# Patient Record
Sex: FEMALE | Race: WHITE
[De-identification: names, ages, dates, MRNs, and addresses within clinical notes are randomized per-mention and may not be internally consistent; named-entity substitution may affect disease eponyms.]

---

## 2018-05-07 ENCOUNTER — APPOINTMENT (OUTPATIENT)
Dept: CARDIOLOGY | Facility: CLINIC | Age: 83
End: 2018-05-07
Payer: MEDICARE

## 2018-05-07 PROCEDURE — 93306 TTE W/DOPPLER COMPLETE: CPT

## 2018-05-07 PROCEDURE — 78452 HT MUSCLE IMAGE SPECT MULT: CPT

## 2018-05-07 PROCEDURE — A9500: CPT

## 2018-05-07 PROCEDURE — 93015 CV STRESS TEST SUPVJ I&R: CPT

## 2019-05-14 NOTE — ASU PATIENT PROFILE, ADULT - PSH
History of hernia repair H/O total hip arthroplasty, bilateral    H/O total hysterectomy    History of bilateral knee arthroplasty    History of hernia repair

## 2019-05-15 ENCOUNTER — RESULT REVIEW (OUTPATIENT)
Age: 84
End: 2019-05-15

## 2019-05-15 ENCOUNTER — OUTPATIENT (OUTPATIENT)
Dept: OUTPATIENT SERVICES | Facility: HOSPITAL | Age: 84
LOS: 1 days | Discharge: HOME | End: 2019-05-15
Payer: MEDICARE

## 2019-05-15 VITALS
DIASTOLIC BLOOD PRESSURE: 68 MMHG | HEIGHT: 59 IN | WEIGHT: 171.3 LBS | SYSTOLIC BLOOD PRESSURE: 126 MMHG | OXYGEN SATURATION: 97 % | TEMPERATURE: 98 F | RESPIRATION RATE: 16 BRPM | HEART RATE: 90 BPM

## 2019-05-15 VITALS
HEART RATE: 76 BPM | OXYGEN SATURATION: 100 % | RESPIRATION RATE: 16 BRPM | SYSTOLIC BLOOD PRESSURE: 120 MMHG | DIASTOLIC BLOOD PRESSURE: 82 MMHG

## 2019-05-15 DIAGNOSIS — Z96.653 PRESENCE OF ARTIFICIAL KNEE JOINT, BILATERAL: Chronic | ICD-10-CM

## 2019-05-15 DIAGNOSIS — Z90.710 ACQUIRED ABSENCE OF BOTH CERVIX AND UTERUS: Chronic | ICD-10-CM

## 2019-05-15 DIAGNOSIS — Z96.643 PRESENCE OF ARTIFICIAL HIP JOINT, BILATERAL: Chronic | ICD-10-CM

## 2019-05-15 DIAGNOSIS — Z98.890 OTHER SPECIFIED POSTPROCEDURAL STATES: Chronic | ICD-10-CM

## 2019-05-15 LAB
ANION GAP SERPL CALC-SCNC: 7 MMOL/L — SIGNIFICANT CHANGE UP (ref 5–17)
BUN SERPL-MCNC: 30 MG/DL — HIGH (ref 7–23)
CALCIUM SERPL-MCNC: 8.7 MG/DL — SIGNIFICANT CHANGE UP (ref 8.5–10.1)
CHLORIDE SERPL-SCNC: 116 MMOL/L — HIGH (ref 96–108)
CO2 SERPL-SCNC: 20 MMOL/L — LOW (ref 22–31)
CREAT SERPL-MCNC: 1.61 MG/DL — HIGH (ref 0.5–1.3)
GLUCOSE SERPL-MCNC: 134 MG/DL — HIGH (ref 70–99)
INR BLD: 1.37 RATIO — HIGH (ref 0.88–1.16)
POTASSIUM SERPL-MCNC: 4.7 MMOL/L — SIGNIFICANT CHANGE UP (ref 3.5–5.3)
POTASSIUM SERPL-SCNC: 4.7 MMOL/L — SIGNIFICANT CHANGE UP (ref 3.5–5.3)
PROTHROM AB SERPL-ACNC: 15.4 SEC — HIGH (ref 10–12.9)
SODIUM SERPL-SCNC: 143 MMOL/L — SIGNIFICANT CHANGE UP (ref 135–145)

## 2019-05-15 PROCEDURE — 76942 ECHO GUIDE FOR BIOPSY: CPT | Mod: 26

## 2019-05-15 PROCEDURE — 88342 IMHCHEM/IMCYTCHM 1ST ANTB: CPT | Mod: 26

## 2019-05-15 PROCEDURE — 88341 IMHCHEM/IMCYTCHM EA ADD ANTB: CPT | Mod: 26

## 2019-05-15 PROCEDURE — 88173 CYTOPATH EVAL FNA REPORT: CPT | Mod: 26

## 2019-05-15 PROCEDURE — 93010 ELECTROCARDIOGRAM REPORT: CPT

## 2019-05-15 PROCEDURE — 88305 TISSUE EXAM BY PATHOLOGIST: CPT | Mod: 26

## 2019-05-15 PROCEDURE — 49180 BIOPSY ABDOMINAL MASS: CPT

## 2019-05-15 RX ORDER — SODIUM CHLORIDE 9 MG/ML
1000 INJECTION INTRAMUSCULAR; INTRAVENOUS; SUBCUTANEOUS
Refills: 0 | Status: DISCONTINUED | OUTPATIENT
Start: 2019-05-15 | End: 2019-05-15

## 2019-05-15 RX ORDER — OXYCODONE HYDROCHLORIDE 5 MG/1
5 TABLET ORAL ONCE
Refills: 0 | Status: DISCONTINUED | OUTPATIENT
Start: 2019-05-15 | End: 2019-05-15

## 2019-05-15 RX ORDER — ONDANSETRON 8 MG/1
4 TABLET, FILM COATED ORAL ONCE
Refills: 0 | Status: DISCONTINUED | OUTPATIENT
Start: 2019-05-15 | End: 2019-05-15

## 2019-05-15 RX ORDER — FENTANYL CITRATE 50 UG/ML
50 INJECTION INTRAVENOUS
Refills: 0 | Status: DISCONTINUED | OUTPATIENT
Start: 2019-05-15 | End: 2019-05-15

## 2019-05-15 NOTE — BRIEF OPERATIVE NOTE - OPERATION/FINDINGS
1) Peritoneal nodule seen on US  2) Access under US with 18G biopsy device  3) 18G core x 5  4) No post procedure hematoma

## 2019-05-22 LAB — SURGICAL PATHOLOGY STUDY: SIGNIFICANT CHANGE UP

## 2019-05-23 DIAGNOSIS — Z79.82 LONG TERM (CURRENT) USE OF ASPIRIN: ICD-10-CM

## 2019-05-23 DIAGNOSIS — Z88.0 ALLERGY STATUS TO PENICILLIN: ICD-10-CM

## 2019-05-23 DIAGNOSIS — C90.01 MULTIPLE MYELOMA IN REMISSION: ICD-10-CM

## 2019-05-23 DIAGNOSIS — K66.8 OTHER SPECIFIED DISORDERS OF PERITONEUM: ICD-10-CM

## 2019-05-23 DIAGNOSIS — I10 ESSENTIAL (PRIMARY) HYPERTENSION: ICD-10-CM

## 2019-05-23 DIAGNOSIS — G47.33 OBSTRUCTIVE SLEEP APNEA (ADULT) (PEDIATRIC): ICD-10-CM

## 2019-05-23 DIAGNOSIS — C48.1 MALIGNANT NEOPLASM OF SPECIFIED PARTS OF PERITONEUM: ICD-10-CM

## 2019-06-13 PROBLEM — C90.00 MULTIPLE MYELOMA NOT HAVING ACHIEVED REMISSION: Chronic | Status: ACTIVE | Noted: 2019-05-14

## 2019-06-13 PROBLEM — D64.9 ANEMIA, UNSPECIFIED: Chronic | Status: ACTIVE | Noted: 2019-05-14

## 2019-06-13 PROBLEM — N20.0 CALCULUS OF KIDNEY: Chronic | Status: ACTIVE | Noted: 2019-05-14

## 2019-06-14 ENCOUNTER — OUTPATIENT (OUTPATIENT)
Dept: OUTPATIENT SERVICES | Facility: HOSPITAL | Age: 84
LOS: 1 days | Discharge: HOME | End: 2019-06-14
Payer: MEDICARE

## 2019-06-14 DIAGNOSIS — Z98.890 OTHER SPECIFIED POSTPROCEDURAL STATES: Chronic | ICD-10-CM

## 2019-06-14 DIAGNOSIS — Z98.49 CATARACT EXTRACTION STATUS, UNSPECIFIED EYE: Chronic | ICD-10-CM

## 2019-06-14 DIAGNOSIS — R89.7 ABNORMAL HISTOLOGICAL FINDINGS IN SPECIMENS FROM OTHER ORGANS, SYSTEMS AND TISSUES: Chronic | ICD-10-CM

## 2019-06-14 DIAGNOSIS — Z90.89 ACQUIRED ABSENCE OF OTHER ORGANS: Chronic | ICD-10-CM

## 2019-06-14 DIAGNOSIS — Z76.89 PERSONS ENCOUNTERING HEALTH SERVICES IN OTHER SPECIFIED CIRCUMSTANCES: Chronic | ICD-10-CM

## 2019-06-14 DIAGNOSIS — C80.1 MALIGNANT (PRIMARY) NEOPLASM, UNSPECIFIED: ICD-10-CM

## 2019-06-14 DIAGNOSIS — Z96.643 PRESENCE OF ARTIFICIAL HIP JOINT, BILATERAL: Chronic | ICD-10-CM

## 2019-06-14 DIAGNOSIS — Z96.653 PRESENCE OF ARTIFICIAL KNEE JOINT, BILATERAL: Chronic | ICD-10-CM

## 2019-06-14 DIAGNOSIS — Z90.710 ACQUIRED ABSENCE OF BOTH CERVIX AND UTERUS: Chronic | ICD-10-CM

## 2019-06-14 LAB
ANION GAP SERPL CALC-SCNC: 10 MMOL/L — SIGNIFICANT CHANGE UP (ref 5–17)
APPEARANCE UR: CLEAR — SIGNIFICANT CHANGE UP
BASOPHILS # BLD AUTO: 0.02 K/UL — SIGNIFICANT CHANGE UP (ref 0–0.2)
BASOPHILS NFR BLD AUTO: 0.1 % — SIGNIFICANT CHANGE UP (ref 0–2)
BILIRUB UR-MCNC: NEGATIVE — SIGNIFICANT CHANGE UP
BUN SERPL-MCNC: 33 MG/DL — HIGH (ref 7–23)
CALCIUM SERPL-MCNC: 9.1 MG/DL — SIGNIFICANT CHANGE UP (ref 8.5–10.1)
CHLORIDE SERPL-SCNC: 117 MMOL/L — HIGH (ref 96–108)
CO2 SERPL-SCNC: 19 MMOL/L — LOW (ref 22–31)
COLOR SPEC: YELLOW — SIGNIFICANT CHANGE UP
CREAT SERPL-MCNC: 1.72 MG/DL — HIGH (ref 0.5–1.3)
DIFF PNL FLD: ABNORMAL
EOSINOPHIL # BLD AUTO: 0.06 K/UL — SIGNIFICANT CHANGE UP (ref 0–0.5)
EOSINOPHIL NFR BLD AUTO: 0.4 % — SIGNIFICANT CHANGE UP (ref 0–6)
GLUCOSE SERPL-MCNC: 116 MG/DL — HIGH (ref 70–99)
GLUCOSE UR QL: NEGATIVE MG/DL — SIGNIFICANT CHANGE UP
HCT VFR BLD CALC: 31.6 % — LOW (ref 34.5–45)
HGB BLD-MCNC: 9.6 G/DL — LOW (ref 11.5–15.5)
IMM GRANULOCYTES NFR BLD AUTO: 0.6 % — SIGNIFICANT CHANGE UP (ref 0–1.5)
KETONES UR-MCNC: ABNORMAL
LEUKOCYTE ESTERASE UR-ACNC: ABNORMAL
LYMPHOCYTES # BLD AUTO: 0.8 K/UL — LOW (ref 1–3.3)
LYMPHOCYTES # BLD AUTO: 5.1 % — LOW (ref 13–44)
MCHC RBC-ENTMCNC: 27.4 PG — SIGNIFICANT CHANGE UP (ref 27–34)
MCHC RBC-ENTMCNC: 30.4 GM/DL — LOW (ref 32–36)
MCV RBC AUTO: 90 FL — SIGNIFICANT CHANGE UP (ref 80–100)
MONOCYTES # BLD AUTO: 1.22 K/UL — HIGH (ref 0–0.9)
MONOCYTES NFR BLD AUTO: 7.8 % — SIGNIFICANT CHANGE UP (ref 2–14)
NEUTROPHILS # BLD AUTO: 13.45 K/UL — HIGH (ref 1.8–7.4)
NEUTROPHILS NFR BLD AUTO: 86 % — HIGH (ref 43–77)
NITRITE UR-MCNC: POSITIVE
PH UR: 6 — SIGNIFICANT CHANGE UP (ref 5–8)
PLATELET # BLD AUTO: 155 K/UL — SIGNIFICANT CHANGE UP (ref 150–400)
POTASSIUM SERPL-MCNC: 4.5 MMOL/L — SIGNIFICANT CHANGE UP (ref 3.5–5.3)
POTASSIUM SERPL-SCNC: 4.5 MMOL/L — SIGNIFICANT CHANGE UP (ref 3.5–5.3)
PROT UR-MCNC: 100 MG/DL
RBC # BLD: 3.51 M/UL — LOW (ref 3.8–5.2)
RBC # FLD: 16.9 % — HIGH (ref 10.3–14.5)
SODIUM SERPL-SCNC: 146 MMOL/L — HIGH (ref 135–145)
SP GR SPEC: 1.01 — SIGNIFICANT CHANGE UP (ref 1.01–1.02)
UROBILINOGEN FLD QL: NEGATIVE MG/DL — SIGNIFICANT CHANGE UP
WBC # BLD: 15.64 K/UL — HIGH (ref 3.8–10.5)
WBC # FLD AUTO: 15.64 K/UL — HIGH (ref 3.8–10.5)

## 2019-06-14 PROCEDURE — 93010 ELECTROCARDIOGRAM REPORT: CPT

## 2019-06-14 RX ORDER — LOPERAMIDE HCL 2 MG
0 TABLET ORAL
Qty: 0 | Refills: 0 | DISCHARGE

## 2019-06-14 RX ORDER — SODIUM CHLORIDE 9 MG/ML
1000 INJECTION, SOLUTION INTRAVENOUS
Refills: 0 | Status: DISCONTINUED | OUTPATIENT
Start: 2019-06-17 | End: 2019-06-17

## 2019-06-14 RX ORDER — ASPIRIN/CALCIUM CARB/MAGNESIUM 324 MG
1 TABLET ORAL
Qty: 0 | Refills: 0 | DISCHARGE

## 2019-06-14 RX ORDER — OXYCODONE HYDROCHLORIDE 5 MG/1
5 TABLET ORAL ONCE
Refills: 0 | Status: DISCONTINUED | OUTPATIENT
Start: 2019-06-17 | End: 2019-06-17

## 2019-06-14 RX ORDER — ACETAMINOPHEN 500 MG
2 TABLET ORAL
Qty: 0 | Refills: 0 | DISCHARGE

## 2019-06-14 RX ORDER — FENTANYL CITRATE 50 UG/ML
25 INJECTION INTRAVENOUS
Refills: 0 | Status: DISCONTINUED | OUTPATIENT
Start: 2019-06-17 | End: 2019-06-17

## 2019-06-14 RX ORDER — ONDANSETRON 8 MG/1
4 TABLET, FILM COATED ORAL ONCE
Refills: 0 | Status: DISCONTINUED | OUTPATIENT
Start: 2019-06-17 | End: 2019-06-17

## 2019-06-14 NOTE — ASU PATIENT PROFILE, ADULT - PSH
Abnormal biopsy result  abdomen May 2019  Encounter for cholecystectomy    H/O total hip arthroplasty, bilateral    H/O total hysterectomy    History of bilateral knee arthroplasty    History of hernia repair    S/P cataract surgery  b/l  S/P colonoscopy    S/P tonsillectomy

## 2019-06-14 NOTE — ASU PATIENT PROFILE, ADULT - PMH
Abdominal pain    Anemia    Anxiety    Diarrhea    GERD (gastroesophageal reflux disease)    Hyperlipidemia    Hypertension    Myeloma    Nephrolithiasis    Obesity    Osteoporosis    Urinary incontinence

## 2019-06-15 LAB
MRSA PCR RESULT.: SIGNIFICANT CHANGE UP
S AUREUS DNA NOSE QL NAA+PROBE: SIGNIFICANT CHANGE UP

## 2019-06-17 ENCOUNTER — OUTPATIENT (OUTPATIENT)
Dept: OUTPATIENT SERVICES | Facility: HOSPITAL | Age: 84
LOS: 1 days | Discharge: HOME | End: 2019-06-17
Payer: MEDICARE

## 2019-06-17 VITALS
WEIGHT: 156.09 LBS | TEMPERATURE: 98 F | DIASTOLIC BLOOD PRESSURE: 54 MMHG | OXYGEN SATURATION: 98 % | HEART RATE: 84 BPM | RESPIRATION RATE: 16 BRPM | SYSTOLIC BLOOD PRESSURE: 100 MMHG | HEIGHT: 57.5 IN

## 2019-06-17 VITALS
OXYGEN SATURATION: 97 % | DIASTOLIC BLOOD PRESSURE: 60 MMHG | RESPIRATION RATE: 14 BRPM | HEART RATE: 77 BPM | TEMPERATURE: 98 F | SYSTOLIC BLOOD PRESSURE: 124 MMHG

## 2019-06-17 DIAGNOSIS — Z98.890 OTHER SPECIFIED POSTPROCEDURAL STATES: Chronic | ICD-10-CM

## 2019-06-17 DIAGNOSIS — Z96.643 PRESENCE OF ARTIFICIAL HIP JOINT, BILATERAL: Chronic | ICD-10-CM

## 2019-06-17 DIAGNOSIS — Z90.710 ACQUIRED ABSENCE OF BOTH CERVIX AND UTERUS: Chronic | ICD-10-CM

## 2019-06-17 DIAGNOSIS — Z90.89 ACQUIRED ABSENCE OF OTHER ORGANS: Chronic | ICD-10-CM

## 2019-06-17 DIAGNOSIS — Z96.653 PRESENCE OF ARTIFICIAL KNEE JOINT, BILATERAL: Chronic | ICD-10-CM

## 2019-06-17 DIAGNOSIS — Z98.49 CATARACT EXTRACTION STATUS, UNSPECIFIED EYE: Chronic | ICD-10-CM

## 2019-06-17 DIAGNOSIS — R89.7 ABNORMAL HISTOLOGICAL FINDINGS IN SPECIMENS FROM OTHER ORGANS, SYSTEMS AND TISSUES: Chronic | ICD-10-CM

## 2019-06-17 DIAGNOSIS — Z76.89 PERSONS ENCOUNTERING HEALTH SERVICES IN OTHER SPECIFIED CIRCUMSTANCES: Chronic | ICD-10-CM

## 2019-06-17 PROCEDURE — 71045 X-RAY EXAM CHEST 1 VIEW: CPT | Mod: 26

## 2019-06-17 RX ORDER — ACETAMINOPHEN 500 MG
650 TABLET ORAL EVERY 6 HOURS
Refills: 0 | Status: DISCONTINUED | OUTPATIENT
Start: 2019-06-17 | End: 2019-07-02

## 2019-06-17 RX ORDER — SODIUM CHLORIDE 9 MG/ML
1000 INJECTION, SOLUTION INTRAVENOUS
Refills: 0 | Status: DISCONTINUED | OUTPATIENT
Start: 2019-06-17 | End: 2019-07-02

## 2019-06-17 RX ORDER — OXYCODONE HYDROCHLORIDE 5 MG/1
5 TABLET ORAL EVERY 6 HOURS
Refills: 0 | Status: DISCONTINUED | OUTPATIENT
Start: 2019-06-17 | End: 2019-06-17

## 2019-06-17 NOTE — ASU PATIENT PROFILE, ADULT - PMH
Abdominal pain    Anemia    Anxiety    Chronic kidney disease    Diarrhea    GERD (gastroesophageal reflux disease)    Hyperlipidemia    Hypertension    Myeloma    Nephrolithiasis    Obesity    Osteoporosis    Urinary incontinence

## 2019-06-17 NOTE — BRIEF OPERATIVE NOTE - NSICDXBRIEFPROCEDURE_GEN_ALL_CORE_FT
PROCEDURES:  Insertion, port, with imaging guidance, age 5 years or older 17-Jun-2019 09:09:03  Nestor Guillen

## 2019-06-21 DIAGNOSIS — N28.9 DISORDER OF KIDNEY AND URETER, UNSPECIFIED: ICD-10-CM

## 2019-06-21 DIAGNOSIS — F32.9 MAJOR DEPRESSIVE DISORDER, SINGLE EPISODE, UNSPECIFIED: ICD-10-CM

## 2019-06-21 DIAGNOSIS — Z88.0 ALLERGY STATUS TO PENICILLIN: ICD-10-CM

## 2019-06-21 DIAGNOSIS — C48.1 MALIGNANT NEOPLASM OF SPECIFIED PARTS OF PERITONEUM: ICD-10-CM

## 2019-06-21 DIAGNOSIS — C90.00 MULTIPLE MYELOMA NOT HAVING ACHIEVED REMISSION: ICD-10-CM

## 2019-06-21 DIAGNOSIS — I48.0 PAROXYSMAL ATRIAL FIBRILLATION: ICD-10-CM

## 2019-06-21 DIAGNOSIS — Z88.1 ALLERGY STATUS TO OTHER ANTIBIOTIC AGENTS STATUS: ICD-10-CM

## 2019-06-21 DIAGNOSIS — I10 ESSENTIAL (PRIMARY) HYPERTENSION: ICD-10-CM

## 2019-06-21 DIAGNOSIS — Z79.891 LONG TERM (CURRENT) USE OF OPIATE ANALGESIC: ICD-10-CM

## 2019-07-01 ENCOUNTER — EMERGENCY (EMERGENCY)
Facility: HOSPITAL | Age: 84
LOS: 0 days | Discharge: HOME | End: 2019-07-01
Attending: EMERGENCY MEDICINE
Payer: COMMERCIAL

## 2019-07-01 VITALS
OXYGEN SATURATION: 99 % | HEART RATE: 62 BPM | RESPIRATION RATE: 18 BRPM | DIASTOLIC BLOOD PRESSURE: 41 MMHG | SYSTOLIC BLOOD PRESSURE: 111 MMHG

## 2019-07-01 VITALS
OXYGEN SATURATION: 98 % | DIASTOLIC BLOOD PRESSURE: 53 MMHG | HEIGHT: 65 IN | WEIGHT: 115.08 LBS | HEART RATE: 67 BPM | TEMPERATURE: 98 F | SYSTOLIC BLOOD PRESSURE: 111 MMHG | RESPIRATION RATE: 17 BRPM

## 2019-07-01 DIAGNOSIS — Z87.442 PERSONAL HISTORY OF URINARY CALCULI: ICD-10-CM

## 2019-07-01 DIAGNOSIS — Z76.89 PERSONS ENCOUNTERING HEALTH SERVICES IN OTHER SPECIFIED CIRCUMSTANCES: Chronic | ICD-10-CM
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
## 2019-07-01 DIAGNOSIS — Z88.0 ALLERGY STATUS TO PENICILLIN: ICD-10-CM

## 2019-07-01 DIAGNOSIS — I25.10 ATHEROSCLEROTIC HEART DISEASE OF NATIVE CORONARY ARTERY WITHOUT ANGINA PECTORIS: ICD-10-CM

## 2019-07-01 DIAGNOSIS — S00.03XA CONTUSION OF SCALP, INITIAL ENCOUNTER: ICD-10-CM

## 2019-07-01 DIAGNOSIS — S09.90XA UNSPECIFIED INJURY OF HEAD, INITIAL ENCOUNTER: ICD-10-CM
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
## 2019-07-01 DIAGNOSIS — K21.9 GASTRO-ESOPHAGEAL REFLUX DISEASE WITHOUT ESOPHAGITIS: ICD-10-CM

## 2019-07-01 DIAGNOSIS — Z96.643 PRESENCE OF ARTIFICIAL HIP JOINT, BILATERAL: Chronic | ICD-10-CM

## 2019-07-01 DIAGNOSIS — I12.9 HYPERTENSIVE CHRONIC KIDNEY DISEASE WITH STAGE 1 THROUGH STAGE 4 CHRONIC KIDNEY DISEASE, OR UNSPECIFIED CHRONIC KIDNEY DISEASE: ICD-10-CM
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
## 2019-07-01 DIAGNOSIS — N18.9 CHRONIC KIDNEY DISEASE, UNSPECIFIED: ICD-10-CM
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
## 2019-07-01 DIAGNOSIS — M81.0 AGE-RELATED OSTEOPOROSIS WITHOUT CURRENT PATHOLOGICAL FRACTURE: ICD-10-CM

## 2019-07-01 DIAGNOSIS — R89.7 ABNORMAL HISTOLOGICAL FINDINGS IN SPECIMENS FROM OTHER ORGANS, SYSTEMS AND TISSUES: Chronic | ICD-10-CM

## 2019-07-01 DIAGNOSIS — Z98.890 OTHER SPECIFIED POSTPROCEDURAL STATES: Chronic | ICD-10-CM

## 2019-07-01 DIAGNOSIS — Z98.49 CATARACT EXTRACTION STATUS, UNSPECIFIED EYE: Chronic | ICD-10-CM

## 2019-07-01 DIAGNOSIS — I49.9 CARDIAC ARRHYTHMIA, UNSPECIFIED: ICD-10-CM

## 2019-07-01 DIAGNOSIS — Z90.89 ACQUIRED ABSENCE OF OTHER ORGANS: Chronic | ICD-10-CM

## 2019-07-01 DIAGNOSIS — F41.9 ANXIETY DISORDER, UNSPECIFIED: ICD-10-CM

## 2019-07-01 DIAGNOSIS — Z79.82 LONG TERM (CURRENT) USE OF ASPIRIN: ICD-10-CM

## 2019-07-01 DIAGNOSIS — Z96.653 PRESENCE OF ARTIFICIAL KNEE JOINT, BILATERAL: Chronic | ICD-10-CM

## 2019-07-01 DIAGNOSIS — Z90.710 ACQUIRED ABSENCE OF BOTH CERVIX AND UTERUS: Chronic | ICD-10-CM

## 2019-07-01 DIAGNOSIS — E78.5 HYPERLIPIDEMIA, UNSPECIFIED: ICD-10-CM

## 2019-07-01 DIAGNOSIS — D64.9 ANEMIA, UNSPECIFIED: ICD-10-CM

## 2019-07-01 PROBLEM — R10.9 UNSPECIFIED ABDOMINAL PAIN: Chronic | Status: ACTIVE | Noted: 2019-06-14

## 2019-07-01 PROBLEM — R19.7 DIARRHEA, UNSPECIFIED: Chronic | Status: ACTIVE | Noted: 2019-06-14

## 2019-07-01 PROBLEM — I10 ESSENTIAL (PRIMARY) HYPERTENSION: Chronic | Status: ACTIVE | Noted: 2019-06-14

## 2019-07-01 PROBLEM — E66.9 OBESITY, UNSPECIFIED: Chronic | Status: ACTIVE | Noted: 2019-06-14

## 2019-07-01 PROBLEM — R32 UNSPECIFIED URINARY INCONTINENCE: Chronic | Status: ACTIVE | Noted: 2019-06-14

## 2019-07-01 PROCEDURE — 72170 X-RAY EXAM OF PELVIS: CPT | Mod: 26

## 2019-07-01 PROCEDURE — 72125 CT NECK SPINE W/O DYE: CPT | Mod: 26

## 2019-07-01 PROCEDURE — 70450 CT HEAD/BRAIN W/O DYE: CPT | Mod: 26

## 2019-07-01 PROCEDURE — 99284 EMERGENCY DEPT VISIT MOD MDM: CPT

## 2019-07-01 NOTE — ED ADULT NURSE NOTE - NSIMPLEMENTINTERV_GEN_ALL_ED
Implemented All Fall Risk Interventions:  [place] to call system. Call bell, personal items and telephone within reach. Instruct patient to call for assistance. Room bathroom lighting operational. Non-slip footwear when patient is off stretcher. Physically safe environment: no spills, clutter or unnecessary equipment. Stretcher in lowest position, wheels locked, appropriate side rails in place. Provide visual cue, wrist band, yellow gown, etc. Monitor gait and stability. Monitor for mental status changes and reorient to person, place, and time. Review medications for side effects contributing to fall risk. Reinforce activity limits and safety measures with patient and family.

## 2019-07-01 NOTE — ED PROVIDER NOTE - MUSCULOSKELETAL, MLM
neck non tender supple without pain. back: pelvis non tender stable GANDHI x4 no focal swelling tenderness. n/l SLR 40 degrees no motor decrease pain. pt states chronic due to prior shoulder injury no focal tenderness.

## 2019-07-01 NOTE — ED PROVIDER NOTE - CHPI ED SYMPTOMS NEG
no back pain, no HA, no neck pain, no nausea, no blurry vision, no lethargy, no obvious changes in speech/no loss of consciousness/no bleeding

## 2019-07-01 NOTE — ED PROVIDER NOTE - NSFOLLOWUPINSTRUCTIONS_ED_ALL_ED_FT
Continue your regular Continue your regular medications as per routine.  Tylenol 325 mg 2 tabs every 6 hours as needed for headache, aches & pains.  Follow up this week with your doctor at your facility.  Avoid walking backwards: you are at increased of falling when attempting to walk backwards.  Otherwise, use walker for ambulation assistance.    ED evaluation and management discussed with the patient and family (if available) in detail.  Close PMD follow up encouraged.  Strict ED return instructions discussed in detail and patient given the opportunity to ask any questions about their discharge diagnosis and instructions. Patient verbalized understanding.        Closed Head Injury    A closed head injury is an injury to your head that may or may not involve a traumatic brain injury (TBI). Symptoms of TBI can be short or long lasting and include headache, dizziness, interference with memory or speech, fatigue, confusion, changes in sleep, mood changes, nausea, depression/anxiety, and dulling of senses. Make sure to obtain proper rest which includes getting plenty of sleep, avoiding excessive visual stimulation, and avoiding activities that may cause physical or mental stress. Avoid any situation where there is potential for another head injury, including sports.    SEEK IMMEDIATE MEDICAL CARE IF YOU HAVE ANY OF THE FOLLOWING SYMPTOMS: unusual drowsiness, vomiting, severe dizziness, seizures, lightheadedness, muscular weakness, different pupil sizes, visual changes, or clear or bloody discharge from your ears or nose.        Fall Prevention in the Home, Adult  Falls can cause injuries. They can happen to people of all ages. There are many things you can do to make your home safe and to help prevent falls. Ask for help when making these changes, if needed.    What actions can I take to prevent falls?  General Instructions     Use good lighting in all rooms. Replace any light bulbs that burn out.  Turn on the lights when you go into a dark area. Use night-lights.  Keep items that you use often in easy-to-reach places. Lower the shelves around your home if necessary.  Set up your furniture so you have a clear path. Avoid moving your furniture around.  Do not have throw rugs and other things on the floor that can make you trip.  Avoid walking on wet floors.  If any of your floors are uneven, fix them.  Add color or contrast paint or tape to clearly jada and help you see:  Any grab bars or handrails.  First and last steps of stairways.  Where the edge of each step is.  If you use a stepladder:  Make sure that it is fully opened. Do not climb a closed stepladder.  Make sure that both sides of the stepladder are locked into place.  Ask someone to hold the stepladder for you while you use it.  If there are any pets around you, be aware of where they are.  What can I do in the bathroom?     Image Image   Keep the floor dry. Clean up any water that spills onto the floor as soon as it happens.  Remove soap buildup in the tub or shower regularly.  Use non-skid mats or decals on the floor of the tub or shower.  Attach bath mats securely with double-sided, non-slip rug tape.  If you need to sit down in the shower, use a plastic, non-slip stool.  Install grab bars by the toilet and in the tub and shower. Do not use towel bars as grab bars.  What can I do in the bedroom?     Make sure that you have a light by your bed that is easy to reach.  Do not use any sheets or blankets that are too big for your bed. They should not hang down onto the floor.  Have a firm chair that has side arms. You can use this for support while you get dressed.  What can I do in the kitchen?     Clean up any spills right away.  If you need to reach something above you, use a strong step stool that has a grab bar.  Keep electrical cords out of the way.  Do not use floor polish or wax that makes floors slippery. If you must use wax, use non-skid floor wax.  What can I do with my stairs?     Do not leave any items on the stairs.  Make sure that you have a light switch at the top of the stairs and the bottom of the stairs. If you do not have them, ask someone to add them for you.  Make sure that there are handrails on both sides of the stairs, and use them. Fix handrails that are broken or loose. Make sure that handrails are as long as the stairways.  Install non-slip stair treads on all stairs in your home.  Avoid having throw rugs at the top or bottom of the stairs. If you do have throw rugs, attach them to the floor with carpet tape.  Choose a carpet that does not hide the edge of the steps on the stairway.  Check any carpeting to make sure that it is firmly attached to the stairs. Fix any carpet that is loose or worn.  What can I do on the outside of my home?     Use bright outdoor lighting.  Regularly fix the edges of walkways and driveways and fix any cracks.  Remove anything that might make you trip as you walk through a door, such as a raised step or threshold.  Trim any bushes or trees on the path to your home.  Regularly check to see if handrails are loose or broken. Make sure that both sides of any steps have handrails.  Install guardrails along the edges of any raised decks and porches.  Clear walking paths of anything that might make someone trip, such as tools or rocks.  Have any leaves, snow, or ice cleared regularly.  Use sand or salt on walking paths during winter.  Clean up any spills in your garage right away. This includes grease or oil spills.  What other actions can I take?     Wear shoes that:  Have a low heel. Do not wear high heels.  Have rubber bottoms.  Are comfortable and fit you well.  Are closed at the toe. Do not wear open-toe sandals.  Use tools that help you move around (mobility aids) if they are needed. These include:  Canes.  Walkers.  Scooters.  Crutches.  Review your medicines with your doctor. Some medicines can make you feel dizzy. This can increase your chance of falling.  Ask your doctor what other things you can do to help prevent falls.    Where to find more information  Centers for Disease Control and PreventionALLEGRA: https://cdc.gov  National [place] on Aging: https://oi3yfwk.ervin.nih.gov  Contact a doctor if:  You are afraid of falling at home.  You feel weak, drowsy, or dizzy at home.  You fall at home.  Summary  There are many simple things that you can do to make your home safe and to help prevent falls.  Ways to make your home safe include removing tripping hazards and installing grab bars in the bathroom.  Ask for help when making these changes in your home.  This information is not intended to replace advice given to you by your health care provider. Make sure you discuss any questions you have with your health care provider.

## 2019-07-01 NOTE — ED ADULT TRIAGE NOTE - CHIEF COMPLAINT QUOTE
Patient presents with EMS reports unwitnessed fall at assisted living. Patient was found on the ground, unsure what time fall occurred. Patient has hematoma to back of head. Patient on 81mg ASA daily trauma alert called at triage

## 2019-07-01 NOTE — ED ADULT NURSE REASSESSMENT NOTE - NS ED NURSE REASSESS COMMENT FT1
Pt able to ambulate with assistance and Dr Colunga made aware and pt given sandwich. will continue to monitor

## 2019-07-01 NOTE — ED PROVIDER NOTE - OBJECTIVE STATEMENT
84 y/o female with a PMHx of Anxiety, Arrythmia, Anemia, CAD, CKD, GERD, Obesity, HLD, HTN, Peripheral edema BIBA from [facility] from [place] s/p unwitnessed mechanical trip and fall with head injury around 0630 this morning. +bruise back of the head. States she was going to the bathroom, was ambulating backwards to open the door. states she lost her balance and fell back and landed on head. Needed help to get up from fall. No obvious bleeding. No LOC. Denies back pain, head pain, neck pain, nausea, blurry vision, lethargy, or obvious changes in speech. No known urine/BS changes. On daily full ASA. Baseline: ambulatory with a walker. Allergic to Penicillin. PCP: Dr. Chris Harding

## 2019-07-01 NOTE — ED PROVIDER NOTE - PROGRESS NOTE DETAILS
Ariana Colunga: during initial ED exam pt able to sit up without discomfort. Dr. Colunga:  CTs negative, XR negative by my review.  Pt w/o active c/o.   Informed by ED RN that pt passed ambulation trial.  Pt stable for D/C back to facility.

## 2019-07-01 NOTE — ED PROVIDER NOTE - ENMT, MLM
Minimal occipital scalp hematoma, overlying skin intact. Airway patent, Mouth with normal mucosa. Patel's negative. no facial injury.

## 2019-07-01 NOTE — ED PROVIDER NOTE - CLINICAL SUMMARY MEDICAL DECISION MAKING FREE TEXT BOX
82 y/o female with a PMHX of CAD on ASA, BIBA s/p lost balance while walking backwards and opening a door, fell back hitting occipital. No LOC, HA, no active complaints. neurovasc intact. Plan CT head, X ray pelvis study of CT negative.

## 2019-07-01 NOTE — ED PROVIDER NOTE - NEUROLOGICAL, MLM
Alert and oriented, no focal deficits, no motor or sensory deficits. speech normal, cranial nerves intact

## 2019-07-01 NOTE — ED PROVIDER NOTE - CARE PLAN
Principal Discharge DX:	Injury of head, initial encounter  Secondary Diagnosis:	Scalp hematoma, initial encounter  Secondary Diagnosis:	Fall from standing, initial encounter

## 2019-07-06 ENCOUNTER — OUTPATIENT (OUTPATIENT)
Dept: OUTPATIENT SERVICES | Facility: HOSPITAL | Age: 84
LOS: 1 days | Discharge: HOME | End: 2019-07-06

## 2019-07-06 DIAGNOSIS — Z76.89 PERSONS ENCOUNTERING HEALTH SERVICES IN OTHER SPECIFIED CIRCUMSTANCES: Chronic | ICD-10-CM

## 2019-07-06 DIAGNOSIS — R89.7 ABNORMAL HISTOLOGICAL FINDINGS IN SPECIMENS FROM OTHER ORGANS, SYSTEMS AND TISSUES: Chronic | ICD-10-CM

## 2019-07-06 DIAGNOSIS — Z98.890 OTHER SPECIFIED POSTPROCEDURAL STATES: Chronic | ICD-10-CM

## 2019-07-06 DIAGNOSIS — Z90.710 ACQUIRED ABSENCE OF BOTH CERVIX AND UTERUS: Chronic | ICD-10-CM

## 2019-07-06 DIAGNOSIS — Z96.653 PRESENCE OF ARTIFICIAL KNEE JOINT, BILATERAL: Chronic | ICD-10-CM

## 2019-07-06 DIAGNOSIS — Z98.49 CATARACT EXTRACTION STATUS, UNSPECIFIED EYE: Chronic | ICD-10-CM

## 2019-07-06 DIAGNOSIS — Z96.643 PRESENCE OF ARTIFICIAL HIP JOINT, BILATERAL: Chronic | ICD-10-CM

## 2019-07-06 DIAGNOSIS — Z90.89 ACQUIRED ABSENCE OF OTHER ORGANS: Chronic | ICD-10-CM

## 2019-07-06 LAB
ABO RH CONFIRMATION: SIGNIFICANT CHANGE UP
BLD GP AB SCN SERPL QL: SIGNIFICANT CHANGE UP
TYPE + AB SCN PNL BLD: SIGNIFICANT CHANGE UP

## 2019-07-08 ENCOUNTER — OUTPATIENT (OUTPATIENT)
Dept: OUTPATIENT SERVICES | Facility: HOSPITAL | Age: 84
LOS: 1 days | Discharge: HOME | End: 2019-07-08

## 2019-07-08 VITALS
HEART RATE: 88 BPM | OXYGEN SATURATION: 98 % | RESPIRATION RATE: 16 BRPM | SYSTOLIC BLOOD PRESSURE: 85 MMHG | DIASTOLIC BLOOD PRESSURE: 40 MMHG | TEMPERATURE: 98 F

## 2019-07-08 VITALS
RESPIRATION RATE: 16 BRPM | SYSTOLIC BLOOD PRESSURE: 113 MMHG | TEMPERATURE: 98 F | DIASTOLIC BLOOD PRESSURE: 48 MMHG | HEART RATE: 59 BPM

## 2019-07-08 DIAGNOSIS — Z96.653 PRESENCE OF ARTIFICIAL KNEE JOINT, BILATERAL: Chronic | ICD-10-CM

## 2019-07-08 DIAGNOSIS — Z76.89 PERSONS ENCOUNTERING HEALTH SERVICES IN OTHER SPECIFIED CIRCUMSTANCES: Chronic | ICD-10-CM

## 2019-07-08 DIAGNOSIS — Z98.49 CATARACT EXTRACTION STATUS, UNSPECIFIED EYE: Chronic | ICD-10-CM

## 2019-07-08 DIAGNOSIS — Z90.89 ACQUIRED ABSENCE OF OTHER ORGANS: Chronic | ICD-10-CM

## 2019-07-08 DIAGNOSIS — Z98.890 OTHER SPECIFIED POSTPROCEDURAL STATES: Chronic | ICD-10-CM

## 2019-07-08 DIAGNOSIS — Z96.643 PRESENCE OF ARTIFICIAL HIP JOINT, BILATERAL: Chronic | ICD-10-CM

## 2019-07-08 DIAGNOSIS — R89.7 ABNORMAL HISTOLOGICAL FINDINGS IN SPECIMENS FROM OTHER ORGANS, SYSTEMS AND TISSUES: Chronic | ICD-10-CM

## 2019-07-08 DIAGNOSIS — Z90.710 ACQUIRED ABSENCE OF BOTH CERVIX AND UTERUS: Chronic | ICD-10-CM

## 2019-07-08 RX ORDER — DIPHENHYDRAMINE HCL 50 MG
25 CAPSULE ORAL ONCE
Refills: 0 | Status: COMPLETED | OUTPATIENT
Start: 2019-07-08 | End: 2019-07-08

## 2019-07-08 RX ORDER — DIPHENHYDRAMINE HCL 50 MG
25 CAPSULE ORAL ONCE
Refills: 0 | Status: DISCONTINUED | OUTPATIENT
Start: 2019-07-08 | End: 2019-08-12

## 2019-07-08 RX ORDER — ACETAMINOPHEN 500 MG
650 TABLET ORAL ONCE
Refills: 0 | Status: DISCONTINUED | OUTPATIENT
Start: 2019-07-08 | End: 2019-08-12

## 2019-07-08 RX ORDER — ACETAMINOPHEN 500 MG
650 TABLET ORAL ONCE
Refills: 0 | Status: COMPLETED | OUTPATIENT
Start: 2019-07-08 | End: 2019-07-08

## 2019-07-08 RX ADMIN — Medication 650 MILLIGRAM(S): at 09:23

## 2019-07-08 RX ADMIN — Medication 25 MILLIGRAM(S): at 09:23

## 2019-07-08 NOTE — INFUSION NURSING HISTORY - RN HISTORY HPI
bx --> dx march 2019  chemo once a week x 3 sessions so far bx --> dx march 2019  chemo once a week (carboplatin + taxol) x 3 sessions so far

## 2019-07-11 DIAGNOSIS — C56.9 MALIGNANT NEOPLASM OF UNSPECIFIED OVARY: ICD-10-CM

## 2019-07-25 DIAGNOSIS — C56.9 MALIGNANT NEOPLASM OF UNSPECIFIED OVARY: ICD-10-CM

## 2019-08-18 ENCOUNTER — EMERGENCY (EMERGENCY)
Facility: HOSPITAL | Age: 84
LOS: 0 days | Discharge: HOME | End: 2019-08-18
Attending: EMERGENCY MEDICINE
Payer: MEDICARE

## 2019-08-18 VITALS
RESPIRATION RATE: 21 BRPM | TEMPERATURE: 98 F | SYSTOLIC BLOOD PRESSURE: 91 MMHG | OXYGEN SATURATION: 98 % | WEIGHT: 130.07 LBS | HEART RATE: 100 BPM | DIASTOLIC BLOOD PRESSURE: 62 MMHG

## 2019-08-18 VITALS
SYSTOLIC BLOOD PRESSURE: 145 MMHG | TEMPERATURE: 98 F | DIASTOLIC BLOOD PRESSURE: 70 MMHG | HEART RATE: 63 BPM | RESPIRATION RATE: 20 BRPM | OXYGEN SATURATION: 98 %

## 2019-08-18 DIAGNOSIS — Z98.890 OTHER SPECIFIED POSTPROCEDURAL STATES: Chronic | ICD-10-CM

## 2019-08-18 DIAGNOSIS — E78.5 HYPERLIPIDEMIA, UNSPECIFIED: ICD-10-CM

## 2019-08-18 DIAGNOSIS — E66.9 OBESITY, UNSPECIFIED: ICD-10-CM

## 2019-08-18 DIAGNOSIS — Z90.89 ACQUIRED ABSENCE OF OTHER ORGANS: Chronic | ICD-10-CM

## 2019-08-18 DIAGNOSIS — K59.00 CONSTIPATION, UNSPECIFIED: ICD-10-CM

## 2019-08-18 DIAGNOSIS — Z76.89 PERSONS ENCOUNTERING HEALTH SERVICES IN OTHER SPECIFIED CIRCUMSTANCES: Chronic | ICD-10-CM

## 2019-08-18 DIAGNOSIS — Z98.49 CATARACT EXTRACTION STATUS, UNSPECIFIED EYE: Chronic | ICD-10-CM

## 2019-08-18 DIAGNOSIS — Z96.653 PRESENCE OF ARTIFICIAL KNEE JOINT, BILATERAL: Chronic | ICD-10-CM

## 2019-08-18 DIAGNOSIS — Z90.710 ACQUIRED ABSENCE OF BOTH CERVIX AND UTERUS: Chronic | ICD-10-CM

## 2019-08-18 DIAGNOSIS — N18.9 CHRONIC KIDNEY DISEASE, UNSPECIFIED: ICD-10-CM

## 2019-08-18 DIAGNOSIS — I10 ESSENTIAL (PRIMARY) HYPERTENSION: ICD-10-CM

## 2019-08-18 DIAGNOSIS — Z90.710 ACQUIRED ABSENCE OF BOTH CERVIX AND UTERUS: ICD-10-CM

## 2019-08-18 DIAGNOSIS — Z88.0 ALLERGY STATUS TO PENICILLIN: ICD-10-CM

## 2019-08-18 DIAGNOSIS — K21.9 GASTRO-ESOPHAGEAL REFLUX DISEASE WITHOUT ESOPHAGITIS: ICD-10-CM

## 2019-08-18 DIAGNOSIS — Z96.643 PRESENCE OF ARTIFICIAL HIP JOINT, BILATERAL: Chronic | ICD-10-CM

## 2019-08-18 DIAGNOSIS — N39.0 URINARY TRACT INFECTION, SITE NOT SPECIFIED: ICD-10-CM

## 2019-08-18 DIAGNOSIS — K62.89 OTHER SPECIFIED DISEASES OF ANUS AND RECTUM: ICD-10-CM

## 2019-08-18 DIAGNOSIS — R89.7 ABNORMAL HISTOLOGICAL FINDINGS IN SPECIMENS FROM OTHER ORGANS, SYSTEMS AND TISSUES: Chronic | ICD-10-CM

## 2019-08-18 DIAGNOSIS — I12.9 HYPERTENSIVE CHRONIC KIDNEY DISEASE WITH STAGE 1 THROUGH STAGE 4 CHRONIC KIDNEY DISEASE, OR UNSPECIFIED CHRONIC KIDNEY DISEASE: ICD-10-CM

## 2019-08-18 LAB
ALBUMIN SERPL ELPH-MCNC: 3 G/DL — LOW (ref 3.3–5)
ALP SERPL-CCNC: 92 U/L — SIGNIFICANT CHANGE UP (ref 40–120)
ALT FLD-CCNC: 22 U/L — SIGNIFICANT CHANGE UP (ref 12–78)
ANION GAP SERPL CALC-SCNC: 7 MMOL/L — SIGNIFICANT CHANGE UP (ref 5–17)
APPEARANCE UR: ABNORMAL
APTT BLD: 24.7 SEC — LOW (ref 27.5–36.3)
AST SERPL-CCNC: 41 U/L — HIGH (ref 15–37)
BASOPHILS # BLD AUTO: 0.01 K/UL — SIGNIFICANT CHANGE UP (ref 0–0.2)
BASOPHILS NFR BLD AUTO: 0.1 % — SIGNIFICANT CHANGE UP (ref 0–2)
BILIRUB SERPL-MCNC: 0.8 MG/DL — SIGNIFICANT CHANGE UP (ref 0.2–1.2)
BILIRUB UR-MCNC: NEGATIVE — SIGNIFICANT CHANGE UP
BUN SERPL-MCNC: 44 MG/DL — HIGH (ref 7–23)
CALCIUM SERPL-MCNC: 8.6 MG/DL — SIGNIFICANT CHANGE UP (ref 8.5–10.1)
CHLORIDE SERPL-SCNC: 113 MMOL/L — HIGH (ref 96–108)
CO2 SERPL-SCNC: 21 MMOL/L — LOW (ref 22–31)
COLOR SPEC: YELLOW — SIGNIFICANT CHANGE UP
CREAT SERPL-MCNC: 1.58 MG/DL — HIGH (ref 0.5–1.3)
DIFF PNL FLD: ABNORMAL
EOSINOPHIL # BLD AUTO: 0 K/UL — SIGNIFICANT CHANGE UP (ref 0–0.5)
EOSINOPHIL NFR BLD AUTO: 0 % — SIGNIFICANT CHANGE UP (ref 0–6)
GLUCOSE SERPL-MCNC: 113 MG/DL — HIGH (ref 70–99)
GLUCOSE UR QL: NEGATIVE MG/DL — SIGNIFICANT CHANGE UP
HCT VFR BLD CALC: 35.4 % — SIGNIFICANT CHANGE UP (ref 34.5–45)
HGB BLD-MCNC: 11.1 G/DL — LOW (ref 11.5–15.5)
IMM GRANULOCYTES NFR BLD AUTO: 0.5 % — SIGNIFICANT CHANGE UP (ref 0–1.5)
INR BLD: 0.95 RATIO — SIGNIFICANT CHANGE UP (ref 0.88–1.16)
KETONES UR-MCNC: NEGATIVE — SIGNIFICANT CHANGE UP
LEUKOCYTE ESTERASE UR-ACNC: ABNORMAL
LIDOCAIN IGE QN: 204 U/L — SIGNIFICANT CHANGE UP (ref 73–393)
LYMPHOCYTES # BLD AUTO: 0.87 K/UL — LOW (ref 1–3.3)
LYMPHOCYTES # BLD AUTO: 8.9 % — LOW (ref 13–44)
MCHC RBC-ENTMCNC: 31.4 GM/DL — LOW (ref 32–36)
MCHC RBC-ENTMCNC: 31.4 PG — SIGNIFICANT CHANGE UP (ref 27–34)
MCV RBC AUTO: 100 FL — SIGNIFICANT CHANGE UP (ref 80–100)
MONOCYTES # BLD AUTO: 0.53 K/UL — SIGNIFICANT CHANGE UP (ref 0–0.9)
MONOCYTES NFR BLD AUTO: 5.4 % — SIGNIFICANT CHANGE UP (ref 2–14)
NEUTROPHILS # BLD AUTO: 8.28 K/UL — HIGH (ref 1.8–7.4)
NEUTROPHILS NFR BLD AUTO: 85.1 % — HIGH (ref 43–77)
NITRITE UR-MCNC: POSITIVE
PH UR: 6.5 — SIGNIFICANT CHANGE UP (ref 5–8)
PLATELET # BLD AUTO: 124 K/UL — LOW (ref 150–400)
POTASSIUM SERPL-MCNC: 5.6 MMOL/L — HIGH (ref 3.5–5.3)
POTASSIUM SERPL-SCNC: 5.6 MMOL/L — HIGH (ref 3.5–5.3)
PROT SERPL-MCNC: 6 GM/DL — SIGNIFICANT CHANGE UP (ref 6–8.3)
PROT UR-MCNC: 30 MG/DL
PROTHROM AB SERPL-ACNC: 10.5 SEC — SIGNIFICANT CHANGE UP (ref 10–12.9)
RBC # BLD: 3.54 M/UL — LOW (ref 3.8–5.2)
RBC # FLD: 21.9 % — HIGH (ref 10.3–14.5)
SODIUM SERPL-SCNC: 141 MMOL/L — SIGNIFICANT CHANGE UP (ref 135–145)
SP GR SPEC: 1.01 — SIGNIFICANT CHANGE UP (ref 1.01–1.02)
UROBILINOGEN FLD QL: NEGATIVE MG/DL — SIGNIFICANT CHANGE UP
WBC # BLD: 9.74 K/UL — SIGNIFICANT CHANGE UP (ref 3.8–10.5)
WBC # FLD AUTO: 9.74 K/UL — SIGNIFICANT CHANGE UP (ref 3.8–10.5)

## 2019-08-18 PROCEDURE — 96375 TX/PRO/DX INJ NEW DRUG ADDON: CPT

## 2019-08-18 PROCEDURE — 99285 EMERGENCY DEPT VISIT HI MDM: CPT

## 2019-08-18 PROCEDURE — 81001 URINALYSIS AUTO W/SCOPE: CPT

## 2019-08-18 PROCEDURE — 74176 CT ABD & PELVIS W/O CONTRAST: CPT | Mod: 26

## 2019-08-18 PROCEDURE — 93005 ELECTROCARDIOGRAM TRACING: CPT

## 2019-08-18 PROCEDURE — 99284 EMERGENCY DEPT VISIT MOD MDM: CPT | Mod: 25

## 2019-08-18 PROCEDURE — 96374 THER/PROPH/DIAG INJ IV PUSH: CPT

## 2019-08-18 PROCEDURE — 83690 ASSAY OF LIPASE: CPT

## 2019-08-18 PROCEDURE — 36415 COLL VENOUS BLD VENIPUNCTURE: CPT

## 2019-08-18 PROCEDURE — 80053 COMPREHEN METABOLIC PANEL: CPT

## 2019-08-18 PROCEDURE — 93010 ELECTROCARDIOGRAM REPORT: CPT

## 2019-08-18 PROCEDURE — 85730 THROMBOPLASTIN TIME PARTIAL: CPT

## 2019-08-18 PROCEDURE — 85610 PROTHROMBIN TIME: CPT

## 2019-08-18 PROCEDURE — 85025 COMPLETE CBC W/AUTO DIFF WBC: CPT

## 2019-08-18 PROCEDURE — 74176 CT ABD & PELVIS W/O CONTRAST: CPT

## 2019-08-18 RX ORDER — CEFTRIAXONE 500 MG/1
1000 INJECTION, POWDER, FOR SOLUTION INTRAMUSCULAR; INTRAVENOUS ONCE
Refills: 0 | Status: COMPLETED | OUTPATIENT
Start: 2019-08-18 | End: 2019-08-18

## 2019-08-18 RX ORDER — ONDANSETRON 8 MG/1
4 TABLET, FILM COATED ORAL ONCE
Refills: 0 | Status: COMPLETED | OUTPATIENT
Start: 2019-08-18 | End: 2019-08-18

## 2019-08-18 RX ORDER — CEPHALEXIN 500 MG
1 CAPSULE ORAL
Qty: 14 | Refills: 0
Start: 2019-08-18 | End: 2019-08-24

## 2019-08-18 RX ORDER — SODIUM CHLORIDE 9 MG/ML
1000 INJECTION INTRAMUSCULAR; INTRAVENOUS; SUBCUTANEOUS ONCE
Refills: 0 | Status: COMPLETED | OUTPATIENT
Start: 2019-08-18 | End: 2019-08-18

## 2019-08-18 RX ORDER — MORPHINE SULFATE 50 MG/1
4 CAPSULE, EXTENDED RELEASE ORAL ONCE
Refills: 0 | Status: DISCONTINUED | OUTPATIENT
Start: 2019-08-18 | End: 2019-08-18

## 2019-08-18 RX ORDER — MINERAL OIL
133 OIL (ML) MISCELLANEOUS ONCE
Refills: 0 | Status: COMPLETED | OUTPATIENT
Start: 2019-08-18 | End: 2019-08-18

## 2019-08-18 RX ADMIN — MORPHINE SULFATE 4 MILLIGRAM(S): 50 CAPSULE, EXTENDED RELEASE ORAL at 17:44

## 2019-08-18 RX ADMIN — ONDANSETRON 4 MILLIGRAM(S): 8 TABLET, FILM COATED ORAL at 18:49

## 2019-08-18 RX ADMIN — Medication 133 MILLILITER(S): at 22:07

## 2019-08-18 RX ADMIN — MORPHINE SULFATE 4 MILLIGRAM(S): 50 CAPSULE, EXTENDED RELEASE ORAL at 18:49

## 2019-08-18 RX ADMIN — SODIUM CHLORIDE 1000 MILLILITER(S): 9 INJECTION INTRAMUSCULAR; INTRAVENOUS; SUBCUTANEOUS at 17:44

## 2019-08-18 RX ADMIN — CEFTRIAXONE 1000 MILLIGRAM(S): 500 INJECTION, POWDER, FOR SOLUTION INTRAMUSCULAR; INTRAVENOUS at 23:09

## 2019-08-18 RX ADMIN — SODIUM CHLORIDE 1000 MILLILITER(S): 9 INJECTION INTRAMUSCULAR; INTRAVENOUS; SUBCUTANEOUS at 18:14

## 2019-08-18 NOTE — ED ADULT NURSE NOTE - OBJECTIVE STATEMENT
pt BIBEMS from the [facility]. as per EMS pt had fecal compaction, passed large amount of stool and then became in discomfort. pt denies N/V/D. pt denies sob,chest pain, fever, chills. pt denies blood in stool, upon arrival black stool noted on rectum.

## 2019-08-18 NOTE — ED ADULT NURSE NOTE - NSIMPLEMENTINTERV_GEN_ALL_ED
Implemented All Fall Risk Interventions:  [place] to call system. Call bell, personal items and telephone within reach. Instruct patient to call for assistance. Room bathroom lighting operational. Non-slip footwear when patient is off stretcher. Physically safe environment: no spills, clutter or unnecessary equipment. Stretcher in lowest position, wheels locked, appropriate side rails in place. Provide visual cue, wrist band, yellow gown, etc. Monitor gait and stability. Monitor for mental status changes and reorient to person, place, and time. Review medications for side effects contributing to fall risk. Reinforce activity limits and safety measures with patient and family.

## 2019-08-18 NOTE — ED PROVIDER NOTE - PROGRESS NOTE DETAILS
Ariana Bowden for ED attending Dr. Jacobo: Stool guaiac performed by Dr. Jacobo. Lot #: [id number]; Result: Guaiac positive; QC- reactive; Expiration Date: 12/19 Pt noted to have rectal fissure from BM on exam. Pt had streaks of blood on stool. Disimpaction performed bedside with minimal stool removed. Enema ordered.

## 2019-08-18 NOTE — ED ADULT TRIAGE NOTE - CHIEF COMPLAINT QUOTE
pt presents to ED due to rectal pain pt was impacted and after having a BM she had blood at the rectum now having pain

## 2019-08-18 NOTE — ED PROVIDER NOTE - NS_ ATTENDINGSCRIBEDETAILS _ED_A_ED_FT
I, Kike Jacobo MD,  performed the initial face to face bedside interview with this patient regarding history of present illness, review of symptoms and relevant past medical, social and family history.  I completed an independent physical examination.    The history, relevant review of systems, past medical and surgical history, medical decision making, and physical examination was documented by the scribe in my presence and I attest to the accuracy of the documentation.

## 2019-08-18 NOTE — ED PROVIDER NOTE - NSFOLLOWUPINSTRUCTIONS_ED_ALL_ED_FT
Constipation    WHAT YOU NEED TO KNOW:    Constipation is when you have hard, dry bowel movements, or you go longer than usual between bowel movements.     DISCHARGE INSTRUCTIONS:    Return to the emergency department if:     You have blood in your bowel movements.      You have a fever and abdominal pain with the constipation.    Contact your healthcare provider if:     Your constipation gets worse.       You start to vomit.      You have questions or concerns about your condition or care.    Medicines:     Medicine such as a laxative may help relax and loosen your intestines to help you have a bowel movement. Your provider may recommend you only use laxatives for a short time. Long-term use may make your bowels dependent on the medicine.      Take your medicine as directed. Contact your healthcare provider if you think your medicine is not helping or if you have side effects. Tell him of her if you are allergic to any medicine. Keep a list of the medicines, vitamins, and herbs you take. Include the amounts, and when and why you take them. Bring the list or the pill bottles to follow-up visits. Carry your medicine list with you in case of an emergency.    Relieve constipation:     A suppository may be used to help soften your bowel movements. This may make them easier to pass. A suppository is guided into your rectum through your anus.Suppository for Constipation           An enema is liquid medicine used to clear bowel movement from your rectum. The medicine is put into your rectum through your anus.Enemas         Prevent constipation:     Drink liquids as directed. You may need to drink extra liquids to help soften and move your bowels. Ask how much liquid to drink each day and which liquids are best for you.       Eat high-fiber foods. This may help decrease constipation by adding bulk to your bowel movements. High-fiber foods include fruit, vegetables, whole-grain breads and cereals, and beans. Your healthcare provider or dietitian can help you create a high-fiber meal plan. Your provider may also recommend a fiber supplement if you cannot get enough fiber from food.            Exercise regularly. Regular physical activity can help stimulate your intestines. Ask which exercises are best for you.      Schedule a time each day to have a bowel movement. This may help train your body to have regular bowel movements. Bend forward while you are on the toilet to help move the bowel movement out. Sit on the toilet for at least 10 minutes, even if you do not have a bowel movement.     Follow up with your healthcare provider as directed: Write down your questions so you remember to ask them during your visits.  Urinary Tract Infection, Adult  ImageA urinary tract infection (UTI) is an infection of any part of the urinary tract, which includes the kidneys, ureters, bladder, and urethra. These organs make, store, and get rid of urine in the body. UTI can be a bladder infection (cystitis) or kidney infection (pyelonephritis).    What are the causes?  This infection may be caused by fungi, viruses, or bacteria. Bacteria are the most common cause of UTIs. This condition can also be caused by repeated incomplete emptying of the bladder during urination.    What increases the risk?  This condition is more likely to develop if:    You ignore your need to urinate or hold urine for long periods of time.  You do not empty your bladder completely during urination.  You wipe back to front after urinating or having a bowel movement, if you are female.  You are uncircumcised, if you are male.  You are constipated.  You have a urinary catheter that stays in place (indwelling).  You have a weak defense (immune) system.  You have a medical condition that affects your bowels, kidneys, or bladder.  You have diabetes.  You take antibiotic medicines frequently or for long periods of time, and the antibiotics no longer work well against certain types of infections (antibiotic resistance).  You take medicines that irritate your urinary tract.  You are exposed to chemicals that irritate your urinary tract.  You are female.    What are the signs or symptoms?  Symptoms of this condition include:    Fever.  Frequent urination or passing small amounts of urine frequently.  Needing to urinate urgently.  Pain or burning with urination.  Urine that smells bad or unusual.  Cloudy urine.  Pain in the lower abdomen or back.  Trouble urinating.  Blood in the urine.  Vomiting or being less hungry than normal.  Diarrhea or abdominal pain.  Vaginal discharge, if you are female.    How is this diagnosed?  This condition is diagnosed with a medical history and physical exam. You will also need to provide a urine sample to test your urine. Other tests may be done, including:    Blood tests.  Sexually transmitted disease (STD) testing.    If you have had more than one UTI, a cystoscopy or imaging studies may be done to determine the cause of the infections.    How is this treated?  Treatment for this condition often includes a combination of two or more of the following:    Antibiotic medicine.  Other medicines to treat less common causes of UTI.  Over-the-counter medicines to treat pain.  Drinking enough water to stay hydrated.    Follow these instructions at home:  Take over-the-counter and prescription medicines only as told by your health care provider.  If you were prescribed an antibiotic, take it as told by your health care provider. Do not stop taking the antibiotic even if you start to feel better.  Avoid alcohol, caffeine, tea, and carbonated beverages. They can irritate your bladder.  Drink enough fluid to keep your urine clear or pale yellow.  Keep all follow-up visits as told by your health care provider. This is important.  ImageMake sure to:    Empty your bladder often and completely. Do not hold urine for long periods of time.  Empty your bladder before and after sex.  Wipe from front to back after a bowel movement if you are female. Use each tissue one time when you wipe.    Contact a health care provider if:  You have back pain.  You have a fever.  You feel nauseous or vomit.  Your symptoms do not get better after 3 days.  Your symptoms go away and then return.  Get help right away if:  You have severe back pain or lower abdominal pain.  You are vomiting and cannot keep down any medicines or water.  This information is not intended to replace advice given to you by your health care provider. Make sure you discuss any questions you have with your health care provider.

## 2019-08-18 NOTE — ED PROVIDER NOTE - OBJECTIVE STATEMENT
82 y/o female with a PMHx of Anxiety, Arrythmia, Anemia, CAD, CKD, GERD, Obesity, HLD, HTN, Peripheral edema, nephrolithiasis, myeloma presenting to the ED BIBA from [facility] c/o lower abd pain since this morning. Pt was impacted, had BM today with pain and rectal bleeding, reports severe abd pain since then. Denies n/v, fever, chills. Allergic to Penicillin. PCP: Dr. Chris Harding. 84 y/o female with a PMHx of Anxiety, Arrythmia, Anemia, CAD, CKD, GERD, Obesity, HLD, HTN, Peripheral edema, nephrolithiasis, myeloma presenting to the ED BIBA from [facility] c/o lower abd pain since this morning. Pt was impacted, had BM today with pain and rectal bleeding, reports abd pain since then. Denies n/v, fever, chills. Allergic to Penicillin. PCP: Dr. Chris Harding.

## 2019-12-09 ENCOUNTER — INPATIENT (INPATIENT)
Facility: HOSPITAL | Age: 84
LOS: 7 days | Discharge: HOSPICE | DRG: 871 | End: 2019-12-17
Attending: FAMILY MEDICINE | Admitting: FAMILY MEDICINE
Payer: MEDICARE

## 2019-12-09 VITALS
DIASTOLIC BLOOD PRESSURE: 46 MMHG | RESPIRATION RATE: 16 BRPM | WEIGHT: 130.07 LBS | SYSTOLIC BLOOD PRESSURE: 96 MMHG | TEMPERATURE: 98 F | OXYGEN SATURATION: 99 % | HEIGHT: 59 IN | HEART RATE: 94 BPM

## 2019-12-09 DIAGNOSIS — Z98.49 CATARACT EXTRACTION STATUS, UNSPECIFIED EYE: Chronic | ICD-10-CM

## 2019-12-09 DIAGNOSIS — Z98.890 OTHER SPECIFIED POSTPROCEDURAL STATES: Chronic | ICD-10-CM

## 2019-12-09 DIAGNOSIS — Z90.710 ACQUIRED ABSENCE OF BOTH CERVIX AND UTERUS: Chronic | ICD-10-CM

## 2019-12-09 DIAGNOSIS — Z96.653 PRESENCE OF ARTIFICIAL KNEE JOINT, BILATERAL: Chronic | ICD-10-CM

## 2019-12-09 DIAGNOSIS — A41.9 SEPSIS, UNSPECIFIED ORGANISM: ICD-10-CM

## 2019-12-09 DIAGNOSIS — R89.7 ABNORMAL HISTOLOGICAL FINDINGS IN SPECIMENS FROM OTHER ORGANS, SYSTEMS AND TISSUES: Chronic | ICD-10-CM

## 2019-12-09 DIAGNOSIS — Z90.89 ACQUIRED ABSENCE OF OTHER ORGANS: Chronic | ICD-10-CM

## 2019-12-09 DIAGNOSIS — Z96.643 PRESENCE OF ARTIFICIAL HIP JOINT, BILATERAL: Chronic | ICD-10-CM

## 2019-12-09 DIAGNOSIS — Z76.89 PERSONS ENCOUNTERING HEALTH SERVICES IN OTHER SPECIFIED CIRCUMSTANCES: Chronic | ICD-10-CM

## 2019-12-09 LAB
ALBUMIN SERPL ELPH-MCNC: 2.2 G/DL — LOW (ref 3.3–5)
ALP SERPL-CCNC: 101 U/L — SIGNIFICANT CHANGE UP (ref 40–120)
ALT FLD-CCNC: 17 U/L — SIGNIFICANT CHANGE UP (ref 12–78)
ANION GAP SERPL CALC-SCNC: 8 MMOL/L — SIGNIFICANT CHANGE UP (ref 5–17)
ANION GAP SERPL CALC-SCNC: 9 MMOL/L — SIGNIFICANT CHANGE UP (ref 5–17)
APPEARANCE UR: ABNORMAL
AST SERPL-CCNC: 15 U/L — SIGNIFICANT CHANGE UP (ref 15–37)
BASOPHILS # BLD AUTO: 0.02 K/UL — SIGNIFICANT CHANGE UP (ref 0–0.2)
BASOPHILS NFR BLD AUTO: 0.1 % — SIGNIFICANT CHANGE UP (ref 0–2)
BILIRUB SERPL-MCNC: 0.5 MG/DL — SIGNIFICANT CHANGE UP (ref 0.2–1.2)
BILIRUB UR-MCNC: NEGATIVE — SIGNIFICANT CHANGE UP
BUN SERPL-MCNC: 82 MG/DL — HIGH (ref 7–23)
BUN SERPL-MCNC: 94 MG/DL — HIGH (ref 7–23)
CALCIUM SERPL-MCNC: 7.2 MG/DL — LOW (ref 8.5–10.1)
CALCIUM SERPL-MCNC: 7.4 MG/DL — LOW (ref 8.5–10.1)
CHLORIDE SERPL-SCNC: 115 MMOL/L — HIGH (ref 96–108)
CHLORIDE SERPL-SCNC: 117 MMOL/L — HIGH (ref 96–108)
CO2 SERPL-SCNC: 17 MMOL/L — LOW (ref 22–31)
CO2 SERPL-SCNC: 17 MMOL/L — LOW (ref 22–31)
COLOR SPEC: YELLOW — SIGNIFICANT CHANGE UP
CREAT SERPL-MCNC: 2.77 MG/DL — HIGH (ref 0.5–1.3)
CREAT SERPL-MCNC: 3.17 MG/DL — HIGH (ref 0.5–1.3)
DIFF PNL FLD: ABNORMAL
EOSINOPHIL # BLD AUTO: 0 K/UL — SIGNIFICANT CHANGE UP (ref 0–0.5)
EOSINOPHIL NFR BLD AUTO: 0 % — SIGNIFICANT CHANGE UP (ref 0–6)
GLUCOSE SERPL-MCNC: 100 MG/DL — HIGH (ref 70–99)
GLUCOSE SERPL-MCNC: 104 MG/DL — HIGH (ref 70–99)
GLUCOSE UR QL: NEGATIVE MG/DL — SIGNIFICANT CHANGE UP
HCT VFR BLD CALC: 30.8 % — LOW (ref 34.5–45)
HGB BLD-MCNC: 8.9 G/DL — LOW (ref 11.5–15.5)
IMM GRANULOCYTES NFR BLD AUTO: 0.9 % — SIGNIFICANT CHANGE UP (ref 0–1.5)
KETONES UR-MCNC: ABNORMAL
LACTATE SERPL-SCNC: 0.8 MMOL/L — SIGNIFICANT CHANGE UP (ref 0.7–2)
LEUKOCYTE ESTERASE UR-ACNC: ABNORMAL
LYMPHOCYTES # BLD AUTO: 0.58 K/UL — LOW (ref 1–3.3)
LYMPHOCYTES # BLD AUTO: 3 % — LOW (ref 13–44)
MCHC RBC-ENTMCNC: 28.6 PG — SIGNIFICANT CHANGE UP (ref 27–34)
MCHC RBC-ENTMCNC: 28.9 GM/DL — LOW (ref 32–36)
MCV RBC AUTO: 99 FL — SIGNIFICANT CHANGE UP (ref 80–100)
MONOCYTES # BLD AUTO: 0.95 K/UL — HIGH (ref 0–0.9)
MONOCYTES NFR BLD AUTO: 5 % — SIGNIFICANT CHANGE UP (ref 2–14)
NEUTROPHILS # BLD AUTO: 17.42 K/UL — HIGH (ref 1.8–7.4)
NEUTROPHILS NFR BLD AUTO: 91 % — HIGH (ref 43–77)
NITRITE UR-MCNC: NEGATIVE — SIGNIFICANT CHANGE UP
PH UR: 5 — SIGNIFICANT CHANGE UP (ref 5–8)
PLATELET # BLD AUTO: 85 K/UL — LOW (ref 150–400)
POTASSIUM SERPL-MCNC: 4.7 MMOL/L — SIGNIFICANT CHANGE UP (ref 3.5–5.3)
POTASSIUM SERPL-MCNC: 5.5 MMOL/L — HIGH (ref 3.5–5.3)
POTASSIUM SERPL-SCNC: 4.7 MMOL/L — SIGNIFICANT CHANGE UP (ref 3.5–5.3)
POTASSIUM SERPL-SCNC: 5.5 MMOL/L — HIGH (ref 3.5–5.3)
PROT SERPL-MCNC: 5.3 GM/DL — LOW (ref 6–8.3)
PROT UR-MCNC: 30 MG/DL
RBC # BLD: 3.11 M/UL — LOW (ref 3.8–5.2)
RBC # FLD: 17.2 % — HIGH (ref 10.3–14.5)
SODIUM SERPL-SCNC: 141 MMOL/L — SIGNIFICANT CHANGE UP (ref 135–145)
SODIUM SERPL-SCNC: 142 MMOL/L — SIGNIFICANT CHANGE UP (ref 135–145)
SP GR SPEC: 1.01 — SIGNIFICANT CHANGE UP (ref 1.01–1.02)
UROBILINOGEN FLD QL: NEGATIVE MG/DL — SIGNIFICANT CHANGE UP
WBC # BLD: 19.15 K/UL — HIGH (ref 3.8–10.5)
WBC # FLD AUTO: 19.15 K/UL — HIGH (ref 3.8–10.5)

## 2019-12-09 PROCEDURE — 80048 BASIC METABOLIC PNL TOTAL CA: CPT

## 2019-12-09 PROCEDURE — 82945 GLUCOSE OTHER FLUID: CPT

## 2019-12-09 PROCEDURE — 76770 US EXAM ABDO BACK WALL COMP: CPT

## 2019-12-09 PROCEDURE — 88305 TISSUE EXAM BY PATHOLOGIST: CPT

## 2019-12-09 PROCEDURE — 71250 CT THORAX DX C-: CPT

## 2019-12-09 PROCEDURE — 85610 PROTHROMBIN TIME: CPT

## 2019-12-09 PROCEDURE — 85730 THROMBOPLASTIN TIME PARTIAL: CPT

## 2019-12-09 PROCEDURE — P9045: CPT

## 2019-12-09 PROCEDURE — 86304 IMMUNOASSAY TUMOR CA 125: CPT

## 2019-12-09 PROCEDURE — 87070 CULTURE OTHR SPECIMN AEROBIC: CPT

## 2019-12-09 PROCEDURE — 71045 X-RAY EXAM CHEST 1 VIEW: CPT | Mod: 26

## 2019-12-09 PROCEDURE — 87075 CULTR BACTERIA EXCEPT BLOOD: CPT

## 2019-12-09 PROCEDURE — 83986 ASSAY PH BODY FLUID NOS: CPT

## 2019-12-09 PROCEDURE — 82042 OTHER SOURCE ALBUMIN QUAN EA: CPT

## 2019-12-09 PROCEDURE — 87116 MYCOBACTERIA CULTURE: CPT

## 2019-12-09 PROCEDURE — 70450 CT HEAD/BRAIN W/O DYE: CPT | Mod: 26

## 2019-12-09 PROCEDURE — 84157 ASSAY OF PROTEIN OTHER: CPT

## 2019-12-09 PROCEDURE — 97116 GAIT TRAINING THERAPY: CPT | Mod: GP

## 2019-12-09 PROCEDURE — 87206 SMEAR FLUORESCENT/ACID STAI: CPT

## 2019-12-09 PROCEDURE — 87449 NOS EACH ORGANISM AG IA: CPT

## 2019-12-09 PROCEDURE — 99223 1ST HOSP IP/OBS HIGH 75: CPT

## 2019-12-09 PROCEDURE — 84443 ASSAY THYROID STIM HORMONE: CPT

## 2019-12-09 PROCEDURE — 93005 ELECTROCARDIOGRAM TRACING: CPT

## 2019-12-09 PROCEDURE — 85027 COMPLETE CBC AUTOMATED: CPT

## 2019-12-09 PROCEDURE — 83605 ASSAY OF LACTIC ACID: CPT

## 2019-12-09 PROCEDURE — 89051 BODY FLUID CELL COUNT: CPT

## 2019-12-09 PROCEDURE — 87015 SPECIMEN INFECT AGNT CONCNTJ: CPT

## 2019-12-09 PROCEDURE — 83615 LACTATE (LD) (LDH) ENZYME: CPT

## 2019-12-09 PROCEDURE — 71045 X-RAY EXAM CHEST 1 VIEW: CPT

## 2019-12-09 PROCEDURE — 93010 ELECTROCARDIOGRAM REPORT: CPT

## 2019-12-09 PROCEDURE — 97163 PT EVAL HIGH COMPLEX 45 MIN: CPT | Mod: GP

## 2019-12-09 PROCEDURE — 88108 CYTOPATH CONCENTRATE TECH: CPT

## 2019-12-09 PROCEDURE — 36415 COLL VENOUS BLD VENIPUNCTURE: CPT

## 2019-12-09 PROCEDURE — 93306 TTE W/DOPPLER COMPLETE: CPT

## 2019-12-09 PROCEDURE — 87102 FUNGUS ISOLATION CULTURE: CPT

## 2019-12-09 PROCEDURE — 36430 TRANSFUSION BLD/BLD COMPNT: CPT

## 2019-12-09 PROCEDURE — 80069 RENAL FUNCTION PANEL: CPT

## 2019-12-09 RX ORDER — MUPIROCIN 20 MG/G
1 OINTMENT TOPICAL
Qty: 0 | Refills: 0 | DISCHARGE

## 2019-12-09 RX ORDER — AZTREONAM 2 G
1000 VIAL (EA) INJECTION ONCE
Refills: 0 | Status: COMPLETED | OUTPATIENT
Start: 2019-12-09 | End: 2019-12-09

## 2019-12-09 RX ORDER — SODIUM CHLORIDE 9 MG/ML
1000 INJECTION INTRAMUSCULAR; INTRAVENOUS; SUBCUTANEOUS ONCE
Refills: 0 | Status: COMPLETED | OUTPATIENT
Start: 2019-12-09 | End: 2019-12-09

## 2019-12-09 RX ORDER — SERTRALINE 25 MG/1
150 TABLET, FILM COATED ORAL DAILY
Refills: 0 | Status: DISCONTINUED | OUTPATIENT
Start: 2019-12-09 | End: 2019-12-10

## 2019-12-09 RX ORDER — PANTOPRAZOLE SODIUM 20 MG/1
40 TABLET, DELAYED RELEASE ORAL
Refills: 0 | Status: DISCONTINUED | OUTPATIENT
Start: 2019-12-09 | End: 2019-12-17

## 2019-12-09 RX ORDER — OXYCODONE HYDROCHLORIDE 5 MG/1
5 TABLET ORAL
Refills: 0 | Status: DISCONTINUED | OUTPATIENT
Start: 2019-12-09 | End: 2019-12-16

## 2019-12-09 RX ORDER — METOPROLOL TARTRATE 50 MG
100 TABLET ORAL DAILY
Refills: 0 | Status: DISCONTINUED | OUTPATIENT
Start: 2019-12-09 | End: 2019-12-10

## 2019-12-09 RX ORDER — OXYCODONE HYDROCHLORIDE 5 MG/1
0 TABLET ORAL
Qty: 0 | Refills: 0 | DISCHARGE

## 2019-12-09 RX ORDER — LANOLIN ALCOHOL/MO/W.PET/CERES
3 CREAM (GRAM) TOPICAL AT BEDTIME
Refills: 0 | Status: DISCONTINUED | OUTPATIENT
Start: 2019-12-09 | End: 2019-12-17

## 2019-12-09 RX ORDER — CLONAZEPAM 1 MG
0.5 TABLET ORAL DAILY
Refills: 0 | Status: DISCONTINUED | OUTPATIENT
Start: 2019-12-10 | End: 2019-12-16

## 2019-12-09 RX ORDER — ZOLPIDEM TARTRATE 10 MG/1
5 TABLET ORAL AT BEDTIME
Refills: 0 | Status: DISCONTINUED | OUTPATIENT
Start: 2019-12-09 | End: 2019-12-16

## 2019-12-09 RX ORDER — AZTREONAM 2 G
500 VIAL (EA) INJECTION EVERY 12 HOURS
Refills: 0 | Status: DISCONTINUED | OUTPATIENT
Start: 2019-12-09 | End: 2019-12-10

## 2019-12-09 RX ORDER — CLONAZEPAM 1 MG
1 TABLET ORAL
Qty: 0 | Refills: 0 | DISCHARGE

## 2019-12-09 RX ORDER — ACETAMINOPHEN 500 MG
1 TABLET ORAL
Qty: 0 | Refills: 0 | DISCHARGE

## 2019-12-09 RX ORDER — LANOLIN ALCOHOL/MO/W.PET/CERES
2 CREAM (GRAM) TOPICAL
Qty: 0 | Refills: 0 | DISCHARGE

## 2019-12-09 RX ORDER — SERTRALINE 25 MG/1
2 TABLET, FILM COATED ORAL
Qty: 0 | Refills: 0 | DISCHARGE

## 2019-12-09 RX ORDER — SPIRONOLACTONE 25 MG/1
0 TABLET, FILM COATED ORAL
Qty: 0 | Refills: 0 | DISCHARGE

## 2019-12-09 RX ORDER — FOLIC ACID 0.8 MG
1 TABLET ORAL DAILY
Refills: 0 | Status: DISCONTINUED | OUTPATIENT
Start: 2019-12-09 | End: 2019-12-17

## 2019-12-09 RX ORDER — MIRTAZAPINE 45 MG/1
7.5 TABLET, ORALLY DISINTEGRATING ORAL AT BEDTIME
Refills: 0 | Status: DISCONTINUED | OUTPATIENT
Start: 2019-12-09 | End: 2019-12-17

## 2019-12-09 RX ORDER — ASPIRIN/CALCIUM CARB/MAGNESIUM 324 MG
1 TABLET ORAL
Qty: 0 | Refills: 0 | DISCHARGE

## 2019-12-09 RX ORDER — SPIRONOLACTONE 25 MG/1
25 TABLET, FILM COATED ORAL DAILY
Refills: 0 | Status: DISCONTINUED | OUTPATIENT
Start: 2019-12-09 | End: 2019-12-09

## 2019-12-09 RX ORDER — CLONAZEPAM 1 MG
1 TABLET ORAL
Refills: 0 | Status: DISCONTINUED | OUTPATIENT
Start: 2019-12-09 | End: 2019-12-16

## 2019-12-09 RX ORDER — ASPIRIN/CALCIUM CARB/MAGNESIUM 324 MG
325 TABLET ORAL DAILY
Refills: 0 | Status: DISCONTINUED | OUTPATIENT
Start: 2019-12-09 | End: 2019-12-13

## 2019-12-09 RX ORDER — ATORVASTATIN CALCIUM 80 MG/1
10 TABLET, FILM COATED ORAL AT BEDTIME
Refills: 0 | Status: DISCONTINUED | OUTPATIENT
Start: 2019-12-09 | End: 2019-12-17

## 2019-12-09 RX ORDER — HEPARIN SODIUM 5000 [USP'U]/ML
5000 INJECTION INTRAVENOUS; SUBCUTANEOUS EVERY 12 HOURS
Refills: 0 | Status: DISCONTINUED | OUTPATIENT
Start: 2019-12-09 | End: 2019-12-13

## 2019-12-09 RX ORDER — LOPERAMIDE HCL 2 MG
0 TABLET ORAL
Qty: 0 | Refills: 0 | DISCHARGE

## 2019-12-09 RX ORDER — CALCIUM CARBONATE 500(1250)
2 TABLET ORAL
Qty: 0 | Refills: 0 | DISCHARGE

## 2019-12-09 RX ORDER — ACETAMINOPHEN 500 MG
1000 TABLET ORAL EVERY 12 HOURS
Refills: 0 | Status: DISCONTINUED | OUTPATIENT
Start: 2019-12-09 | End: 2019-12-17

## 2019-12-09 RX ORDER — VANCOMYCIN HCL 1 G
1000 VIAL (EA) INTRAVENOUS ONCE
Refills: 0 | Status: COMPLETED | OUTPATIENT
Start: 2019-12-09 | End: 2019-12-09

## 2019-12-09 RX ORDER — SODIUM CHLORIDE 9 MG/ML
1000 INJECTION INTRAMUSCULAR; INTRAVENOUS; SUBCUTANEOUS
Refills: 0 | Status: DISCONTINUED | OUTPATIENT
Start: 2019-12-09 | End: 2019-12-10

## 2019-12-09 RX ORDER — FERROUS SULFATE 325(65) MG
325 TABLET ORAL
Refills: 0 | Status: DISCONTINUED | OUTPATIENT
Start: 2019-12-09 | End: 2019-12-17

## 2019-12-09 RX ORDER — METOPROLOL TARTRATE 50 MG
0 TABLET ORAL
Qty: 0 | Refills: 0 | DISCHARGE

## 2019-12-09 RX ADMIN — Medication 250 MILLIGRAM(S): at 15:17

## 2019-12-09 RX ADMIN — SODIUM CHLORIDE 125 MILLILITER(S): 9 INJECTION INTRAMUSCULAR; INTRAVENOUS; SUBCUTANEOUS at 21:09

## 2019-12-09 RX ADMIN — SODIUM CHLORIDE 1000 MILLILITER(S): 9 INJECTION INTRAMUSCULAR; INTRAVENOUS; SUBCUTANEOUS at 14:13

## 2019-12-09 RX ADMIN — Medication 3 MILLIGRAM(S): at 21:09

## 2019-12-09 RX ADMIN — ATORVASTATIN CALCIUM 10 MILLIGRAM(S): 80 TABLET, FILM COATED ORAL at 21:10

## 2019-12-09 RX ADMIN — SODIUM CHLORIDE 1000 MILLILITER(S): 9 INJECTION INTRAMUSCULAR; INTRAVENOUS; SUBCUTANEOUS at 13:07

## 2019-12-09 RX ADMIN — OXYCODONE HYDROCHLORIDE 5 MILLIGRAM(S): 5 TABLET ORAL at 21:09

## 2019-12-09 RX ADMIN — MIRTAZAPINE 7.5 MILLIGRAM(S): 45 TABLET, ORALLY DISINTEGRATING ORAL at 21:09

## 2019-12-09 RX ADMIN — Medication 50 MILLIGRAM(S): at 14:12

## 2019-12-09 NOTE — H&P ADULT - NSHPPHYSICALEXAM_GEN_ALL_CORE
ICU Vital Signs Last 24 Hrs  T(C): 36.8 (09 Dec 2019 12:53), Max: 36.9 (09 Dec 2019 10:52)  T(F): 98.3 (09 Dec 2019 12:53), Max: 98.5 (09 Dec 2019 10:52)  HR: 77 (09 Dec 2019 16:15) (62 - 94)  BP: 92/54 (09 Dec 2019 16:15) (90/59 - 96/49)    RR: 15 (09 Dec 2019 16:15) (15 - 18)  SpO2: 98% (09 Dec 2019 16:15) (97% - 100%)

## 2019-12-09 NOTE — H&P ADULT - NSHPLABSRESULTS_GEN_ALL_CORE
< from: CT Head No Cont (12.09.19 @ 12:12) >    IMPRESSION:   mild periventricular and bifrontal biparietal subcortical   white matter ischemia is noted    TERA JORDAN, ATTENDING RADIOLOGIST  This document has been electronically signed. Dec  9 2019 12:24PM    < end of copied text > < from: CT Head No Cont (12.09.19 @ 12:12) >    IMPRESSION:   mild periventricular and bifrontal biparietal subcortical   white matter ischemia is noted    TERA JORDAN, ATTENDING RADIOLOGIST  This document has been electronically signed. Dec  9 2019 12:24PM    < end of copied text >    < from: Xray Chest 1 View AP/PA. (12.09.19 @ 11:45) >    EXAM:  XR CHEST 1 VIEW                            PROCEDURE DATE:  12/09/2019          INTERPRETATION:  Short of breath.    AP chest. Prior 6/17/2019.    Chin obscures portion right apex. Low lung volumes. No change heart   mediastinum. Right chestport reidentified in situ. There is new focal   consolidation the right lower lobe likely representing pneumonia and   atelectasis in the appropriate clinical setting. New small right pleural   effusion.    Impression: Right lower lobe consolidation and small right parapneumonic   effusion    PARADISE OLSON   This document has been electronically signed. Dec  9 2019 12:16PM    < end of copied text >

## 2019-12-09 NOTE — H&P ADULT - NSICDXPASTSURGICALHX_GEN_ALL_CORE_FT
PAST SURGICAL HISTORY:  Abnormal biopsy result abdomen May 2019    Encounter for cholecystectomy     H/O total hip arthroplasty, bilateral     H/O total hysterectomy     History of bilateral knee arthroplasty     History of hernia repair     S/P cataract surgery b/l    S/P colonoscopy     S/P tonsillectomy

## 2019-12-09 NOTE — H&P ADULT - REASON FOR ADMISSION
sepsis Pneumonia with parapneumonic effusion  [facility]  [facility]  sepsis Pneumonia with parapneumonic effusion  [facility]  [facility]  [facility]  sepsis

## 2019-12-09 NOTE — H&P ADULT - NSICDXPASTMEDICALHX_GEN_ALL_CORE_FT
PAST MEDICAL HISTORY:  Abdominal pain     Anemia     Anxiety     Chronic kidney disease     Diarrhea     GERD (gastroesophageal reflux disease)     Hyperlipidemia     Hypertension     Myeloma     Nephrolithiasis     Obesity     Osteoporosis     Urinary incontinence

## 2019-12-09 NOTE — H&P ADULT - ASSESSMENT
Pt is admitted w/  Sepsis  Pneumonia with parapneumonic effusion  [facility]  [facility]  - s/p IVF 2 LIters NS  - cont IVF  - s/p Azactam and Vanco, cont Azactam  - follow blood cx, uc, sputum cx  - ID consult  - DVT prophylax: heparin  - IMPROVE VTE Individual Risk Assessment    RISK                                                                Points    [  ] Previous VTE                                                  3    [  ] Thrombophilia                                               2    [  ] Lower limb paralysis                                      2        (unable to hold up >15 seconds)      [  ] Current Cancer                                              2         (within 6 months)    [ X ] Immobilization > 24 hrs                                1    [  ] ICU/CCU stay > 24 hours                              1    [X  ] Age > 60                                                      1    IMPROVE VTE Score ___2-3______    IMPROVE Score 0-1: Low Risk, No VTE prophylaxis required for most patients, encourage ambulation.   IMPROVE Score 2-3: At risk, pharmacologic VTE prophylaxis is indicated for most patients (in the absence of a contraindication)  IMPROVE Score > or = 4: High Risk, pharmacologic VTE prophylaxis is indicated for most patients (in the absence of a contraindication) Pt is admitted w/  Sepsis  Pneumonia with parapneumonic effusion  [facility]  [facility]  - s/p IVF 2 LIters NS  - cont IVF  - s/p Azactam and Vanco, cont Azactam  - follow blood cx, uc, sputum cx, check lactate  - ID consult  - DVT prophylax: heparin  - IMPROVE VTE Individual Risk Assessment    RISK                                                                Points    [  ] Previous VTE                                                  3    [  ] Thrombophilia                                               2    [  ] Lower limb paralysis                                      2        (unable to hold up >15 seconds)      [  ] Current Cancer                                              2         (within 6 months)    [ X ] Immobilization > 24 hrs                                1    [  ] ICU/CCU stay > 24 hours                              1    [X  ] Age > 60                                                      1    IMPROVE VTE Score ___2-3______    IMPROVE Score 0-1: Low Risk, No VTE prophylaxis required for most patients, encourage ambulation.   IMPROVE Score 2-3: At risk, pharmacologic VTE prophylaxis is indicated for most patients (in the absence of a contraindication)  IMPROVE Score > or = 4: High Risk, pharmacologic VTE prophylaxis is indicated for most patients (in the absence of a contraindication) Pt is admitted w/  Sepsis  Pneumonia with parapneumonic effusion  [facility]I  [facility], Acute on CKD II  [facility]  - s/p IVF 2 LIters NS  - cont IVF  - s/p Azactam and Vanco, cont Azactam  - follow blood cx, uc, sputum cx, check lactate  - hold spironolactone  - ID consult  - Nephrology consult  - DVT prophylax: heparin  - IMPROVE VTE Individual Risk Assessment    RISK                                                                Points    [  ] Previous VTE                                                  3    [  ] Thrombophilia                                               2    [  ] Lower limb paralysis                                      2        (unable to hold up >15 seconds)      [  ] Current Cancer                                              2         (within 6 months)    [ X ] Immobilization > 24 hrs                                1    [  ] ICU/CCU stay > 24 hours                              1    [X  ] Age > 60                                                      1    IMPROVE VTE Score ___2-3______    IMPROVE Score 0-1: Low Risk, No VTE prophylaxis required for most patients, encourage ambulation.   IMPROVE Score 2-3: At risk, pharmacologic VTE prophylaxis is indicated for most patients (in the absence of a contraindication)  IMPROVE Score > or = 4: High Risk, pharmacologic VTE prophylaxis is indicated for most patients (in the absence of a contraindication)

## 2019-12-09 NOTE — ED PROVIDER NOTE - OBJECTIVE STATEMENT
83 y/o F with PMHx of HTN, HLD, arrythmia, CKD presenting to the ED BIBEMS form NH c/o SOB and abd pain. Per EMS, pt reported that she fell out of bed twice within the past 2 days, but did not fall today. Pt states she does not know why she is here. Pt takes 325mg ASA. Pt uses walker and wheelchair at baseline. History unobtainable.

## 2019-12-09 NOTE — H&P ADULT - EYES
[phone] detailed exam PERRL/conjunctiva clear

## 2019-12-09 NOTE — ED PROVIDER NOTE - CLINICAL SUMMARY MEDICAL DECISION MAKING FREE TEXT BOX
84 F p/w no complaints. States she wants to go back to NH. Sent here by NH as she fell twice in 2 days and they states she had trouble breathing. Pt breathing normally here. labs, UA, CXR, CT head, reeval.

## 2019-12-09 NOTE — ED PROVIDER NOTE - CONSTITUTIONAL, MLM
normal... Elderly F dehydrated  appearing, awake, alert, oriented to person, place, time/situation and in no apparent distress.

## 2019-12-09 NOTE — H&P ADULT - HISTORY OF PRESENT ILLNESS
Pt is an 85 y/o F with PMHx of HTN, HLD, arrythmia, CKD II presenting to the ED BIBEMS form NH c/o SOB and abd pain. Per EMS, pt reported that she fell out of bed twice within the past 2 days, but did not fall today. Pt states she does not know why she is here. Pt takes 325mg ASA. Pt uses walker and wheelchair at baseline. History unobtainable. Pt is an 83 y/o F with PMHx of HTN, HLD, arrythmia, CKD II presenting to the ED via EMS form [facility] c/o SOB and  weakness, decreased appetitite. Per EMS, pt reported that she fell out of bed twice within the past 2 days, but did not fall today.   Pt states she does not know why she is here, she is a poor historian.  She denies cp, URI or UTI symptoms.  She requests to return to the nursing facility.   Pt takes 325mg ASA.     Pt uses walker and wheelchair at baseline.     Fam HX:   unavailable

## 2019-12-09 NOTE — ED ADULT NURSE NOTE - NSIMPLEMENTINTERV_GEN_ALL_ED
Implemented All Fall Risk Interventions:  [place] to call system. Call bell, personal items and telephone within reach. Instruct patient to call for assistance. Room bathroom lighting operational. Non-slip footwear when patient is off stretcher. Physically safe environment: no spills, clutter or unnecessary equipment. Stretcher in lowest position, wheels locked, appropriate side rails in place. Provide visual cue, wrist band, yellow gown, etc. Monitor gait and stability. Monitor for mental status changes and reorient to person, place, and time. Review medications for side effects contributing to fall risk. Reinforce activity limits and safety measures with patient and family.

## 2019-12-09 NOTE — ED ADULT TRIAGE NOTE - CHIEF COMPLAINT QUOTE
Pt. to the ED from NH C/O SOB and Abdominal Pain. EMS and Pt. states she fell OOB x 2 times within the past 2 days- Pt. states she does not recall hitting head. GSC 15. Pt. denies HA at this time. Hx. of Anxiety, Arrythmia, HLD and HTN. Pt. on Full Aspirin. Pt. does not meet criteria for TA at this time.

## 2019-12-10 LAB
HCT VFR BLD CALC: 30.8 % — LOW (ref 34.5–45)
HGB BLD-MCNC: 9 G/DL — LOW (ref 11.5–15.5)
LEGIONELLA AG UR QL: NEGATIVE — SIGNIFICANT CHANGE UP
MCHC RBC-ENTMCNC: 29 PG — SIGNIFICANT CHANGE UP (ref 27–34)
MCHC RBC-ENTMCNC: 29.2 GM/DL — LOW (ref 32–36)
MCV RBC AUTO: 99.4 FL — SIGNIFICANT CHANGE UP (ref 80–100)
PLATELET # BLD AUTO: 75 K/UL — LOW (ref 150–400)
RBC # BLD: 3.1 M/UL — LOW (ref 3.8–5.2)
RBC # FLD: 17.2 % — HIGH (ref 10.3–14.5)
WBC # BLD: 17.4 K/UL — HIGH (ref 3.8–10.5)
WBC # FLD AUTO: 17.4 K/UL — HIGH (ref 3.8–10.5)

## 2019-12-10 RX ORDER — METOPROLOL TARTRATE 50 MG
50 TABLET ORAL DAILY
Refills: 0 | Status: DISCONTINUED | OUTPATIENT
Start: 2019-12-10 | End: 2019-12-12

## 2019-12-10 RX ORDER — CEFTRIAXONE 500 MG/1
1000 INJECTION, POWDER, FOR SOLUTION INTRAMUSCULAR; INTRAVENOUS EVERY 24 HOURS
Refills: 0 | Status: DISCONTINUED | OUTPATIENT
Start: 2019-12-10 | End: 2019-12-12

## 2019-12-10 RX ORDER — SERTRALINE 25 MG/1
100 TABLET, FILM COATED ORAL DAILY
Refills: 0 | Status: DISCONTINUED | OUTPATIENT
Start: 2019-12-10 | End: 2019-12-17

## 2019-12-10 RX ORDER — SODIUM CHLORIDE 9 MG/ML
1000 INJECTION INTRAMUSCULAR; INTRAVENOUS; SUBCUTANEOUS
Refills: 0 | Status: DISCONTINUED | OUTPATIENT
Start: 2019-12-10 | End: 2019-12-11

## 2019-12-10 RX ADMIN — Medication 1 MILLIGRAM(S): at 05:45

## 2019-12-10 RX ADMIN — PANTOPRAZOLE SODIUM 40 MILLIGRAM(S): 20 TABLET, DELAYED RELEASE ORAL at 05:46

## 2019-12-10 RX ADMIN — Medication 100 MILLIGRAM(S): at 17:57

## 2019-12-10 RX ADMIN — SODIUM CHLORIDE 125 MILLILITER(S): 9 INJECTION INTRAMUSCULAR; INTRAVENOUS; SUBCUTANEOUS at 05:47

## 2019-12-10 RX ADMIN — Medication 3 MILLIGRAM(S): at 21:59

## 2019-12-10 RX ADMIN — Medication 325 MILLIGRAM(S): at 17:57

## 2019-12-10 RX ADMIN — Medication 325 MILLIGRAM(S): at 13:11

## 2019-12-10 RX ADMIN — Medication 1 MILLIGRAM(S): at 13:11

## 2019-12-10 RX ADMIN — Medication 1 MILLIGRAM(S): at 17:57

## 2019-12-10 RX ADMIN — HEPARIN SODIUM 5000 UNIT(S): 5000 INJECTION INTRAVENOUS; SUBCUTANEOUS at 05:46

## 2019-12-10 RX ADMIN — Medication 325 MILLIGRAM(S): at 05:45

## 2019-12-10 RX ADMIN — Medication 50 MILLIGRAM(S): at 05:46

## 2019-12-10 RX ADMIN — SERTRALINE 100 MILLIGRAM(S): 25 TABLET, FILM COATED ORAL at 13:11

## 2019-12-10 RX ADMIN — CEFTRIAXONE 1000 MILLIGRAM(S): 500 INJECTION, POWDER, FOR SOLUTION INTRAMUSCULAR; INTRAVENOUS at 17:55

## 2019-12-10 RX ADMIN — MIRTAZAPINE 7.5 MILLIGRAM(S): 45 TABLET, ORALLY DISINTEGRATING ORAL at 21:59

## 2019-12-10 RX ADMIN — Medication 1000 MILLIGRAM(S): at 13:10

## 2019-12-10 RX ADMIN — ATORVASTATIN CALCIUM 10 MILLIGRAM(S): 80 TABLET, FILM COATED ORAL at 21:59

## 2019-12-10 RX ADMIN — HEPARIN SODIUM 5000 UNIT(S): 5000 INJECTION INTRAVENOUS; SUBCUTANEOUS at 17:57

## 2019-12-10 NOTE — CONSULT NOTE ADULT - SUBJECTIVE AND OBJECTIVE BOX
HPI:  Pt is an 83 y/o F with PMHx of HTN, HLD, arrythmia, CKD II admitted from assisted living on  for evaluation of shortness of breath and weakness; patient is unclear as to why she is in hospital; history per medical record as patient is poor historian. She gives history of penicillin allergy over 50 years ago and she does not recall her reaction.           PMH: as above  PSH: as above  Meds: per reconciliation sheet, noted below  MEDICATIONS  (STANDING):  aspirin enteric coated 325 milliGRAM(s) Oral daily  atorvastatin 10 milliGRAM(s) Oral at bedtime  cefTRIAXone Injectable. 1000 milliGRAM(s) IV Push every 24 hours  clonazePAM  Tablet 1 milliGRAM(s) Oral two times a day  clonazePAM  Tablet 0.5 milliGRAM(s) Oral daily  doxycycline hyclate Capsule 100 milliGRAM(s) Oral every 12 hours  ferrous    sulfate 325 milliGRAM(s) Oral two times a day  folic acid 1 milliGRAM(s) Oral daily  heparin  Injectable 5000 Unit(s) SubCutaneous every 12 hours  melatonin 3 milliGRAM(s) Oral at bedtime  metoprolol succinate ER 50 milliGRAM(s) Oral daily  mirtazapine 7.5 milliGRAM(s) Oral at bedtime  oxyCODONE    IR 5 milliGRAM(s) Oral two times a day  pantoprazole    Tablet 40 milliGRAM(s) Oral before breakfast  sertraline 100 milliGRAM(s) Oral daily  sodium chloride 0.9%. 1000 milliLiter(s) (125 mL/Hr) IV Continuous <Continuous>    MEDICATIONS  (PRN):  acetaminophen   Tablet .. 1000 milliGRAM(s) Oral every 12 hours PRN Moderate Pain (4 - 6)  zolpidem 5 milliGRAM(s) Oral at bedtime PRN Insomnia  zolpidem 5 milliGRAM(s) Oral at bedtime PRN Insomnia    Allergies    Erythromycin Base (Other)  penicillin (Rash)    Intolerances      Social: no smoking, no alcohol, no illegal drugs; no recent travel, no exposure to TB  FAMILY HISTORY:     no history of premature cardiovascular disease in first degree relatives  ROS: unable to obtain secondary to patient medical condition     Vital Signs Last 24 Hrs  T(C): 36.4 (10 Dec 2019 05:16), Max: 37.1 (09 Dec 2019 18:48)  T(F): 97.6 (10 Dec 2019 05:16), Max: 98.7 (09 Dec 2019 18:48)  HR: 77 (10 Dec 2019 05:16) (62 - 87)  BP: 82/47 (10 Dec 2019 05:16) (82/47 - 96/49)  BP(mean): 55 (10 Dec 2019 05:16) (55 - 55)  RR: 18 (10 Dec 2019 05:16) (15 - 18)  SpO2: 95% (10 Dec 2019 05:16) (95% - 100%)  Daily     Daily     PE:    Constitutional: frail looking  HEENT: NC/AT, EOMI, PERRLA, conjunctivae clear; ears and nose atraumatic; pharynx clear  Neck: supple; thyroid not palpable  Back: no tenderness  Respiratory: respiratory effort normal; scattered coarse breath sounds  Cardiovascular: S1S2 regular, no murmurs  Abdomen: soft, not tender, not distended, positive BS; no liver or spleen organomegaly  Genitourinary: no suprapubic tenderness  Musculoskeletal: no muscle tenderness, no joint swelling or tenderness  Neurological/ Psychiatric:   moving all extremities  Skin: no rashes; no palpable lesions    Labs: all available labs reviewed                        9.0    17.40 )-----------( 75       ( 10 Dec 2019 09:58 )             30.8     12-    142  |  117<H>  |  82<H>  ----------------------------<  100<H>  4.7   |  17<L>  |  2.77<H>    Ca    7.2<L>      09 Dec 2019 22:39    TPro  5.3<L>  /  Alb  2.2<L>  /  TBili  0.5  /  DBili  x   /  AST  15  /  ALT  17  /  AlkPhos  101  12-09     LIVER FUNCTIONS - ( 09 Dec 2019 12:27 )  Alb: 2.2 g/dL / Pro: 5.3 gm/dL / ALK PHOS: 101 U/L / ALT: 17 U/L / AST: 15 U/L / GGT: x           Urinalysis Basic - ( 09 Dec 2019 12:51 )    Color: Yellow / Appearance: Slightly Turbid / S.015 / pH: x  Gluc: x / Ketone: Trace  / Bili: Negative / Urobili: Negative mg/dL   Blood: x / Protein: 30 mg/dL / Nitrite: Negative   Leuk Esterase: Moderate / RBC: 6-10 /HPF / WBC >50   Sq Epi: x / Non Sq Epi: Few / Bacteria: Few        < from: Xray Chest 1 View AP/PA. (19 @ 11:45) >    EXAM:  XR CHEST 1 VIEW                            PROCEDURE DATE:  2019          INTERPRETATION:  Short of breath.    AP chest. Prior 2019.    Chin obscures portion right apex. Low lung volumes. No change heart   mediastinum. Right chestport reidentified in situ. There is new focal   consolidation the right lower lobe likely representing pneumonia and   atelectasis in the appropriate clinical setting. New small right pleural   effusion.    Impression: Right lower lobe consolidation and small right parapneumonic   effusion      < end of copied text >          Radiology: all available radiological tests reviewed    Advanced directives addressed: full resuscitation

## 2019-12-10 NOTE — PROGRESS NOTE ADULT - SUBJECTIVE AND OBJECTIVE BOX
85 y/o F with PMHx of HTN, HLD, arrythmia, CKD II presenting to the ED via EMS form [facility] c/o SOB and  weakness, decreased appetitite. Per EMS, pt reported that she fell out of bed twice within the past 2 days, but did not fall today.  Patient is a poor historian. Admitted for pneumonia and UTI- started on IV antibiotics.     12/10- patient was seen and examined. Tearful- stated that she feels scared. Denies chest pain, fever or chills.     Vital Signs Last 24 Hrs  T(C): 36.7 (10 Dec 2019 11:01), Max: 37.1 (09 Dec 2019 18:48)  T(F): 98.1 (10 Dec 2019 11:01), Max: 98.7 (09 Dec 2019 18:48)  HR: 91 (10 Dec 2019 11:01) (62 - 91)  BP: 93/55 (10 Dec 2019 11:01) (82/47 - 96/49)  BP(mean): 55 (10 Dec 2019 05:16) (55 - 55)  RR: 18 (10 Dec 2019 11:01) (15 - 18)  SpO2: 93% (10 Dec 2019 11:01) (93% - 99%)    REVIEW OF SYSTEMS:    CONSTITUTIONAL: No weakness, fevers or chills  EYES/ENT: No visual changes;  No vertigo or throat pain   NECK: No pain or stiffness  RESPIRATORY: No cough, wheezing, hemoptysis; No shortness of breath  CARDIOVASCULAR: No chest pain or palpitations  GASTROINTESTINAL: No abdominal or epigastric pain. No nausea, vomiting, or hematemesis; No diarrhea or constipation. No melena or hematochezia.  GENITOURINARY: No dysuria, frequency or hematuria  NEUROLOGICAL: No numbness or weakness  SKIN: No itching, burning, rashes, or lesions   All other review of systems is negative unless indicated above.    PE:  Constitutional: NAD, laying in bed  HEENT: NC/AT  Back: no tenderness  Respiratory: respirations even and non labored, coarse breath sounds throughout lung fields.   Cardiovascular: S1S2 regular, no murmurs  Abdomen: soft, not tender, not distended, positive BS  Genitourinary: voiding  Rectal: deferred  Musculoskeletal: no muscle tenderness, no joint swelling or tenderness                9.0    17.40 )-----------( 75       ( 10 Dec 2019 09:58 )             30.8         142  |  117<H>  |  82<H>  ----------------------------<  100<H>  4.7   |  17<L>  |  2.77<H>    Ca    7.2<L>      09 Dec 2019 22:39    TPro  5.3<L>  /  Alb  2.2<L>  /  TBili  0.5  /  DBili  x   /  AST  15  /  ALT  17  /  AlkPhos  101       LIVER FUNCTIONS - ( 09 Dec 2019 12:27 )  Alb: 2.2 g/dL / Pro: 5.3 gm/dL / ALK PHOS: 101 U/L / ALT: 17 U/L / AST: 15 U/L / GGT: x           Urinalysis Basic - ( 09 Dec 2019 12:51 )    Color: Yellow / Appearance: Slightly Turbid / S.015 / pH: x  Gluc: x / Ketone: Trace  / Bili: Negative / Urobili: Negative mg/dL   Blood: x / Protein: 30 mg/dL / Nitrite: Negative   Leuk Esterase: Moderate / RBC: 6-10 /HPF / WBC >50   Sq Epi: x / Non Sq Epi: Few / Bacteria: Few        < from: Xray Chest 1 View AP/PA. (19 @ 11:45) >    EXAM:  XR CHEST 1 VIEW                            PROCEDURE DATE:  2019          INTERPRETATION:  Short of breath.    AP chest. Prior 2019.    Chin obscures portion right apex. Low lung volumes. No change heart   mediastinum. Right chestport reidentified in situ. There is new focal   consolidation the right lower lobe likely representing pneumonia and   atelectasis in the appropriate clinical setting. New small right pleural   effusion.    Impression: Right lower lobe consolidation and small right parapneumonic   effusion      MEDICATIONS  (STANDING):  aspirin enteric coated 325 milliGRAM(s) Oral daily  atorvastatin 10 milliGRAM(s) Oral at bedtime  cefTRIAXone Injectable. 1000 milliGRAM(s) IV Push every 24 hours  clonazePAM  Tablet 1 milliGRAM(s) Oral two times a day  clonazePAM  Tablet 0.5 milliGRAM(s) Oral daily  doxycycline hyclate Capsule 100 milliGRAM(s) Oral every 12 hours  ferrous    sulfate 325 milliGRAM(s) Oral two times a day  folic acid 1 milliGRAM(s) Oral daily  heparin  Injectable 5000 Unit(s) SubCutaneous every 12 hours  melatonin 3 milliGRAM(s) Oral at bedtime  metoprolol succinate ER 50 milliGRAM(s) Oral daily  mirtazapine 7.5 milliGRAM(s) Oral at bedtime  oxyCODONE    IR 5 milliGRAM(s) Oral two times a day  pantoprazole    Tablet 40 milliGRAM(s) Oral before breakfast  sertraline 100 milliGRAM(s) Oral daily  sodium chloride 0.9%. 1000 milliLiter(s) (90 mL/Hr) IV Continuous <Continuous>    MEDICATIONS  (PRN):  acetaminophen   Tablet .. 1000 milliGRAM(s) Oral every 12 hours PRN Moderate Pain (4 - 6)  zolpidem 5 milliGRAM(s) Oral at bedtime PRN Insomnia  zolpidem 5 milliGRAM(s) Oral at bedtime PRN Insomnia

## 2019-12-10 NOTE — PROGRESS NOTE ADULT - ASSESSMENT
PLAN    *Sepsis due to CAP will treat for gram negative rods and other resistant bacteria.  *UTI  *DAMON on CKD stage 2- suspect prerenal with poor PO intake  - Cxray showed right lower lobe consolidation and small right parapneumonic effusion  - ID consult  - follow cultures  - supplemental O2 as needed  - monitor temp  - on doxy and ceftriaxone  - monitor in light of PCN allergy.  - IVF hydration  - nephrology consult for DAMON  - monitor creatinine   - hold spironolactone for now  - renally dose meds  - DVT prophylaxis    Case d/w team and MD on IDR. Plan explained to patient and all of their concerns were addressed.

## 2019-12-10 NOTE — CONSULT NOTE ADULT - SUBJECTIVE AND OBJECTIVE BOX
Patient seen and examined, full consult to follow Patient is a 84y Female whom presented to the hospital with HTN, HLD, arrythmia, CKD III presenting to the ED via EMS form [facility] c/o SOB and  weakness, decreased appetitite with renal evaluation of DAMON. Patient reports poor intake x 1 week, per chart falls as well at facility. She reports still no appetite but notes better uop with IVF started.       PAST MEDICAL & SURGICAL HISTORY:  Chronic kidney disease  GERD (gastroesophageal reflux disease)  Urinary incontinence  Diarrhea  Osteoporosis  Obesity  Hyperlipidemia  Hypertension  Abdominal pain  Anxiety  Anemia  Myeloma  Nephrolithiasis  S/P cataract surgery: b/l  S/P colonoscopy  S/P tonsillectomy  Encounter for cholecystectomy  Abnormal biopsy result: abdomen May 2019  H/O total hip arthroplasty, bilateral  History of bilateral knee arthroplasty  H/O total hysterectomy  History of hernia repair      MEDICATIONS  (STANDING):  aspirin enteric coated 325 milliGRAM(s) Oral daily  atorvastatin 10 milliGRAM(s) Oral at bedtime  cefTRIAXone Injectable. 1000 milliGRAM(s) IV Push every 24 hours  clonazePAM  Tablet 1 milliGRAM(s) Oral two times a day  clonazePAM  Tablet 0.5 milliGRAM(s) Oral daily  doxycycline hyclate Capsule 100 milliGRAM(s) Oral every 12 hours  ferrous    sulfate 325 milliGRAM(s) Oral two times a day  folic acid 1 milliGRAM(s) Oral daily  heparin  Injectable 5000 Unit(s) SubCutaneous every 12 hours  melatonin 3 milliGRAM(s) Oral at bedtime  metoprolol succinate ER 50 milliGRAM(s) Oral daily  mirtazapine 7.5 milliGRAM(s) Oral at bedtime  oxyCODONE    IR 5 milliGRAM(s) Oral two times a day  pantoprazole    Tablet 40 milliGRAM(s) Oral before breakfast  sertraline 100 milliGRAM(s) Oral daily  sodium chloride 0.9%. 1000 milliLiter(s) (125 mL/Hr) IV Continuous <Continuous>    MEDICATIONS  (PRN):  acetaminophen   Tablet .. 1000 milliGRAM(s) Oral every 12 hours PRN Moderate Pain (4 - 6)  zolpidem 5 milliGRAM(s) Oral at bedtime PRN Insomnia  zolpidem 5 milliGRAM(s) Oral at bedtime PRN Insomnia      Allergies    Erythromycin Base (Other)  penicillin (Rash)    Intolerances        SOCIAL HISTORY:    no etoh/cigg    FAMILY HISTORY:  no renal disease known    REVIEW OF SYSTEMS:    CONSTITUTIONAL: stable weakness, no fevers or chills  EYES/ENT: No visual changes;  No vertigo or throat pain   NECK: No pain or stiffness  RESPIRATORY: No cough, wheezing, hemoptysis; No shortness of breath  CARDIOVASCULAR: No chest pain or palpitations  GASTROINTESTINAL: No abdominal or epigastric pain. No nausea, vomiting, or hematemesis; No diarrhea or constipation. No melena or hematochezia.  GENITOURINARY: No dysuria, frequency or hematuria  NEUROLOGICAL: No numbness or weakness  SKIN: No itching, burning, rashes, or lesions   All other review of systems is negative unless indicated above.      T(C): , Max: 37.1 (19 @ 18:48)  T(F): , Max: 98.7 (19 @ 18:48)  HR: 91 (12-10-19 @ 11:01)  BP: 93/55 (12-10-19 @ 11:01)  BP(mean): 55 (12-10-19 @ 05:16)  RR: 18 (12-10-19 @ 11:01)  SpO2: 93% (12-10-19 @ 11:01)  Wt(kg): --        PHYSICAL EXAM:    Constitutional: NAD,frail  HEENT: temp wasting  Neck: No LAD, No JVD  Respiratory: CTAB  Cardiovascular: S1 and S2, RRR  Gastrointestinal: BS+, soft, NT/ND  Extremities: No peripheral edema  Neurological: Alert  : No Herbert  Skin: No rashes  Access: Not applicable        LABS:                        9.0    17.40 )-----------( 75       ( 10 Dec 2019 09:58 )             30.8     09 Dec 2019 22:39    142    |  117    |  82     ----------------------------<  100    4.7     |  17     |  2.77   09 Dec 2019 12:27    141    |  115    |  94     ----------------------------<  104    5.5     |  17     |  3.17     Ca    7.2        09 Dec 2019 22:39  Ca    7.4        09 Dec 2019 12:27    TPro  5.3    /  Alb  2.2    /  TBili  0.5    /  DBili  x      /  AST  15     /  ALT  17     /  AlkPhos  101    09 Dec 2019 12:27          Urine Studies:  Urinalysis Basic - ( 09 Dec 2019 12:51 )    Color: Yellow / Appearance: Slightly Turbid / S.015 / pH: x  Gluc: x / Ketone: Trace  / Bili: Negative / Urobili: Negative mg/dL   Blood: x / Protein: 30 mg/dL / Nitrite: Negative   Leuk Esterase: Moderate / RBC: 6-10 /HPF / WBC >50   Sq Epi: x / Non Sq Epi: Few / Bacteria: Few            RADIOLOGY & ADDITIONAL STUDIES:

## 2019-12-11 LAB
-  AMIKACIN: SIGNIFICANT CHANGE UP
-  AMPICILLIN/SULBACTAM: SIGNIFICANT CHANGE UP
-  AMPICILLIN: SIGNIFICANT CHANGE UP
-  AZTREONAM: SIGNIFICANT CHANGE UP
-  CEFAZOLIN: SIGNIFICANT CHANGE UP
-  CEFEPIME: SIGNIFICANT CHANGE UP
-  CEFOXITIN: SIGNIFICANT CHANGE UP
-  CEFTRIAXONE: SIGNIFICANT CHANGE UP
-  CIPROFLOXACIN: SIGNIFICANT CHANGE UP
-  GENTAMICIN: SIGNIFICANT CHANGE UP
-  IMIPENEM: SIGNIFICANT CHANGE UP
-  LEVOFLOXACIN: SIGNIFICANT CHANGE UP
-  MEROPENEM: SIGNIFICANT CHANGE UP
-  NITROFURANTOIN: SIGNIFICANT CHANGE UP
-  PIPERACILLIN/TAZOBACTAM: SIGNIFICANT CHANGE UP
-  TIGECYCLINE: SIGNIFICANT CHANGE UP
-  TOBRAMYCIN: SIGNIFICANT CHANGE UP
-  TRIMETHOPRIM/SULFAMETHOXAZOLE: SIGNIFICANT CHANGE UP
ANION GAP SERPL CALC-SCNC: 8 MMOL/L — SIGNIFICANT CHANGE UP (ref 5–17)
BUN SERPL-MCNC: 72 MG/DL — HIGH (ref 7–23)
CALCIUM SERPL-MCNC: 7 MG/DL — LOW (ref 8.5–10.1)
CHLORIDE SERPL-SCNC: 120 MMOL/L — HIGH (ref 96–108)
CO2 SERPL-SCNC: 16 MMOL/L — LOW (ref 22–31)
CREAT SERPL-MCNC: 2.26 MG/DL — HIGH (ref 0.5–1.3)
CULTURE RESULTS: SIGNIFICANT CHANGE UP
GLUCOSE SERPL-MCNC: 93 MG/DL — SIGNIFICANT CHANGE UP (ref 70–99)
HCT VFR BLD CALC: 33 % — LOW (ref 34.5–45)
HGB BLD-MCNC: 9.7 G/DL — LOW (ref 11.5–15.5)
MCHC RBC-ENTMCNC: 29.4 GM/DL — LOW (ref 32–36)
MCHC RBC-ENTMCNC: 29.7 PG — SIGNIFICANT CHANGE UP (ref 27–34)
MCV RBC AUTO: 100.9 FL — HIGH (ref 80–100)
METHOD TYPE: SIGNIFICANT CHANGE UP
ORGANISM # SPEC MICROSCOPIC CNT: SIGNIFICANT CHANGE UP
ORGANISM # SPEC MICROSCOPIC CNT: SIGNIFICANT CHANGE UP
PLATELET # BLD AUTO: 76 K/UL — LOW (ref 150–400)
POTASSIUM SERPL-MCNC: 4.3 MMOL/L — SIGNIFICANT CHANGE UP (ref 3.5–5.3)
POTASSIUM SERPL-SCNC: 4.3 MMOL/L — SIGNIFICANT CHANGE UP (ref 3.5–5.3)
RBC # BLD: 3.27 M/UL — LOW (ref 3.8–5.2)
RBC # FLD: 17.5 % — HIGH (ref 10.3–14.5)
SODIUM SERPL-SCNC: 144 MMOL/L — SIGNIFICANT CHANGE UP (ref 135–145)
SPECIMEN SOURCE: SIGNIFICANT CHANGE UP
WBC # BLD: 18.64 K/UL — HIGH (ref 3.8–10.5)
WBC # FLD AUTO: 18.64 K/UL — HIGH (ref 3.8–10.5)

## 2019-12-11 PROCEDURE — 93010 ELECTROCARDIOGRAM REPORT: CPT

## 2019-12-11 RX ORDER — SODIUM CHLORIDE 9 MG/ML
1000 INJECTION, SOLUTION INTRAVENOUS
Refills: 0 | Status: DISCONTINUED | OUTPATIENT
Start: 2019-12-11 | End: 2019-12-17

## 2019-12-11 RX ORDER — SIMETHICONE 80 MG/1
80 TABLET, CHEWABLE ORAL ONCE
Refills: 0 | Status: COMPLETED | OUTPATIENT
Start: 2019-12-11 | End: 2019-12-11

## 2019-12-11 RX ORDER — METOPROLOL TARTRATE 50 MG
2.5 TABLET ORAL ONCE
Refills: 0 | Status: COMPLETED | OUTPATIENT
Start: 2019-12-11 | End: 2019-12-11

## 2019-12-11 RX ADMIN — Medication 100 MILLIGRAM(S): at 18:20

## 2019-12-11 RX ADMIN — Medication 3 MILLIGRAM(S): at 21:14

## 2019-12-11 RX ADMIN — Medication 325 MILLIGRAM(S): at 18:20

## 2019-12-11 RX ADMIN — Medication 100 MILLIGRAM(S): at 05:59

## 2019-12-11 RX ADMIN — Medication 1 MILLIGRAM(S): at 13:15

## 2019-12-11 RX ADMIN — Medication 1 MILLIGRAM(S): at 06:00

## 2019-12-11 RX ADMIN — SERTRALINE 100 MILLIGRAM(S): 25 TABLET, FILM COATED ORAL at 13:16

## 2019-12-11 RX ADMIN — CEFTRIAXONE 1000 MILLIGRAM(S): 500 INJECTION, POWDER, FOR SOLUTION INTRAMUSCULAR; INTRAVENOUS at 18:20

## 2019-12-11 RX ADMIN — ATORVASTATIN CALCIUM 10 MILLIGRAM(S): 80 TABLET, FILM COATED ORAL at 21:14

## 2019-12-11 RX ADMIN — MIRTAZAPINE 7.5 MILLIGRAM(S): 45 TABLET, ORALLY DISINTEGRATING ORAL at 21:14

## 2019-12-11 RX ADMIN — Medication 0.5 MILLIGRAM(S): at 13:09

## 2019-12-11 RX ADMIN — Medication 1 MILLIGRAM(S): at 18:20

## 2019-12-11 RX ADMIN — Medication 325 MILLIGRAM(S): at 05:59

## 2019-12-11 RX ADMIN — PANTOPRAZOLE SODIUM 40 MILLIGRAM(S): 20 TABLET, DELAYED RELEASE ORAL at 06:00

## 2019-12-11 RX ADMIN — Medication 325 MILLIGRAM(S): at 13:15

## 2019-12-11 RX ADMIN — SIMETHICONE 80 MILLIGRAM(S): 80 TABLET, CHEWABLE ORAL at 21:14

## 2019-12-11 RX ADMIN — Medication 50 MILLIGRAM(S): at 05:59

## 2019-12-11 RX ADMIN — Medication 2.5 MILLIGRAM(S): at 20:14

## 2019-12-11 RX ADMIN — SODIUM CHLORIDE 70 MILLILITER(S): 9 INJECTION, SOLUTION INTRAVENOUS at 13:10

## 2019-12-11 RX ADMIN — HEPARIN SODIUM 5000 UNIT(S): 5000 INJECTION INTRAVENOUS; SUBCUTANEOUS at 18:21

## 2019-12-11 RX ADMIN — OXYCODONE HYDROCHLORIDE 5 MILLIGRAM(S): 5 TABLET ORAL at 18:28

## 2019-12-11 NOTE — CONSULT NOTE ADULT - SUBJECTIVE AND OBJECTIVE BOX
Patient is a 84y old  Female who presents with a chief complaint of Pneumonia with parapneumonic effusion  UTI  ARF  WEakness  sepsis (11 Dec 2019 17:13)      HPI:  Pt is an 85 y/o F with PMHx of HTN, HLD, arrythmia, CKD II presenting to the ED via EMS form [facility] c/o SOB and  weakness, decreased appetitite. Per EMS, pt reported that she fell out of bed twice within the past 2 days, but did not fall today.   Pt states she does not know why she is here, she is a poor historian.  She denies cp, URI or UTI symptoms.  She requests to return to the nursing facility.   Pt takes 325mg ASA.     Pt uses walker and wheelchair at baseline.     Fam HX:   unavailable (09 Dec 2019 14:51)       ROS:  Negative except for:    PAST MEDICAL & SURGICAL HISTORY:  Chronic kidney disease  GERD (gastroesophageal reflux disease)  Urinary incontinence  Diarrhea  Osteoporosis  Obesity  Hyperlipidemia  Hypertension  Abdominal pain  Anxiety  Anemia  Myeloma  Nephrolithiasis  S/P cataract surgery: b/l  S/P colonoscopy  S/P tonsillectomy  Encounter for cholecystectomy  Abnormal biopsy result: abdomen May 2019  H/O total hip arthroplasty, bilateral  History of bilateral knee arthroplasty  H/O total hysterectomy  History of hernia repair      SOCIAL HISTORY:    FAMILY HISTORY:      MEDICATIONS  (STANDING):  aspirin enteric coated 325 milliGRAM(s) Oral daily  atorvastatin 10 milliGRAM(s) Oral at bedtime  cefTRIAXone Injectable. 1000 milliGRAM(s) IV Push every 24 hours  clonazePAM  Tablet 1 milliGRAM(s) Oral two times a day  clonazePAM  Tablet 0.5 milliGRAM(s) Oral daily  doxycycline hyclate Capsule 100 milliGRAM(s) Oral every 12 hours  ferrous    sulfate 325 milliGRAM(s) Oral two times a day  folic acid 1 milliGRAM(s) Oral daily  heparin  Injectable 5000 Unit(s) SubCutaneous every 12 hours  lactated ringers. 1000 milliLiter(s) (70 mL/Hr) IV Continuous <Continuous>  melatonin 3 milliGRAM(s) Oral at bedtime  metoprolol succinate ER 50 milliGRAM(s) Oral daily  mirtazapine 7.5 milliGRAM(s) Oral at bedtime  oxyCODONE    IR 5 milliGRAM(s) Oral two times a day  pantoprazole    Tablet 40 milliGRAM(s) Oral before breakfast  sertraline 100 milliGRAM(s) Oral daily    MEDICATIONS  (PRN):  acetaminophen   Tablet .. 1000 milliGRAM(s) Oral every 12 hours PRN Moderate Pain (4 - 6)  zolpidem 5 milliGRAM(s) Oral at bedtime PRN Insomnia  zolpidem 5 milliGRAM(s) Oral at bedtime PRN Insomnia      Allergies    Erythromycin Base (Other)  penicillin (Rash)    Intolerances        Vital Signs Last 24 Hrs  T(C): 36.5 (11 Dec 2019 20:49), Max: 36.5 (11 Dec 2019 12:44)  T(F): 97.7 (11 Dec 2019 20:49), Max: 97.7 (11 Dec 2019 12:44)  HR: 100 (11 Dec 2019 20:49) (75 - 113)  BP: 92/60 (11 Dec 2019 20:49) (89/58 - 102/52)  BP(mean): --  RR: 17 (11 Dec 2019 20:49) (17 - 18)  SpO2: 96% (11 Dec 2019 20:49) (95% - 97%)    PHYSICAL EXAM  General: adult in NAD  HEENT: clear oropharynx, anicteric sclera, pink conjunctiva  Neck: supple  CV: normal S1/S2 with no murmur rubs or gallops  Lungs: positive air movement b/l ant lungs,clear to auscultation, no wheezes, no rales  Abdomen: soft non-tender non-distended, no hepatosplenomegaly  Ext: no clubbing cyanosis or edema  Skin: no rashes and no petechiae  Neuro: alert and oriented X 4, no focal deficits      LABS:                          9.7    18.64 )-----------( 76       ( 11 Dec 2019 07:20 )             33.0         Mean Cell Volume : 100.9 fl  Mean Cell Hemoglobin : 29.7 pg  Mean Cell Hemoglobin Concentration : 29.4 gm/dL  Auto Neutrophil # : x  Auto Lymphocyte # : x  Auto Monocyte # : x  Auto Eosinophil # : x  Auto Basophil # : x  Auto Neutrophil % : x  Auto Lymphocyte % : x  Auto Monocyte % : x  Auto Eosinophil % : x  Auto Basophil % : x      Serial CBC's  12-11 @ 07:20  Hct-33.0 / Hgb-9.7 / Plat-76 / RBC-3.27 / WBC-18.64  Serial CBC's  12-10 @ 09:58  Hct-30.8 / Hgb-9.0 / Plat-75 / RBC-3.10 / WBC-17.40      12-11    144  |  120<H>  |  72<H>  ----------------------------<  93  4.3   |  16<L>  |  2.26<H>    Ca    7.0<L>      11 Dec 2019 07:20                        RADIOLOGY & ADDITIONAL STUDIES:

## 2019-12-11 NOTE — PROGRESS NOTE ADULT - ASSESSMENT
84 HTN, HLD, arrythmia, CKD III presenting to the ED via EMS form [facility] c/o SOB and  weakness, decreased appetitive with renal evaluation of DAMON.     DAMON  -Suspect pre-renal with poor intake and diuretic use, stabilzing with hydration  -Maintain IVF until po at goal, can convert to LR with rising Na and acidosis. Will monitor serum K with LR starting  -Renal imaging if function does not continue to normalize  -Maintain MAP, optimize intake    CKD III  -baseline near 1.5 mg/dl  -NSAID avoidance    FTT/Poor intake  -F/u cultures  -Dietary optimization as able    d/c with RN staff and Dr Hein

## 2019-12-11 NOTE — CHART NOTE - NSCHARTNOTEFT_GEN_A_CORE
Was called by 3N RN regarding of new onset of Afib with RVR at 120s-140s.   Pt was seen at the bedside, denies palpitation, sob, headache/ light headedness or chest discomfort, reported to have abdominal pain, which seems non- acute complaining (CT abd; negative for SBO)    Stat EKG: Afib with RVR at rate 129 bpm,   Tele reviewed: arrythmia in and out earioer, noted be on afib around 7 pm.     Physical exam  VS; low 90s/60s, HR: 120s-140s currently.   Vital Signs;  T(C): 36.5 (12-11-19 @ 12:44), Max: 36.6 (12-10-19 @ 20:55)  HR: 99 (12-11-19 @ 19:32) (75 - 99)  BP: 90/60 (12-11-19 @ 19:32) (85/53 - 102/52)  RR: 18 (12-11-19 @ 12:44) (18 - 18)  SpO2: 95% (12-11-19 @ 12:44) (94% - 97%)    Constitutional: well developed, well nourished, no deformities and no acute distress    Neurological: Alert & Oriented    HEENT: NC/AT     Respiratory: CTA B/L, No wheezing/crackles/rhonchi    Cardiovascular: (+) S1 & S2, irregularly irregular, No murmur/ rub/ gallop     Gastrointestinal: soft, Nontender, + distended, + hyperactive, tympanic sound percussion     Genitourinary: urinary drainage in place     Extremities: No pedal edema, No clubbing, No cyanosis, 2+  PT/ DP pulses       MEDICATIONS  (STANDING):  aspirin enteric coated 325 milliGRAM(s) Oral daily  atorvastatin 10 milliGRAM(s) Oral at bedtime  cefTRIAXone Injectable. 1000 milliGRAM(s) IV Push every 24 hours  clonazePAM  Tablet 1 milliGRAM(s) Oral two times a day  clonazePAM  Tablet 0.5 milliGRAM(s) Oral daily  doxycycline hyclate Capsule 100 milliGRAM(s) Oral every 12 hours  ferrous    sulfate 325 milliGRAM(s) Oral two times a day  folic acid 1 milliGRAM(s) Oral daily  heparin  Injectable 5000 Unit(s) SubCutaneous every 12 hours  lactated ringers. 1000 milliLiter(s) (70 mL/Hr) IV Continuous <Continuous>  melatonin 3 milliGRAM(s) Oral at bedtime  metoprolol succinate ER 50 milliGRAM(s) Oral daily  mirtazapine 7.5 milliGRAM(s) Oral at bedtime  oxyCODONE    IR 5 milliGRAM(s) Oral two times a day  pantoprazole    Tablet 40 milliGRAM(s) Oral before breakfast  sertraline 100 milliGRAM(s) Oral daily  simethicone 80 milliGRAM(s) Chew once    MEDICATIONS  (PRN):  acetaminophen   Tablet .. 1000 milliGRAM(s) Oral every 12 hours PRN Moderate Pain (4 - 6)  zolpidem 5 milliGRAM(s) Oral at bedtime PRN Insomnia  zolpidem 5 milliGRAM(s) Oral at bedtime PRN Insomnia     Labs:                     9.7    18.64 )-----------( 76       ( 11 Dec 2019 07:20 )             33.0   12-11    144  |  120<H>  |  72<H>  ----------------------------<  93  4.3   |  16<L>  |  2.26<H>    Ca    7.0<L>      11 Dec 2019 07:20    A/P  83 y/o F, poor historian with PMHx of HTN, HLD, arrythmia (p. Afib documented in 6/2019, as chart reviewed), CKD II, myeloma, recent fall presenting to the ED via EMS form [facility] c/o SOB and  weakness, decreased appetite found to have sepsis secondary to PNA and UTI. This evening, Pt developed Afib with RVR at 120s-140s, more likely in setting of infection. Currently pt is on  mg and Metoprolol 50 mg daily.     # recurrent Afib with RVR, likely in setting of acute infection   - continue  mg daily (not candidate for AC in setting of multiple history of fall and thrombocytopenia)   - rate control: given lopressor 2.5 mg IVP x1 with good response (HR down to 110s-120s), continue Metoprolol 50 daily   - monitor BP, if SBP < 80, may increase fluid to 100 cc/hr.   - cardiology consult in am   - repeat EKG if any changes     Plan was discussed with 3N RN.

## 2019-12-11 NOTE — PROGRESS NOTE ADULT - SUBJECTIVE AND OBJECTIVE BOX
Date of service: 12-11-19 @ 15:09      Patient lying in bed; afebrile, tearful of being in the hospital      ROS unable to obtain secondary to patient medical condition     MEDICATIONS  (STANDING):  aspirin enteric coated 325 milliGRAM(s) Oral daily  atorvastatin 10 milliGRAM(s) Oral at bedtime  cefTRIAXone Injectable. 1000 milliGRAM(s) IV Push every 24 hours  clonazePAM  Tablet 1 milliGRAM(s) Oral two times a day  clonazePAM  Tablet 0.5 milliGRAM(s) Oral daily  doxycycline hyclate Capsule 100 milliGRAM(s) Oral every 12 hours  ferrous    sulfate 325 milliGRAM(s) Oral two times a day  folic acid 1 milliGRAM(s) Oral daily  heparin  Injectable 5000 Unit(s) SubCutaneous every 12 hours  lactated ringers. 1000 milliLiter(s) (70 mL/Hr) IV Continuous <Continuous>  melatonin 3 milliGRAM(s) Oral at bedtime  metoprolol succinate ER 50 milliGRAM(s) Oral daily  mirtazapine 7.5 milliGRAM(s) Oral at bedtime  oxyCODONE    IR 5 milliGRAM(s) Oral two times a day  pantoprazole    Tablet 40 milliGRAM(s) Oral before breakfast  sertraline 100 milliGRAM(s) Oral daily    MEDICATIONS  (PRN):  acetaminophen   Tablet .. 1000 milliGRAM(s) Oral every 12 hours PRN Moderate Pain (4 - 6)  zolpidem 5 milliGRAM(s) Oral at bedtime PRN Insomnia  zolpidem 5 milliGRAM(s) Oral at bedtime PRN Insomnia      Vital Signs Last 24 Hrs  T(C): 36.5 (11 Dec 2019 12:44), Max: 36.6 (10 Dec 2019 20:55)  T(F): 97.7 (11 Dec 2019 12:44), Max: 97.9 (10 Dec 2019 20:55)  HR: 83 (11 Dec 2019 12:44) (75 - 99)  BP: 95/61 (11 Dec 2019 12:44) (85/53 - 102/52)  BP(mean): --  RR: 18 (11 Dec 2019 12:44) (18 - 18)  SpO2: 95% (11 Dec 2019 12:44) (94% - 97%)    Physical Exam:      PE:    Constitutional: frail looking  HEENT: NC/AT, EOMI, PERRLA, conjunctivae clear; ears and nose atraumatic; pharynx clear  Neck: supple; thyroid not palpable  Back: no tenderness  Respiratory: respiratory effort normal; scattered coarse breath sounds  Cardiovascular: S1S2 regular, no murmurs  Abdomen: soft, not tender, not distended, positive BS; no liver or spleen organomegaly  Genitourinary: no suprapubic tenderness  Musculoskeletal: no muscle tenderness, no joint swelling or tenderness  Neurological/ Psychiatric:   moving all extremities  Skin: no rashes; no palpable lesions    Labs: all available labs reviewed                         Labs:                        9.7    18.64 )-----------( 76       ( 11 Dec 2019 07:20 )             33.0     12-11    144  |  120<H>  |  72<H>  ----------------------------<  93  4.3   |  16<L>  |  2.26<H>    Ca    7.0<L>      11 Dec 2019 07:20             Cultures:       Culture - Blood (collected 12-09-19 @ 13:47)  Source: .Blood None  Preliminary Report (12-10-19 @ 18:01):    No growth to date.    Culture - Blood (collected 12-09-19 @ 13:38)  Source: .Blood None  Preliminary Report (12-10-19 @ 18:01):    No growth to date.    Culture - Urine (collected 12-09-19 @ 12:51)  Source: .Urine None  Preliminary Report (12-10-19 @ 15:52):    >100,000 CFU/ml Gram Negative Rods          < from: Xray Chest 1 View AP/PA. (12.09.19 @ 11:45) >    EXAM:  XR CHEST 1 VIEW                            PROCEDURE DATE:  12/09/2019          INTERPRETATION:  Short of breath.    AP chest. Prior 6/17/2019.    Chin obscures portion right apex. Low lung volumes. No change heart   mediastinum. Right chestport reidentified in situ. There is new focal   consolidation the right lower lobe likely representing pneumonia and   atelectasis in the appropriate clinical setting. New small right pleural   effusion.    Impression: Right lower lobe consolidation and small right parapneumonic   effusion      < end of copied text >          Radiology: all available radiological tests reviewed    Advanced directives addressed: full resuscitation

## 2019-12-11 NOTE — PROGRESS NOTE ADULT - ASSESSMENT
Pt is an 85 y/o F with PMHx of HTN, HLD, arrythmia, CKD II admitted from assisted living on 12/9 for evaluation of shortness of breath and weakness; patient is unclear as to why she is in hospital; history per medical record as patient is poor historian. She gives history of penicillin allergy over 50 years ago and she does not recall her reaction.     1. Patient admitted with pneumonia which will treat as community acquired pneumonia; also noted with leukocytosis most likely reactive to infection  - follow up cultures   - oxygen and nebs as needed   - serial cbc and monitor temperature   - reviewed prior medical records to evaluate for resistant or atypical pathogens   - iv hydration and supportive care   - day #2 ceftriaxone and doxycycline  - tolerating antibiotics without rashes or side effects   - Monitor closely in view of history of penicillin allergy   2. other issues: per medicine

## 2019-12-11 NOTE — PROGRESS NOTE ADULT - SUBJECTIVE AND OBJECTIVE BOX
Patient is a 84y Female who reports no complaints overnight. Still limited Po intake.     REVIEW OF SYSTEMS:    CONSTITUTIONAL: stable weakness, fevers or chills  RESPIRATORY: No cough, wheezing, hemoptysis; No shortness of breath  CARDIOVASCULAR: No chest pain or palpitations  GENITOURINARY: No dysuria, frequency or hematuria  All other review of systems is negative unless indicated above.    MEDICATIONS  (STANDING):  aspirin enteric coated 325 milliGRAM(s) Oral daily  atorvastatin 10 milliGRAM(s) Oral at bedtime  cefTRIAXone Injectable. 1000 milliGRAM(s) IV Push every 24 hours  clonazePAM  Tablet 1 milliGRAM(s) Oral two times a day  clonazePAM  Tablet 0.5 milliGRAM(s) Oral daily  doxycycline hyclate Capsule 100 milliGRAM(s) Oral every 12 hours  ferrous    sulfate 325 milliGRAM(s) Oral two times a day  folic acid 1 milliGRAM(s) Oral daily  heparin  Injectable 5000 Unit(s) SubCutaneous every 12 hours  melatonin 3 milliGRAM(s) Oral at bedtime  metoprolol succinate ER 50 milliGRAM(s) Oral daily  mirtazapine 7.5 milliGRAM(s) Oral at bedtime  oxyCODONE    IR 5 milliGRAM(s) Oral two times a day  pantoprazole    Tablet 40 milliGRAM(s) Oral before breakfast  sertraline 100 milliGRAM(s) Oral daily  sodium chloride 0.9%. 1000 milliLiter(s) (90 mL/Hr) IV Continuous <Continuous>    MEDICATIONS  (PRN):  acetaminophen   Tablet .. 1000 milliGRAM(s) Oral every 12 hours PRN Moderate Pain (4 - 6)  zolpidem 5 milliGRAM(s) Oral at bedtime PRN Insomnia  zolpidem 5 milliGRAM(s) Oral at bedtime PRN Insomnia        T(C): , Max: 36.6 (12-10-19 @ 20:55)  T(F): , Max: 97.9 (12-10-19 @ 20:55)  HR: 75 (19 @ 05:09)  BP: 102/52 (19 @ 05:09)  BP(mean): --  RR: 18 (19 @ 05:09)  SpO2: 97% (19 @ 05:09)  Wt(kg): --    12-10 @ 07:01  -   @ 07:00  --------------------------------------------------------  IN: 0 mL / OUT: 800 mL / NET: -800 mL          PHYSICAL EXAM:    Constitutional: frail, cachectic  HEENT: PERRLA, EOMI,  MMM  Neck: No LAD, No JVD  Respiratory: CTAB  Cardiovascular: S1 and S2, RRR  Gastrointestinal: BS+, soft mild dist  Extremities: No peripheral edema  Neurological: A/O x 3, no focal deficitst  Skin: No rashes  Access: Not applicable        LABS:                        9.7    18.64 )-----------( 76       ( 11 Dec 2019 07:20 )             33.0     11 Dec 2019 07:20    144    |  120    |  72     ----------------------------<  93     4.3     |  16     |  2.26   09 Dec 2019 22:39    142    |  117    |  82     ----------------------------<  100    4.7     |  17     |  2.77   09 Dec 2019 12:27    141    |  115    |  94     ----------------------------<  104    5.5     |  17     |  3.17     Ca    7.0        11 Dec 2019 07:20  Ca    7.2        09 Dec 2019 22:39  Ca    7.4        09 Dec 2019 12:27    TPro  5.3    /  Alb  2.2    /  TBili  0.5    /  DBili  x      /  AST  15     /  ALT  17     /  AlkPhos  101    09 Dec 2019 12:27          Urine Studies:  Urinalysis Basic - ( 09 Dec 2019 12:51 )    Color: Yellow / Appearance: Slightly Turbid / S.015 / pH: x  Gluc: x / Ketone: Trace  / Bili: Negative / Urobili: Negative mg/dL   Blood: x / Protein: 30 mg/dL / Nitrite: Negative   Leuk Esterase: Moderate / RBC: 6-10 /HPF / WBC >50   Sq Epi: x / Non Sq Epi: Few / Bacteria: Few            RADIOLOGY & ADDITIONAL STUDIES:

## 2019-12-11 NOTE — PROGRESS NOTE ADULT - SUBJECTIVE AND OBJECTIVE BOX
Patient is a 83 y/o F with PMHx of HTN, HLD, arrythmia, CKD II presenting to the ED via EMS form [facility] c/o SOB and  weakness, decreased appetitite. Per EMS, pt reported that she fell out of bed twice within the past 2 days, but did not fall today. Patient is a poor historian. Admitted for pneumonia and UTI- started on IV antibiotics.     12/11:  patient was seen and examined. Tearful- stated that she feels scared. Denies chest pain, fever or chills.     REVIEW OF SYSTEMS:  CONSTITUTIONAL: No weakness, fevers or chills  EYES/ENT: No visual changes;  No vertigo or throat pain   NECK: No pain or stiffness  RESPIRATORY: No cough, wheezing, hemoptysis; No shortness of breath  CARDIOVASCULAR: No chest pain or palpitations  GASTROINTESTINAL: No abdominal or epigastric pain. No nausea, vomiting, or hematemesis; No diarrhea or constipation. No melena or hematochezia.  GENITOURINARY: No dysuria, frequency or hematuria  NEUROLOGICAL: No numbness or weakness  SKIN: No itching, burning, rashes, or lesions   All other review of systems is negative unless indicated above.    PE:  Constitutional: NAD, laying in bed  HEENT: NC/AT  Back: no tenderness  Respiratory: respirations even and non labored, coarse breath sounds throughout lung fields.   Cardiovascular: S1S2 regular, no murmurs  Abdomen: soft, not tender, not distended, positive BS  Genitourinary: voiding  Rectal: deferred  Musculoskeletal: no muscle tenderness, no joint swelling or tenderness               CBC Full  -  ( 11 Dec 2019 07:20 )  WBC Count : 18.64 K/uL  RBC Count : 3.27 M/uL  Hemoglobin : 9.7 g/dL  Hematocrit : 33.0 %  Platelet Count - Automated : 76 K/uL    144  |  120<H>  |  72<H>  ----------------------------<  93  4.3   |  16<L>  |  2.26<H>    Ca    7.0<L>      11 Dec 2019 07:20       PROCEDURE DATE:  12/09/2019          INTERPRETATION:  Short of breath.    AP chest. Prior 6/17/2019.    Chin obscures portion right apex. Low lung volumes. No change heart   mediastinum. Right chestport reidentified in situ. There is new focal   consolidation the right lower lobe likely representing pneumonia and   atelectasis in the appropriate clinical setting. New small right pleural   effusion.    Impression: Right lower lobe consolidation and small right parapneumonic   effusion    * Sepsis due to CAP will treat for gram negative rods and other resistant bacteria  * UTI  * DAMON on CKD stage 2- suspect prerenal with poor PO intake  * leukocytosis / anemia /  thrombocytopenia  - Cxray showed right lower lobe consolidation and small right parapneumonic effusion  - day #2 ceftriaxone and doxycycline  - tolerating antibiotics without rashes or side effects   - Monitor closely in view of history of penicillin allergy   - supplemental O2 as needed  - nephrology consult for DAMON  - monitor creatinine   - hold spironolactone for now  - renally dose meds  - DVT prophylaxis Patient is a 83 y/o F with PMHx of HTN, HLD, arrythmia, CKD II presenting to the ED via EMS form [facility] c/o SOB and  weakness, decreased appetitite. Per EMS, pt reported that she fell out of bed twice within the past 2 days, but did not fall today. Patient is a poor historian. Admitted for pneumonia and UTI- started on IV antibiotics.     12/11:  patient was seen and examined. Tearful- stated that she feels scared. Denies chest pain, fever or chills. poor appetite.     REVIEW OF SYSTEMS:  CONSTITUTIONAL: No weakness, fevers or chills  EYES/ENT: No visual changes;  No vertigo or throat pain   NECK: No pain or stiffness  RESPIRATORY: No cough, wheezing, hemoptysis; No shortness of breath  CARDIOVASCULAR: No chest pain or palpitations  GASTROINTESTINAL: No abdominal or epigastric pain. No nausea, vomiting, or hematemesis; No diarrhea or constipation. No melena or hematochezia.  GENITOURINARY: No dysuria, frequency or hematuria  NEUROLOGICAL: No numbness or weakness  SKIN: No itching, burning, rashes, or lesions   All other review of systems is negative unless indicated above.    PE:  Constitutional: NAD, laying in bed  HEENT: NC/AT  Back: no tenderness  Respiratory: respirations even and non labored, coarse breath sounds throughout lung fields.   Cardiovascular: S1S2 regular, no murmurs  Abdomen: soft, not tender, not distended, positive BS  Genitourinary: voiding  Rectal: deferred  Musculoskeletal: no muscle tenderness, no joint swelling or tenderness               CBC Full  -  ( 11 Dec 2019 07:20 )  WBC Count : 18.64 K/uL  RBC Count : 3.27 M/uL  Hemoglobin : 9.7 g/dL  Hematocrit : 33.0 %  Platelet Count - Automated : 76 K/uL    144  |  120<H>  |  72<H>  ----------------------------<  93  4.3   |  16<L>  |  2.26<H>    Ca    7.0<L>      11 Dec 2019 07:20       PROCEDURE DATE:  12/09/2019          INTERPRETATION:  Short of breath.    AP chest. Prior 6/17/2019.    Chin obscures portion right apex. Low lung volumes. No change heart   mediastinum. Right chestport reidentified in situ. There is new focal   consolidation the right lower lobe likely representing pneumonia and   atelectasis in the appropriate clinical setting. New small right pleural   effusion.    Impression: Right lower lobe consolidation and small right parapneumonic   effusion    * Sepsis due to CAP will treat for gram negative rods and other resistant bacteria  * UTI  * DAMON on CKD stage 2- suspect prerenal with poor PO intake  * leukocytosis / anemia /  thrombocytopenia  - Cxray showed right lower lobe consolidation and small right parapneumonic effusion  - day #2 ceftriaxone and doxycycline  - tolerating antibiotics without rashes or side effects   - Monitor closely in view of history of penicillin allergy   - supplemental O2 as needed  - nephrology consult for DAMON  - monitor creatinine   - hold spironolactone for now  - renally dose meds  - DVT prophylaxis

## 2019-12-12 DIAGNOSIS — I48.0 PAROXYSMAL ATRIAL FIBRILLATION: ICD-10-CM

## 2019-12-12 DIAGNOSIS — J90 PLEURAL EFFUSION, NOT ELSEWHERE CLASSIFIED: ICD-10-CM

## 2019-12-12 LAB
ANION GAP SERPL CALC-SCNC: 9 MMOL/L — SIGNIFICANT CHANGE UP (ref 5–17)
BUN SERPL-MCNC: 70 MG/DL — HIGH (ref 7–23)
CALCIUM SERPL-MCNC: 7.6 MG/DL — LOW (ref 8.5–10.1)
CANCER AG125 SERPL-ACNC: 4518 U/ML — HIGH
CHLORIDE SERPL-SCNC: 121 MMOL/L — HIGH (ref 96–108)
CO2 SERPL-SCNC: 17 MMOL/L — LOW (ref 22–31)
CREAT SERPL-MCNC: 2.12 MG/DL — HIGH (ref 0.5–1.3)
GLUCOSE SERPL-MCNC: 110 MG/DL — HIGH (ref 70–99)
POTASSIUM SERPL-MCNC: 4.4 MMOL/L — SIGNIFICANT CHANGE UP (ref 3.5–5.3)
POTASSIUM SERPL-SCNC: 4.4 MMOL/L — SIGNIFICANT CHANGE UP (ref 3.5–5.3)
SODIUM SERPL-SCNC: 147 MMOL/L — HIGH (ref 135–145)

## 2019-12-12 PROCEDURE — 99223 1ST HOSP IP/OBS HIGH 75: CPT

## 2019-12-12 PROCEDURE — 71250 CT THORAX DX C-: CPT | Mod: 26

## 2019-12-12 RX ORDER — METOPROLOL TARTRATE 50 MG
25 TABLET ORAL
Refills: 0 | Status: DISCONTINUED | OUTPATIENT
Start: 2019-12-12 | End: 2019-12-17

## 2019-12-12 RX ORDER — MEROPENEM 1 G/30ML
500 INJECTION INTRAVENOUS EVERY 12 HOURS
Refills: 0 | Status: DISCONTINUED | OUTPATIENT
Start: 2019-12-12 | End: 2019-12-17

## 2019-12-12 RX ADMIN — Medication 325 MILLIGRAM(S): at 18:06

## 2019-12-12 RX ADMIN — MIRTAZAPINE 7.5 MILLIGRAM(S): 45 TABLET, ORALLY DISINTEGRATING ORAL at 21:16

## 2019-12-12 RX ADMIN — Medication 0.5 MILLIGRAM(S): at 13:07

## 2019-12-12 RX ADMIN — ATORVASTATIN CALCIUM 10 MILLIGRAM(S): 80 TABLET, FILM COATED ORAL at 21:16

## 2019-12-12 RX ADMIN — OXYCODONE HYDROCHLORIDE 5 MILLIGRAM(S): 5 TABLET ORAL at 06:17

## 2019-12-12 RX ADMIN — SERTRALINE 100 MILLIGRAM(S): 25 TABLET, FILM COATED ORAL at 13:07

## 2019-12-12 RX ADMIN — PANTOPRAZOLE SODIUM 40 MILLIGRAM(S): 20 TABLET, DELAYED RELEASE ORAL at 06:18

## 2019-12-12 RX ADMIN — MEROPENEM 100 MILLIGRAM(S): 1 INJECTION INTRAVENOUS at 23:56

## 2019-12-12 RX ADMIN — Medication 25 MILLIGRAM(S): at 18:15

## 2019-12-12 RX ADMIN — MEROPENEM 100 MILLIGRAM(S): 1 INJECTION INTRAVENOUS at 18:08

## 2019-12-12 RX ADMIN — Medication 100 MILLIGRAM(S): at 06:18

## 2019-12-12 RX ADMIN — Medication 1 MILLIGRAM(S): at 13:07

## 2019-12-12 RX ADMIN — Medication 3 MILLIGRAM(S): at 21:16

## 2019-12-12 RX ADMIN — Medication 100 MILLIGRAM(S): at 18:06

## 2019-12-12 RX ADMIN — Medication 1 MILLIGRAM(S): at 06:18

## 2019-12-12 RX ADMIN — HEPARIN SODIUM 5000 UNIT(S): 5000 INJECTION INTRAVENOUS; SUBCUTANEOUS at 18:06

## 2019-12-12 RX ADMIN — Medication 50 MILLIGRAM(S): at 06:18

## 2019-12-12 RX ADMIN — Medication 325 MILLIGRAM(S): at 06:18

## 2019-12-12 RX ADMIN — SODIUM CHLORIDE 70 MILLILITER(S): 9 INJECTION, SOLUTION INTRAVENOUS at 02:14

## 2019-12-12 RX ADMIN — Medication 1 MILLIGRAM(S): at 18:15

## 2019-12-12 NOTE — PROGRESS NOTE ADULT - SUBJECTIVE AND OBJECTIVE BOX
Patient is a 84y Female who reports no complaints, asleep but arousable      MEDICATIONS  (STANDING):  aspirin enteric coated 325 milliGRAM(s) Oral daily  atorvastatin 10 milliGRAM(s) Oral at bedtime  cefTRIAXone Injectable. 1000 milliGRAM(s) IV Push every 24 hours  clonazePAM  Tablet 1 milliGRAM(s) Oral two times a day  clonazePAM  Tablet 0.5 milliGRAM(s) Oral daily  doxycycline hyclate Capsule 100 milliGRAM(s) Oral every 12 hours  ferrous    sulfate 325 milliGRAM(s) Oral two times a day  folic acid 1 milliGRAM(s) Oral daily  heparin  Injectable 5000 Unit(s) SubCutaneous every 12 hours  lactated ringers. 1000 milliLiter(s) (70 mL/Hr) IV Continuous <Continuous>  melatonin 3 milliGRAM(s) Oral at bedtime  metoprolol succinate ER 50 milliGRAM(s) Oral daily  mirtazapine 7.5 milliGRAM(s) Oral at bedtime  oxyCODONE    IR 5 milliGRAM(s) Oral two times a day  pantoprazole    Tablet 40 milliGRAM(s) Oral before breakfast  sertraline 100 milliGRAM(s) Oral daily    MEDICATIONS  (PRN):  acetaminophen   Tablet .. 1000 milliGRAM(s) Oral every 12 hours PRN Moderate Pain (4 - 6)  zolpidem 5 milliGRAM(s) Oral at bedtime PRN Insomnia  zolpidem 5 milliGRAM(s) Oral at bedtime PRN Insomnia        T(C): , Max: 36.5 (12-11-19 @ 12:44)  T(F): , Max: 97.7 (12-11-19 @ 12:44)  HR: 110 (12-12-19 @ 06:11)  BP: 92/63 (12-12-19 @ 06:11)  BP(mean): --  RR: 17 (12-11-19 @ 20:49)  SpO2: 98% (12-12-19 @ 05:40)  Wt(kg): --    12-11 @ 07:01  -  12-12 @ 07:00  --------------------------------------------------------  IN: 0 mL / OUT: 450 mL / NET: -450 mL      PHYSICAL EXAM:    Constitutional: NAD, frail  HEENT: temp wasting  Neck: No LAD, No JVD  Respiratory: dist BS  Cardiovascular: S1 and S2, RRR  Gastrointestinal: soft  Extremities: No peripheral edema  Neurological: Asleep but arousable  : No Herbert  Skin: No rashes  Access: Not applicable        LABS:                        9.7    18.64 )-----------( 76       ( 11 Dec 2019 07:20 )             33.0     11 Dec 2019 07:20    144    |  120    |  72     ----------------------------<  93     4.3     |  16     |  2.26   09 Dec 2019 22:39    142    |  117    |  82     ----------------------------<  100    4.7     |  17     |  2.77   09 Dec 2019 12:27    141    |  115    |  94     ----------------------------<  104    5.5     |  17     |  3.17     Ca    7.0        11 Dec 2019 07:20  Ca    7.2        09 Dec 2019 22:39  Ca    7.4        09 Dec 2019 12:27    TPro  5.3    /  Alb  2.2    /  TBili  0.5    /  DBili  x      /  AST  15     /  ALT  17     /  AlkPhos  101    09 Dec 2019 12:27          Urine Studies:          RADIOLOGY & ADDITIONAL STUDIES:

## 2019-12-12 NOTE — PROGRESS NOTE ADULT - ASSESSMENT
84 MM, HTN, HLD, arrythmia, CKD III presenting to the ED via EMS form [facility] c/o SOB and  weakness, decreased appetitive with renal evaluation of DAMON.     DAMON  -Suspect pre-renal with poor intake and diuretic use, stabilzing with hydration  -Maintain IVF x 20 hrs as ordered on 12/11, can likely maintain off when complete (LR)   -Renal imaging if function does not continue to normalize  -Maintain MAP, optimize intake    CKD III  -baseline near 1.5 mg/dl  -NSAID avoidance    FTT/Poor intake  -Dietary optimization as able, Dr Isabel noted re malignancy    d/c with RN staff

## 2019-12-12 NOTE — CHART NOTE - NSCHARTNOTEFT_GEN_A_CORE
Discussed with hospitalist; patient had ct chest; found to have large pleural effusions and ascites; will expand antibiotic coverage to meropenem avoiding zosyn with given salt load

## 2019-12-12 NOTE — PROGRESS NOTE ADULT - SUBJECTIVE AND OBJECTIVE BOX
Patient is a 85 y/o F with PMHx of HTN, HLD, arrythmia, CKD II presenting to the ED via EMS form [facility] c/o SOB and  weakness, decreased appetitite. Per EMS, pt reported that she fell out of bed twice within the past 2 days, but did not fall today. Patient is a poor historian. Admitted for pneumonia and UTI- started on IV antibiotics.     12/11:  patient was seen and examined. Tearful- stated that she feels scared. Denies chest pain, fever or chills. poor appetite.     REVIEW OF SYSTEMS:  CONSTITUTIONAL: No weakness, fevers or chills  EYES/ENT: No visual changes;  No vertigo or throat pain   NECK: No pain or stiffness  RESPIRATORY: No cough, wheezing, hemoptysis; No shortness of breath  CARDIOVASCULAR: No chest pain or palpitations  GASTROINTESTINAL: No abdominal or epigastric pain. No nausea, vomiting, or hematemesis; No diarrhea or constipation. No melena or hematochezia.  GENITOURINARY: No dysuria, frequency or hematuria  NEUROLOGICAL: No numbness or weakness  SKIN: No itching, burning, rashes, or lesions   All other review of systems is negative unless indicated above.    PE:  Constitutional: NAD, laying in bed  HEENT: NC/AT  Back: no tenderness  Respiratory: respirations even and non labored, coarse breath sounds throughout lung fields.   Cardiovascular: S1S2 regular, no murmurs  Abdomen: soft, not tender, not distended, positive BS  Genitourinary: voiding  Rectal: deferred  Musculoskeletal: no muscle tenderness, no joint swelling or tenderness               CBC Full  -  ( 11 Dec 2019 07:20 )  WBC Count : 18.64 K/uL  RBC Count : 3.27 M/uL  Hemoglobin : 9.7 g/dL  Hematocrit : 33.0 %  Platelet Count - Automated : 76 K/uL    144  |  120<H>  |  72<H>  ----------------------------<  93  4.3   |  16<L>  |  2.26<H>    Ca    7.0<L>      11 Dec 2019 07:20       PROCEDURE DATE:  12/09/2019          INTERPRETATION:  Short of breath.    AP chest. Prior 6/17/2019.    Chin obscures portion right apex. Low lung volumes. No change heart   mediastinum. Right chestport reidentified in situ. There is new focal   consolidation the right lower lobe likely representing pneumonia and   atelectasis in the appropriate clinical setting. New small right pleural   effusion.    Impression: Right lower lobe consolidation and small right parapneumonic   effusion    * Sepsis in the setting of Pneumonia / ? ? ? parapneumonic effusion vs empyema  * elevated WBC most likely infectious in origin, but differential could be a dz process driving WBC elevation  * Failure to thrive  * UTI  * DAMON on CKD stage 2- suspect prerenal with poor PO intake  * leukocytosis / anemia /  thrombocytopenia  - Cxray showed right lower lobe consolidation and small right parapneumonic effusion  - day # 3 ceftriaxone / doxycycline -> will do broader coverage for meropenem  - tolerating antibiotics without rashes or side effects   - Monitor closely in view of history of penicillin allergy   - supplemental O2 as needed  - nephrology consult for DAMON  - monitor creatinine   - hold spironolactone for now  - renally dose meds  - palliative care evaluation for GOC meeting   - care discussed with Dr. Escobar.   - Care discussed with Dr. Isabel, family knows and patient that patient has a metastatic dz. Patient has received chemo to decrease symptom burder.       # Smoldering myeloma  - heme following    # Primary peritoneal carcinoma metastatic  # ? Spleenic infarct  - right sided chest port  - heme following

## 2019-12-12 NOTE — CONSULT NOTE ADULT - PROBLEM SELECTOR RECOMMENDATION 9
Mrs. Wallace had ~ 9 hours of AF associated with RVR on 12/11-12/12 and was asymptomatic; currently in sinus rhythm.  She is at elevated risk of thromboembolism but also with increased risk of bleeding (elderly, frail, fall prior to admission, renal disease, thrombocytopenia).  Anticoagulation may decrease risk of CVA/TIA/embolism -- need additional information from family about past arrhythmia diagnosis (history suggests that AF may have occurred in past; not anticoagulated at time of admission); tried to reach daughter but did not answer phone.  I recommend continued tele monitoring; AF likely to recur.  Continue metoprolol; consider changing to metoprolol tartrate given intermittent low BP.

## 2019-12-12 NOTE — PROGRESS NOTE ADULT - ASSESSMENT
Pt is an 83 y/o F with PMHx of HTN, HLD, arrythmia, CKD II admitted from assisted living on 12/9 for evaluation of shortness of breath and weakness; patient is unclear as to why she is in hospital; history per medical record as patient is poor historian. She gives history of penicillin allergy over 50 years ago and she does not recall her reaction.     1. Patient admitted with pneumonia which will treat as community acquired pneumonia; also noted with leukocytosis most likely reactive to infection  - follow up cultures   - oxygen and nebs as needed   - serial cbc and monitor temperature   - reviewed prior medical records to evaluate for resistant or atypical pathogens   - iv hydration and supportive care   - day #3 ceftriaxone and doxycycline  - tolerating antibiotics without rashes or side effects   - Monitor closely in view of history of penicillin allergy   2. other issues: per medicine

## 2019-12-12 NOTE — CONSULT NOTE ADULT - SUBJECTIVE AND OBJECTIVE BOX
CHIEF COMPLAINT: "I don't know how I feel - I just want to go home."    HPI:  85 y/o woman with a history of HTN, HLD, "arrythmia" (details not known), CKD, GERD, who presented to the ER from her assisted living facility on 12/9/19 for the evaluation of shortness of breath, abdominal pain, recent fall, generalized weakness -- subsequently diagnosed with pneumonia associated with pleural effusions, leukocytosis.  Patient is a poor informant and unable to provide any details of her illness.  She says, "I don't know how I feel" and "they say I have pneumonia."  Unable to identify exacerbating or alleviating factors.  Cardiology consult requested due to an episode of AF starting on 12/11/19.  Mrs. Wallace says she had an "irregular heart" but it "went away."  She denies experiencing palpitations and angina.    PAST MEDICAL & SURGICAL HISTORY:  Chronic kidney disease  GERD (gastroesophageal reflux disease)  Urinary incontinence  Diarrhea  Osteoporosis  Obesity  Hyperlipidemia  Hypertension  Abdominal pain  Anxiety  Anemia  Myeloma  Nephrolithiasis  S/P cataract surgery: b/l  S/P colonoscopy  S/P tonsillectomy  Encounter for cholecystectomy  Abnormal biopsy result: abdomen May 2019  H/O total hip arthroplasty, bilateral  History of bilateral knee arthroplasty  H/O total hysterectomy  History of hernia repair    SOCIAL HISTORY:   Alcohol: Denied  Smoking: Nonsmoker    FAMILY HISTORY: Details not known to patient    MEDICATIONS  (STANDING):  aspirin enteric coated 325 milliGRAM(s) Oral daily  atorvastatin 10 milliGRAM(s) Oral at bedtime  clonazePAM  Tablet 1 milliGRAM(s) Oral two times a day  clonazePAM  Tablet 0.5 milliGRAM(s) Oral daily  doxycycline hyclate Capsule 100 milliGRAM(s) Oral every 12 hours  ferrous    sulfate 325 milliGRAM(s) Oral two times a day  folic acid 1 milliGRAM(s) Oral daily  heparin  Injectable 5000 Unit(s) SubCutaneous every 12 hours  lactated ringers. 1000 milliLiter(s) (70 mL/Hr) IV Continuous <Continuous>  melatonin 3 milliGRAM(s) Oral at bedtime  meropenem  IVPB 500 milliGRAM(s) IV Intermittent every 12 hours  metoprolol succinate ER 50 milliGRAM(s) Oral daily  mirtazapine 7.5 milliGRAM(s) Oral at bedtime  oxyCODONE    IR 5 milliGRAM(s) Oral two times a day  pantoprazole    Tablet 40 milliGRAM(s) Oral before breakfast  sertraline 100 milliGRAM(s) Oral daily    MEDICATIONS  (PRN):  acetaminophen   Tablet .. 1000 milliGRAM(s) Oral every 12 hours PRN Moderate Pain (4 - 6)  zolpidem 5 milliGRAM(s) Oral at bedtime PRN Insomnia  zolpidem 5 milliGRAM(s) Oral at bedtime PRN Insomnia    Allergies:    Erythromycin Base (Other)  penicillin (Rash)    REVIEW OF SYSTEMS:  * Patient unable to participate in full ROS -- keeps asking to be repositioned.  CONSTITUTIONAL: + weakness  RESPIRATORY: +  shortness of breath  CARDIOVASCULAR: No chest pain or palpitations    VITAL SIGNS:   Vital Signs Last 24 Hrs  T(C): 36.3 (12 Dec 2019 12:50), Max: 36.5 (11 Dec 2019 20:49)  T(F): 97.3 (12 Dec 2019 12:50), Max: 97.7 (11 Dec 2019 20:49)  HR: 91 (12 Dec 2019 12:50) (91 - 115)  BP: 97/60 (12 Dec 2019 12:50) (89/58 - 99/81)  RR: 17 (12 Dec 2019 12:50) (17 - 17)  SpO2: 93% (12 Dec 2019 12:50) (93% - 98%)    PHYSICAL EXAM:  Constitutional: NAD, awake, appears stated age, no distress, semirecumbent in bed  HEENT:  No oral cyanosis.  Pulmonary: Non-labored, decreased breath sounds lower fields  Cardiovascular: Regular, normal S1 and S2  Gastrointestinal: Bowel Sounds present, abdomen is distended, soft, nontender.   Lymph: No peripheral edema. No cervical lymphadenopathy.  Skin: No rashes.  Psych:  Mood & affect appropriate    LABS:                     9.7    18.64 )-----------( 76       ( 11 Dec 2019 07:20 )             33.0           147    |  121    |  70     ----------------------------<  110    4.4     |  17     |  2.12     12 Lead ECG (12.11.19 @ 19:19):  Atrial fibrillation with rapid ventricular response, Nonspecific T wave abnormality. When compared with ECG of 09-DEC-2019 11:21,Atrial fibrillation has replaced Sinus rhythm    Tele:  Sinus rhythm --> AF  --> Sinus rhythm    CT Chest No Cont (12.12.19 @ 10:29):  Moderate bilateral pleural effusions.  Large amount of ascites.  Bilateral lower lobe opacities compatible with atelectasis. Superimposed pneumonia not excluded. Low-density region in the spleen suggestive of infarction.

## 2019-12-12 NOTE — CONSULT NOTE ADULT - SUBJECTIVE AND OBJECTIVE BOX
HPI:    84 y.o.f with PMHx of HTN, HLD, arrythmia, CKD II admitted with c/o SOB and  weakness, decreased appetitite. pat fell out of bed twice within the past 2 days, she is a poor historian.  She denies cp, URI or UTI symptoms.  Pt uses walker and wheelchair at baseline. pat lying in bed, asked to see for pulmonary evaluation.    Fam HX:   unavailable (09 Dec 2019 14:51)      PAST MEDICAL & SURGICAL HISTORY:  Chronic kidney disease  GERD (gastroesophageal reflux disease)  Urinary incontinence  Diarrhea  Osteoporosis  Obesity  Hyperlipidemia  Hypertension  Abdominal pain  Anxiety  Anemia  Myeloma  Nephrolithiasis  S/P cataract surgery: b/l  S/P colonoscopy  S/P tonsillectomy  Encounter for cholecystectomy  Abnormal biopsy result: abdomen May 2019  H/O total hip arthroplasty, bilateral  History of bilateral knee arthroplasty  H/O total hysterectomy  History of hernia repair      Home Medications:  aspirin 325 mg oral delayed release tablet: 1 tab(s) orally once a day (09 Dec 2019 18:37)  atorvastatin 10 mg oral tablet: 1 tab(s) orally once a day (09 Dec 2019 18:37)  Boniva 150 mg oral tablet: 1 tab(s) orally once a month on the 3rd day (09 Dec 2019 18:37)  Caltrate 600 + D oral tablet: 1 tab(s) orally 2 times a day (09 Dec 2019 18:37)  clonazePAM 1 mg oral tablet: 1 tab(s) orally 2 times a day at 6 am and 10pm (09 Dec 2019 18:37)  clonazePAM 1 mg oral tablet: 0.5 tab(s) orally once a day at 2pm (hold for sedation) (09 Dec 2019 18:37)  ferrous sulfate 325 mg (65 mg elemental iron) oral tablet: 1 tab(s) orally 3 times a day (09 Dec 2019 18:37)  folic acid 1 mg oral tablet: 1 tab(s) orally once a day (09 Dec 2019 18:37)  loperamide 2 mg oral capsule: 1 cap(s) orally every 8 hours, As Needed - for diarrhea (09 Dec 2019 18:37)  Melatonin 3 mg oral tablet: 2 tab(s) orally once a day (at bedtime) (09 Dec 2019 18:37)  Metoprolol Succinate  mg oral tablet, extended release: 1 tab(s) orally once a day (09 Dec 2019 18:37)  mirtazapine 7.5 mg oral tablet: 1 tab(s) orally once a day (at bedtime) (09 Dec 2019 18:37)  Multiple Vitamins oral tablet: 1 tab(s) orally once a day (09 Dec 2019 18:37)  oxyCODONE 5 mg oral tablet: 1 tab(s) orally 2 times a day at 8am and 10pm (09 Dec 2019 18:37)  pantoprazole 40 mg oral delayed release tablet: 1 tab(s) orally once a day (09 Dec 2019 18:37)  potassium citrate 10 mEq oral tablet, extended release: 2 tab(s) orally once a day (09 Dec 2019 18:37)  sertraline 50 mg oral tablet: 3 tab(s) orally once a day (09 Dec 2019 18:37)  spironolactone 25 mg oral tablet: 1 tab(s) orally every other day (09 Dec 2019 18:37)  Tylenol Extra Strength 500 mg oral tablet: 2 tab(s) orally once a day, As Needed - for moderate pain (09 Dec 2019 18:37)  Tylenol Extra Strength 500 mg oral tablet: 2 tab(s) orally 2 times a day at 7am and 1pm (09 Dec 2019 18:37)  Vitamin B-12 1000 mcg oral tablet: 1 tab(s) orally once a day (09 Dec 2019 18:37)  Vitamin C 1000 mg oral tablet: 1 tab(s) orally once a day (09 Dec 2019 18:37)  Vitamin D3 1000 intl units oral capsule: 1 cap(s) orally once a day (09 Dec 2019 18:37)  zolpidem 6.25 mg oral tablet, extended release: 1 tab(s) orally once a day (at bedtime) (09 Dec 2019 18:37)      MEDICATIONS  (STANDING):  aspirin enteric coated 325 milliGRAM(s) Oral daily  atorvastatin 10 milliGRAM(s) Oral at bedtime  cefTRIAXone Injectable. 1000 milliGRAM(s) IV Push every 24 hours  clonazePAM  Tablet 1 milliGRAM(s) Oral two times a day  clonazePAM  Tablet 0.5 milliGRAM(s) Oral daily  doxycycline hyclate Capsule 100 milliGRAM(s) Oral every 12 hours  ferrous    sulfate 325 milliGRAM(s) Oral two times a day  folic acid 1 milliGRAM(s) Oral daily  heparin  Injectable 5000 Unit(s) SubCutaneous every 12 hours  lactated ringers. 1000 milliLiter(s) (70 mL/Hr) IV Continuous <Continuous>  melatonin 3 milliGRAM(s) Oral at bedtime  metoprolol succinate ER 50 milliGRAM(s) Oral daily  mirtazapine 7.5 milliGRAM(s) Oral at bedtime  oxyCODONE    IR 5 milliGRAM(s) Oral two times a day  pantoprazole    Tablet 40 milliGRAM(s) Oral before breakfast  sertraline 100 milliGRAM(s) Oral daily    MEDICATIONS  (PRN):  acetaminophen   Tablet .. 1000 milliGRAM(s) Oral every 12 hours PRN Moderate Pain (4 - 6)  zolpidem 5 milliGRAM(s) Oral at bedtime PRN Insomnia  zolpidem 5 milliGRAM(s) Oral at bedtime PRN Insomnia      Allergies    Erythromycin Base (Other)  penicillin (Rash)    Intolerances        SOCIAL HISTORY: Denies tobacco, etoh abuse or illicit drug use    FAMILY HISTORY:      Vital Signs Last 24 Hrs  T(C): 36.3 (12 Dec 2019 05:40), Max: 36.5 (11 Dec 2019 12:44)  T(F): 97.3 (12 Dec 2019 05:40), Max: 97.7 (11 Dec 2019 12:44)  HR: 110 (12 Dec 2019 06:11) (83 - 115)  BP: 92/63 (12 Dec 2019 06:11) (89/58 - 99/81)  BP(mean): --  RR: 17 (11 Dec 2019 20:49) (17 - 18)  SpO2: 98% (12 Dec 2019 05:40) (95% - 98%)        REVIEW OF SYSTEMS:    CONSTITUTIONAL:  As per HPI. poor historian    PHYSICAL EXAMINATION:    GENERAL APPEARANCE:  Pt. is not currently dyspneic, in no distress. Pt. is alert, oriented, and pleasant.  HEENT:  Pupils are normal and react normally. No icterus. Mucous membranes well colored.  NECK:  Supple. No lymphadenopathy. Jugular venous pressure not elevated. Carotids equal.   HEART:   The cardiac impulse has a normal quality. Regular. Normal S1 and S2. There are no murmurs, rubs or gallops noted  CHEST:  Chest crackles to auscultation. Normal respiratory effort.  ABDOMEN:  Soft and nontender.   EXTREMITIES:  There is no cyanosis, clubbing or edema.   SKIN:  No rash or significant lesions are noted.    LABS:      Culture - Urine (12.09.19 @ 12:51)    -  Amikacin: S <=16    -  Ampicillin: R >16 These ampicillin results predict results for amoxicillin    -  Ampicillin/Sulbactam: I 16/8 Enterobacter, Citrobacter, and Serratia may develop resistance during prolonged therapy (3-4 days)    -  Aztreonam: S <=4    -  Cefazolin: R >16 (MIC_CL_COM_ENTERIC_CEFAZU) For uncomplicated UTI with K. pneumoniae, E. coli, or P. mirablis: CORBY <=16 is sensitive and CORBY >=32 is resistant. This also predicts results for oral agents cefaclor, cefdinir, cefpodoxime, cefprozil, cefuroxime axetil, cephalexin and locarbef for uncomplicated UTI. Note that some isolates may be susceptible to these agents while testing resistant to cefazolin.    -  Cefepime: S <=4    -  Cefoxitin: R >16    -  Ceftriaxone: S <=1 Enterobacter, Citrobacter, and Serratia may develop resistance during prolonged therapy    -  Ciprofloxacin: R >2    -  Gentamicin: R >8    -  Imipenem: S <=1    -  Levofloxacin: R >4    -  Meropenem: S <=1    -  Nitrofurantoin: R >64 Should not be used to treat pyelonephritis    -  Piperacillin/Tazobactam: S <=16    -  Tigecycline: S <=2    -  Tobramycin: I 8    -  Trimethoprim/Sulfamethoxazole: R >2/38    Specimen Source: .Urine None    Culture Results:   >100,000 CFU/ml Escherichia coli    Organism Identification: Escherichia coli    Organism: Escherichia coli    Method Type: CORBY    Legionella pneumophila Antigen, Urine (12.10.19 @ 01:20)    Legionella Antigen, Urine: Negative                        9.7    18.64 )-----------( 76       ( 11 Dec 2019 07:20 )             33.0     12-11    144  |  120<H>  |  72<H>  ----------------------------<  93  4.3   |  16<L>  |  2.26<H>    Ca    7.0<L>      11 Dec 2019 07:20    RADIOLOGY & ADDITIONAL STUDIES:     Chest 1 View AP/PA. (12.09.19 @ 11:45) >  Impression: Right lower lobe consolidation and small right parapneumonic   effusion

## 2019-12-12 NOTE — PROGRESS NOTE ADULT - ATTENDING COMMENTS
Patient seen and examined with NP Latonya Murcia.  I personally had a face-to-face encounter with the patient, examined the patient myself and reviewed the plan of care with the patient and NP.  I agree with the assessment and plan of NP as stated and discussed.    Patient states she feels better than yesterday, seems anxious about being in hospital. Explained to patient she has a UTi and likely PNA and needs IV abx.    Physical Exam:  General: Well developed, well nourished, NAD  Neurology: nonfocal  Respiratory: dec breath sounds B/L bases  CV: RRR, +S1/S2  Abdominal: Soft, NT, ND +BSx4  Extremities: No C/C/E, + peripheral pulses  Skin: warm, dry    *Sepsis due to CAP will treat for gram negative rods and other resistant bacteria.  *UTI  *DAMON on CKD stage 2- suspect prerenal with poor PO intake    Continue IV abx, gentle IV fluids. Monitor CBC and BMP in AM.

## 2019-12-12 NOTE — PROGRESS NOTE ADULT - SUBJECTIVE AND OBJECTIVE BOX
Date of service: 12-12-19 @ 11:58    Patient lying in bed; lethargic but arousable        ROS: unable to obtain secondary to patient medical condition     MEDICATIONS  (STANDING):  aspirin enteric coated 325 milliGRAM(s) Oral daily  atorvastatin 10 milliGRAM(s) Oral at bedtime  cefTRIAXone Injectable. 1000 milliGRAM(s) IV Push every 24 hours  clonazePAM  Tablet 1 milliGRAM(s) Oral two times a day  clonazePAM  Tablet 0.5 milliGRAM(s) Oral daily  doxycycline hyclate Capsule 100 milliGRAM(s) Oral every 12 hours  ferrous    sulfate 325 milliGRAM(s) Oral two times a day  folic acid 1 milliGRAM(s) Oral daily  heparin  Injectable 5000 Unit(s) SubCutaneous every 12 hours  lactated ringers. 1000 milliLiter(s) (70 mL/Hr) IV Continuous <Continuous>  melatonin 3 milliGRAM(s) Oral at bedtime  metoprolol succinate ER 50 milliGRAM(s) Oral daily  mirtazapine 7.5 milliGRAM(s) Oral at bedtime  oxyCODONE    IR 5 milliGRAM(s) Oral two times a day  pantoprazole    Tablet 40 milliGRAM(s) Oral before breakfast  sertraline 100 milliGRAM(s) Oral daily    MEDICATIONS  (PRN):  acetaminophen   Tablet .. 1000 milliGRAM(s) Oral every 12 hours PRN Moderate Pain (4 - 6)  zolpidem 5 milliGRAM(s) Oral at bedtime PRN Insomnia  zolpidem 5 milliGRAM(s) Oral at bedtime PRN Insomnia      Vital Signs Last 24 Hrs  T(C): 36.3 (12 Dec 2019 05:40), Max: 36.5 (11 Dec 2019 12:44)  T(F): 97.3 (12 Dec 2019 05:40), Max: 97.7 (11 Dec 2019 12:44)  HR: 110 (12 Dec 2019 06:11) (83 - 115)  BP: 92/63 (12 Dec 2019 06:11) (89/58 - 99/81)  BP(mean): --  RR: 17 (11 Dec 2019 20:49) (17 - 18)  SpO2: 98% (12 Dec 2019 05:40) (95% - 98%)    Physical Exam:      PE:    Constitutional: frail looking  HEENT: NC/AT, EOMI, PERRLA, conjunctivae clear; ears and nose atraumatic; pharynx clear  Neck: supple; thyroid not palpable  Back: no tenderness  Respiratory: respiratory effort normal; scattered coarse breath sounds  Cardiovascular: S1S2 regular, no murmurs  Abdomen: soft, not tender, not distended, positive BS; no liver or spleen organomegaly  Genitourinary: no suprapubic tenderness  Musculoskeletal: no muscle tenderness, no joint swelling or tenderness  Neurological/ Psychiatric:   moving all extremities  Skin: no rashes; no palpable lesions    Labs: all available labs reviewed                 Labs:                        9.7    18.64 )-----------( 76       ( 11 Dec 2019 07:20 )             33.0     12-12    147<H>  |  121<H>  |  70<H>  ----------------------------<  110<H>  4.4   |  17<L>  |  2.12<H>    Ca    7.6<L>      12 Dec 2019 07:52             Cultures:       Culture - Blood (collected 12-09-19 @ 13:47)  Source: .Blood None  Preliminary Report (12-10-19 @ 18:01):    No growth to date.    Culture - Blood (collected 12-09-19 @ 13:38)  Source: .Blood None  Preliminary Report (12-10-19 @ 18:01):    No growth to date.    Culture - Urine (collected 12-09-19 @ 12:51)  Source: .Urine None  Final Report (12-11-19 @ 17:50):    >100,000 CFU/ml Escherichia coli  Organism: Escherichia coli (12-11-19 @ 17:50)  Organism: Escherichia coli (12-11-19 @ 17:50)      -  Amikacin: S <=16      -  Ampicillin: R >16 These ampicillin results predict results for amoxicillin      -  Ampicillin/Sulbactam: I 16/8 Enterobacter, Citrobacter, and Serratia may develop resistance during prolonged therapy (3-4 days)      -  Aztreonam: S <=4      -  Cefazolin: R >16 (MIC_CL_COM_ENTERIC_CEFAZU) For uncomplicated UTI with K. pneumoniae, E. coli, or P. mirablis: CORBY <=16 is sensitive and CORBY >=32 is resistant. This also predicts results for oral agents cefaclor, cefdinir, cefpodoxime, cefprozil, cefuroxime axetil, cephalexin and locarbef for uncomplicated UTI. Note that some isolates may be susceptible to these agents while testing resistant to cefazolin.      -  Cefepime: S <=4      -  Cefoxitin: R >16      -  Ceftriaxone: S <=1 Enterobacter, Citrobacter, and Serratia may develop resistance during prolonged therapy      -  Ciprofloxacin: R >2      -  Gentamicin: R >8      -  Imipenem: S <=1      -  Levofloxacin: R >4      -  Meropenem: S <=1      -  Nitrofurantoin: R >64 Should not be used to treat pyelonephritis      -  Piperacillin/Tazobactam: S <=16      -  Tigecycline: S <=2      -  Tobramycin: I 8      -  Trimethoprim/Sulfamethoxazole: R >2/38      Method Type: CORBY            < from: Xray Chest 1 View AP/PA. (12.09.19 @ 11:45) >    EXAM:  XR CHEST 1 VIEW                            PROCEDURE DATE:  12/09/2019          INTERPRETATION:  Short of breath.    AP chest. Prior 6/17/2019.    Chin obscures portion right apex. Low lung volumes. No change heart   mediastinum. Right chestport reidentified in situ. There is new focal   consolidation the right lower lobe likely representing pneumonia and   atelectasis in the appropriate clinical setting. New small right pleural   effusion.    Impression: Right lower lobe consolidation and small right parapneumonic   effusion      < end of copied text >          Radiology: all available radiological tests reviewed    Advanced directives addressed: full resuscitation

## 2019-12-12 NOTE — CONSULT NOTE ADULT - PROBLEM/RECOMMENDATION-2
Pt more awake and cooperative. Allowed me to use a pen light to closely assess her tongue which was bitten at the time of the seizure. No significant laceration, only a small amount of bleeding at this time. Patient has no signs or symptoms of acute distress at this time, remains on continuous telemetry and pulse ox monitoring. Will continue to monitor.      Mello Roman RN  04/24/19 [id number] DISPLAY PLAN FREE TEXT

## 2019-12-13 ENCOUNTER — RESULT REVIEW (OUTPATIENT)
Age: 84
End: 2019-12-13

## 2019-12-13 LAB
ALBUMIN FLD-MCNC: 1.2 G/DL — SIGNIFICANT CHANGE UP
ANION GAP SERPL CALC-SCNC: 8 MMOL/L — SIGNIFICANT CHANGE UP (ref 5–17)
B PERT IGG+IGM PNL SER: CLEAR — SIGNIFICANT CHANGE UP
BUN SERPL-MCNC: 70 MG/DL — HIGH (ref 7–23)
CALCIUM SERPL-MCNC: 8.1 MG/DL — LOW (ref 8.5–10.1)
CHLORIDE SERPL-SCNC: 122 MMOL/L — HIGH (ref 96–108)
CO2 SERPL-SCNC: 15 MMOL/L — LOW (ref 22–31)
COLOR FLD: YELLOW — SIGNIFICANT CHANGE UP
CREAT SERPL-MCNC: 2.16 MG/DL — HIGH (ref 0.5–1.3)
EOSINOPHIL # FLD: 0 % — SIGNIFICANT CHANGE UP
FLUID INTAKE SUBSTANCE CLASS: SIGNIFICANT CHANGE UP
FLUID SEGMENTED GRANULOCYTES: 26 % — SIGNIFICANT CHANGE UP
FOLATE+VIT B12 SERBLD-IMP: 0 % — SIGNIFICANT CHANGE UP
GLUCOSE FLD-MCNC: 110 MG/DL — SIGNIFICANT CHANGE UP
GLUCOSE SERPL-MCNC: 110 MG/DL — HIGH (ref 70–99)
GRAM STN FLD: SIGNIFICANT CHANGE UP
HCT VFR BLD CALC: 33.8 % — LOW (ref 34.5–45)
HGB BLD-MCNC: 9.7 G/DL — LOW (ref 11.5–15.5)
INR BLD: 1.78 RATIO — HIGH (ref 0.88–1.16)
LDH SERPL L TO P-CCNC: 207 U/L — SIGNIFICANT CHANGE UP
LYMPHOCYTES # FLD: 20 % — SIGNIFICANT CHANGE UP
MCHC RBC-ENTMCNC: 28.7 GM/DL — LOW (ref 32–36)
MCHC RBC-ENTMCNC: 29 PG — SIGNIFICANT CHANGE UP (ref 27–34)
MCV RBC AUTO: 101.2 FL — HIGH (ref 80–100)
MESOTHL CELL # FLD: 5 % — SIGNIFICANT CHANGE UP
MONOS+MACROS # FLD: 49 % — SIGNIFICANT CHANGE UP
NIGHT BLUE STAIN TISS: SIGNIFICANT CHANGE UP
NRBC # FLD: 0 — SIGNIFICANT CHANGE UP
OTHER CELLS FLD MANUAL: 0 % — SIGNIFICANT CHANGE UP
PH FLD: 7.32 — SIGNIFICANT CHANGE UP
PLATELET # BLD AUTO: 79 K/UL — LOW (ref 150–400)
POTASSIUM SERPL-MCNC: 4.3 MMOL/L — SIGNIFICANT CHANGE UP (ref 3.5–5.3)
POTASSIUM SERPL-SCNC: 4.3 MMOL/L — SIGNIFICANT CHANGE UP (ref 3.5–5.3)
PROT FLD-MCNC: 2.1 G/DL — SIGNIFICANT CHANGE UP
PROTHROM AB SERPL-ACNC: 20.1 SEC — HIGH (ref 10–12.9)
RBC # BLD: 3.34 M/UL — LOW (ref 3.8–5.2)
RBC # FLD: 18 % — HIGH (ref 10.3–14.5)
RCV VOL RI: 3000 /UL — HIGH (ref 0–0)
SODIUM SERPL-SCNC: 145 MMOL/L — SIGNIFICANT CHANGE UP (ref 135–145)
SPECIMEN SOURCE FLD: SIGNIFICANT CHANGE UP
SPECIMEN SOURCE: SIGNIFICANT CHANGE UP
SPECIMEN SOURCE: SIGNIFICANT CHANGE UP
TOTAL NUCLEATED CELL COUNT, BODY FLUID: 452 /UL — SIGNIFICANT CHANGE UP
TUBE TYPE: SIGNIFICANT CHANGE UP
WBC # BLD: 24.54 K/UL — HIGH (ref 3.8–10.5)
WBC # FLD AUTO: 24.54 K/UL — HIGH (ref 3.8–10.5)

## 2019-12-13 PROCEDURE — 99232 SBSQ HOSP IP/OBS MODERATE 35: CPT

## 2019-12-13 PROCEDURE — 71045 X-RAY EXAM CHEST 1 VIEW: CPT | Mod: 26

## 2019-12-13 PROCEDURE — 99221 1ST HOSP IP/OBS SF/LOW 40: CPT | Mod: 57

## 2019-12-13 PROCEDURE — 88108 CYTOPATH CONCENTRATE TECH: CPT | Mod: 26

## 2019-12-13 PROCEDURE — 88305 TISSUE EXAM BY PATHOLOGIST: CPT | Mod: 26

## 2019-12-13 RX ORDER — HEPARIN SODIUM 5000 [USP'U]/ML
4500 INJECTION INTRAVENOUS; SUBCUTANEOUS EVERY 6 HOURS
Refills: 0 | Status: DISCONTINUED | OUTPATIENT
Start: 2019-12-13 | End: 2019-12-13

## 2019-12-13 RX ORDER — HEPARIN SODIUM 5000 [USP'U]/ML
2000 INJECTION INTRAVENOUS; SUBCUTANEOUS EVERY 6 HOURS
Refills: 0 | Status: DISCONTINUED | OUTPATIENT
Start: 2019-12-13 | End: 2019-12-13

## 2019-12-13 RX ORDER — HEPARIN SODIUM 5000 [USP'U]/ML
4500 INJECTION INTRAVENOUS; SUBCUTANEOUS EVERY 6 HOURS
Refills: 0 | Status: DISCONTINUED | OUTPATIENT
Start: 2019-12-13 | End: 2019-12-14

## 2019-12-13 RX ORDER — HEPARIN SODIUM 5000 [USP'U]/ML
INJECTION INTRAVENOUS; SUBCUTANEOUS
Qty: 25000 | Refills: 0 | Status: DISCONTINUED | OUTPATIENT
Start: 2019-12-13 | End: 2019-12-14

## 2019-12-13 RX ORDER — WARFARIN SODIUM 2.5 MG/1
3 TABLET ORAL ONCE
Refills: 0 | Status: COMPLETED | OUTPATIENT
Start: 2019-12-13 | End: 2019-12-13

## 2019-12-13 RX ORDER — HEPARIN SODIUM 5000 [USP'U]/ML
2000 INJECTION INTRAVENOUS; SUBCUTANEOUS EVERY 6 HOURS
Refills: 0 | Status: DISCONTINUED | OUTPATIENT
Start: 2019-12-13 | End: 2019-12-14

## 2019-12-13 RX ORDER — HEPARIN SODIUM 5000 [USP'U]/ML
4500 INJECTION INTRAVENOUS; SUBCUTANEOUS ONCE
Refills: 0 | Status: DISCONTINUED | OUTPATIENT
Start: 2019-12-13 | End: 2019-12-13

## 2019-12-13 RX ORDER — HEPARIN SODIUM 5000 [USP'U]/ML
INJECTION INTRAVENOUS; SUBCUTANEOUS
Qty: 25000 | Refills: 0 | Status: DISCONTINUED | OUTPATIENT
Start: 2019-12-13 | End: 2019-12-13

## 2019-12-13 RX ADMIN — ZOLPIDEM TARTRATE 5 MILLIGRAM(S): 10 TABLET ORAL at 22:08

## 2019-12-13 RX ADMIN — HEPARIN SODIUM 1100 UNIT(S)/HR: 5000 INJECTION INTRAVENOUS; SUBCUTANEOUS at 19:48

## 2019-12-13 RX ADMIN — Medication 100 MILLIGRAM(S): at 18:18

## 2019-12-13 RX ADMIN — Medication 1 MILLIGRAM(S): at 05:46

## 2019-12-13 RX ADMIN — SERTRALINE 100 MILLIGRAM(S): 25 TABLET, FILM COATED ORAL at 13:19

## 2019-12-13 RX ADMIN — HEPARIN SODIUM 5000 UNIT(S): 5000 INJECTION INTRAVENOUS; SUBCUTANEOUS at 05:47

## 2019-12-13 RX ADMIN — PANTOPRAZOLE SODIUM 40 MILLIGRAM(S): 20 TABLET, DELAYED RELEASE ORAL at 05:46

## 2019-12-13 RX ADMIN — Medication 25 MILLIGRAM(S): at 05:46

## 2019-12-13 RX ADMIN — Medication 100 MILLIGRAM(S): at 05:46

## 2019-12-13 RX ADMIN — Medication 325 MILLIGRAM(S): at 13:19

## 2019-12-13 RX ADMIN — Medication 3 MILLIGRAM(S): at 21:03

## 2019-12-13 RX ADMIN — Medication 1 MILLIGRAM(S): at 13:18

## 2019-12-13 RX ADMIN — ATORVASTATIN CALCIUM 10 MILLIGRAM(S): 80 TABLET, FILM COATED ORAL at 21:03

## 2019-12-13 RX ADMIN — Medication 1000 MILLIGRAM(S): at 14:25

## 2019-12-13 RX ADMIN — Medication 325 MILLIGRAM(S): at 18:18

## 2019-12-13 RX ADMIN — MEROPENEM 100 MILLIGRAM(S): 1 INJECTION INTRAVENOUS at 13:18

## 2019-12-13 RX ADMIN — Medication 0.5 MILLIGRAM(S): at 13:19

## 2019-12-13 RX ADMIN — OXYCODONE HYDROCHLORIDE 5 MILLIGRAM(S): 5 TABLET ORAL at 18:18

## 2019-12-13 RX ADMIN — Medication 325 MILLIGRAM(S): at 05:46

## 2019-12-13 RX ADMIN — Medication 1 MILLIGRAM(S): at 18:18

## 2019-12-13 RX ADMIN — MEROPENEM 100 MILLIGRAM(S): 1 INJECTION INTRAVENOUS at 21:03

## 2019-12-13 RX ADMIN — MIRTAZAPINE 7.5 MILLIGRAM(S): 45 TABLET, ORALLY DISINTEGRATING ORAL at 21:03

## 2019-12-13 RX ADMIN — OXYCODONE HYDROCHLORIDE 5 MILLIGRAM(S): 5 TABLET ORAL at 05:46

## 2019-12-13 RX ADMIN — WARFARIN SODIUM 3 MILLIGRAM(S): 2.5 TABLET ORAL at 21:03

## 2019-12-13 RX ADMIN — Medication 1000 MILLIGRAM(S): at 13:25

## 2019-12-13 RX ADMIN — HEPARIN SODIUM 5000 UNIT(S): 5000 INJECTION INTRAVENOUS; SUBCUTANEOUS at 18:18

## 2019-12-13 NOTE — PROGRESS NOTE ADULT - ASSESSMENT
84 MM, HTN, HLD, arrythmia, CKD III presenting to the ED via EMS form [facility] c/o SOB and  weakness, decreased appetitive with renal evaluation of DAMON.     DAMON  -Suspect pre-renal with poor intake and diuretic use, stabilzing with hydration  -If renal function continues to rise, resume hydration IV with poor intake  -Renal imaging ordered  -Maintain MAP, optimize intake    CKD III  -baseline near 1.5 mg/dl  -NSAID avoidance    FTT/Poor intake  -Dietary optimization as able, Dr Isabel noted re malignancy and palliative meeting pending    d/c with RN staff

## 2019-12-13 NOTE — CHART NOTE - NSCHARTNOTEFT_GEN_A_CORE
This SW spoke with pts son Yobany [phone] to schedule a family meeting. Pts son is available to meet on Monday at 3:30PM. Our team will continue to follow.

## 2019-12-13 NOTE — PROGRESS NOTE ADULT - SUBJECTIVE AND OBJECTIVE BOX
Subjective:    pat lying in bed, no new complaint.    Home Medications:  aspirin 325 mg oral delayed release tablet: 1 tab(s) orally once a day (09 Dec 2019 18:37)  atorvastatin 10 mg oral tablet: 1 tab(s) orally once a day (09 Dec 2019 18:37)  Boniva 150 mg oral tablet: 1 tab(s) orally once a month on the 3rd day (09 Dec 2019 18:37)  Caltrate 600 + D oral tablet: 1 tab(s) orally 2 times a day (09 Dec 2019 18:37)  clonazePAM 1 mg oral tablet: 1 tab(s) orally 2 times a day at 6 am and 10pm (09 Dec 2019 18:37)  clonazePAM 1 mg oral tablet: 0.5 tab(s) orally once a day at 2pm (hold for sedation) (09 Dec 2019 18:37)  ferrous sulfate 325 mg (65 mg elemental iron) oral tablet: 1 tab(s) orally 3 times a day (09 Dec 2019 18:37)  folic acid 1 mg oral tablet: 1 tab(s) orally once a day (09 Dec 2019 18:37)  loperamide 2 mg oral capsule: 1 cap(s) orally every 8 hours, As Needed - for diarrhea (09 Dec 2019 18:37)  Melatonin 3 mg oral tablet: 2 tab(s) orally once a day (at bedtime) (09 Dec 2019 18:37)  Metoprolol Succinate  mg oral tablet, extended release: 1 tab(s) orally once a day (09 Dec 2019 18:37)  mirtazapine 7.5 mg oral tablet: 1 tab(s) orally once a day (at bedtime) (09 Dec 2019 18:37)  Multiple Vitamins oral tablet: 1 tab(s) orally once a day (09 Dec 2019 18:37)  oxyCODONE 5 mg oral tablet: 1 tab(s) orally 2 times a day at 8am and 10pm (09 Dec 2019 18:37)  pantoprazole 40 mg oral delayed release tablet: 1 tab(s) orally once a day (09 Dec 2019 18:37)  potassium citrate 10 mEq oral tablet, extended release: 2 tab(s) orally once a day (09 Dec 2019 18:37)  sertraline 50 mg oral tablet: 3 tab(s) orally once a day (09 Dec 2019 18:37)  spironolactone 25 mg oral tablet: 1 tab(s) orally every other day (09 Dec 2019 18:37)  Tylenol Extra Strength 500 mg oral tablet: 2 tab(s) orally once a day, As Needed - for moderate pain (09 Dec 2019 18:37)  Tylenol Extra Strength 500 mg oral tablet: 2 tab(s) orally 2 times a day at 7am and 1pm (09 Dec 2019 18:37)  Vitamin B-12 1000 mcg oral tablet: 1 tab(s) orally once a day (09 Dec 2019 18:37)  Vitamin C 1000 mg oral tablet: 1 tab(s) orally once a day (09 Dec 2019 18:37)  Vitamin D3 1000 intl units oral capsule: 1 cap(s) orally once a day (09 Dec 2019 18:37)  zolpidem 6.25 mg oral tablet, extended release: 1 tab(s) orally once a day (at bedtime) (09 Dec 2019 18:37)    MEDICATIONS  (STANDING):  aspirin enteric coated 325 milliGRAM(s) Oral daily  atorvastatin 10 milliGRAM(s) Oral at bedtime  clonazePAM  Tablet 1 milliGRAM(s) Oral two times a day  clonazePAM  Tablet 0.5 milliGRAM(s) Oral daily  doxycycline hyclate Capsule 100 milliGRAM(s) Oral every 12 hours  ferrous    sulfate 325 milliGRAM(s) Oral two times a day  folic acid 1 milliGRAM(s) Oral daily  heparin  Injectable 5000 Unit(s) SubCutaneous every 12 hours  lactated ringers. 1000 milliLiter(s) (70 mL/Hr) IV Continuous <Continuous>  melatonin 3 milliGRAM(s) Oral at bedtime  meropenem  IVPB 500 milliGRAM(s) IV Intermittent every 12 hours  metoprolol tartrate 25 milliGRAM(s) Oral two times a day  mirtazapine 7.5 milliGRAM(s) Oral at bedtime  oxyCODONE    IR 5 milliGRAM(s) Oral two times a day  pantoprazole    Tablet 40 milliGRAM(s) Oral before breakfast  sertraline 100 milliGRAM(s) Oral daily    MEDICATIONS  (PRN):  acetaminophen   Tablet .. 1000 milliGRAM(s) Oral every 12 hours PRN Moderate Pain (4 - 6)  zolpidem 5 milliGRAM(s) Oral at bedtime PRN Insomnia  zolpidem 5 milliGRAM(s) Oral at bedtime PRN Insomnia      Allergies    Erythromycin Base (Other)  penicillin (Rash)    Intolerances        Vital Signs Last 24 Hrs  T(C): 36.6 (13 Dec 2019 05:38), Max: 36.6 (13 Dec 2019 05:38)  T(F): 97.8 (13 Dec 2019 05:38), Max: 97.8 (13 Dec 2019 05:38)  HR: 95 (13 Dec 2019 05:38) (91 - 97)  BP: 104/59 (13 Dec 2019 05:38) (97/60 - 110/55)  BP(mean): 71 (13 Dec 2019 05:38) (71 - 71)  RR: 17 (13 Dec 2019 05:38) (17 - 17)  SpO2: 95% (13 Dec 2019 05:38) (93% - 95%)      PHYSICAL EXAMINATION:    NECK:  Supple. No lymphadenopathy. Jugular venous pressure not elevated. Carotids equal.   HEART:   The cardiac impulse has a normal quality. Reg., Nl S1 and S2.  There are no murmurs, rubs or gallops noted  CHEST:  Chest crackles to auscultation. Normal respiratory effort.  ABDOMEN:  Soft and nontender.   EXTREMITIES:  There is no edema.       LABS:                        9.7    24.54 )-----------( 79       ( 13 Dec 2019 07:35 )             33.8     12-13    145  |  122<H>  |  70<H>  ----------------------------<  110<H>  4.3   |  15<L>  |  2.16<H>    Ca    8.1<L>      13 Dec 2019 07:35      PT/INR - ( 13 Dec 2019 07:42 )   PT: 20.1 sec;   INR: 1.78 ratio         PTT - ( 13 Dec 2019 07:42 )  PTT:36.1 sec    CT Chest No Cont (12.12.19 @ 10:29) >  IMPRESSION:     Moderate bilateral pleural effusions.  Large amount of ascites.  Bilateral lower lobe opacities compatible with atelectasis. Superimposed   pneumonia not excluded.  Low-density region in the spleen suggestive of infarction.

## 2019-12-13 NOTE — CONSULT NOTE ADULT - SUBJECTIVE AND OBJECTIVE BOX
HPI: Pt is a 84y old Female with hx of  HTN, HLD, arrythmia, CKD II presenting to the ED via EMS form [facility] c/o SOB and  weakness, decreased appetite Per EMS, pt reported that she fell out of bed twice within the past 2 days, but did not fall today. Patient is a poor historian. Admitted for pneumonia and UTI- started on IV antibiotics.     12/13/2019 patient seen and examined with no family at the bedside. Patient had thoracentesis today with CT surgery, patient denies any pain at this time. Patient is alert but confused.     PAIN: ( )Yes   ( x)No  DYSPNEA: ( ) Yes  (x ) No    PAST MEDICAL & SURGICAL HISTORY:  Chronic kidney disease  GERD (gastroesophageal reflux disease)  Urinary incontinence  Diarrhea  Osteoporosis  Obesity  Hyperlipidemia  Hypertension  Abdominal pain  Anxiety  Anemia  Myeloma  Nephrolithiasis  S/P cataract surgery: b/l  S/P colonoscopy  S/P tonsillectomy  Encounter for cholecystectomy  Abnormal biopsy result: abdomen May 2019  H/O total hip arthroplasty, bilateral  History of bilateral knee arthroplasty  H/O total hysterectomy  History of hernia repair      SOCIAL HX:    Hx opiate tolerance ( )YES  (x )NO    Baseline ADLs  (Prior to Admission)  ( x) Independent   ( )Dependent  with assistance     FAMILY HISTORY: patient unable to recall due to mentation     Review of Systems:  Unable to obtain/Limited due to: confusion     PHYSICAL EXAM:    Vital Signs Last 24 Hrs  T(C): 36.2 (13 Dec 2019 11:38), Max: 36.6 (13 Dec 2019 05:38)  T(F): 97.1 (13 Dec 2019 11:38), Max: 97.8 (13 Dec 2019 05:38)  HR: 85 (13 Dec 2019 11:38) (85 - 97)  BP: 101/57 (13 Dec 2019 11:38) (101/57 - 110/55)  BP(mean): 71 (13 Dec 2019 05:38) (71 - 71)  RR: 19 (13 Dec 2019 11:38) (17 - 19)  SpO2: 94% (13 Dec 2019 11:38) (94% - 95%)    PPSV2: 20 %    General: elderly pleasant women in bed, NAD noted   Mental Status: alert but confused   HEENT: + temporal wasting   Lungs: coarse b/l breath sounds, scattered wheezing   Cardiac: s1s2 +   GI: non tender non distended   : + voiding   Ext: no edema b/l     LABS:                        9.7    24.54 )-----------( 79       ( 13 Dec 2019 07:35 )             33.8     12-13    145  |  122<H>  |  70<H>  ----------------------------<  110<H>  4.3   |  15<L>  |  2.16<H>    Ca    8.1<L>      13 Dec 2019 07:35      PT/INR - ( 13 Dec 2019 07:42 )   PT: 20.1 sec;   INR: 1.78 ratio         PTT - ( 13 Dec 2019 07:42 )  PTT:36.1 sec  Albumin: Albumin, Serum: 2.2 g/dL (12-09 @ 12:27)      Allergies    Erythromycin Base (Other)  penicillin (Rash)    Intolerances      MEDICATIONS  (STANDING):  aspirin enteric coated 325 milliGRAM(s) Oral daily  atorvastatin 10 milliGRAM(s) Oral at bedtime  clonazePAM  Tablet 1 milliGRAM(s) Oral two times a day  clonazePAM  Tablet 0.5 milliGRAM(s) Oral daily  doxycycline hyclate Capsule 100 milliGRAM(s) Oral every 12 hours  ferrous    sulfate 325 milliGRAM(s) Oral two times a day  folic acid 1 milliGRAM(s) Oral daily  heparin  Injectable 5000 Unit(s) SubCutaneous every 12 hours  lactated ringers. 1000 milliLiter(s) (70 mL/Hr) IV Continuous <Continuous>  melatonin 3 milliGRAM(s) Oral at bedtime  meropenem  IVPB 500 milliGRAM(s) IV Intermittent every 12 hours  metoprolol tartrate 25 milliGRAM(s) Oral two times a day  mirtazapine 7.5 milliGRAM(s) Oral at bedtime  oxyCODONE    IR 5 milliGRAM(s) Oral two times a day  pantoprazole    Tablet 40 milliGRAM(s) Oral before breakfast  sertraline 100 milliGRAM(s) Oral daily    MEDICATIONS  (PRN):  acetaminophen   Tablet .. 1000 milliGRAM(s) Oral every 12 hours PRN Moderate Pain (4 - 6)  zolpidem 5 milliGRAM(s) Oral at bedtime PRN Insomnia  zolpidem 5 milliGRAM(s) Oral at bedtime PRN Insomnia      RADIOLOGY/ADDITIONAL STUDIES:  < from: Xray Chest 1 View- PORTABLE-Urgent (12.13.19 @ 11:01) >    EXAM:  XR CHEST PORTABLE URGENT 1V                            PROCEDURE DATE:  12/13/2019          INTERPRETATION:  Clinical history: Status post thoracentesis    Single portable view obtained    There are bibasilar opacities likely representing a combination of   pleural fluid and atelectasis. No evidence for pneumothorax.    Impression: Bibasilar opacities as above. No pneumothorax      < end of copied text >  < from: CT Chest No Cont (12.12.19 @ 10:29) >  EXAM:  CT CHEST                            PROCEDURE DATE:  12/12/2019          INTERPRETATION:  CLINICAL INFORMATION: Pneumonia. Shortness of breath.    COMPARISON: CT abdomen/pelvis August 18, 2019, CT chest February 24, 2016    PROCEDURE:   CT of the Chest was performed without intravenous contrast.  Sagittal and coronal reformats were performed.      FINDINGS:    LUNGS AND AIRWAYS: Patent central airways.  Bilateral lower lobe   compressive and dependent atelectasis.    PLEURA: Moderate right and small left pleural effusions.    MEDIASTINUM AND CASTILLO: No lymphadenopathy.    VESSELS: Right-sided central venous port with tip in the superior vena   cava. Atherosclerotic arterial calcifications, including diffuse coronary   artery calcification.    HEART: Heart size is normal. No pericardial effusion.    CHEST WALL AND LOWER NECK: Within normal limits.    VISUALIZED UPPER ABDOMEN: Large amount of ascites. Linear low density   region in the spleen, new from prior study. 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   Cholecystectomy. Nonobstructing right renal stones.    BONES: Left shoulder arthroplasty. No acute bony abnormality.    IMPRESSION:     Moderate bilateral pleural effusions.  Large amount of ascites.  Bilateral lower lobe opacities compatible with atelectasis. Superimposed   pneumonia not excluded.  Low-density region in the spleen suggestive of infarction.    < end of copied text >  < from: CT Head No Cont (12.09.19 @ 12:12) >  EXAM:  CT BRAIN                            PROCEDURE DATE:  12/09/2019          INTERPRETATION:      CT head without IV contrast        CLINICAL INFORMATION:  Fall   Intracranial hemorrhage.    TECHNIQUE: Contiguous axial 5 mm sections were obtained through the head.   Sagittal and coronal 2-D reformatted images were also obtained.   This   scan was performed using automatic exposure control (radiation dose   reduction software) to obtain a diagnostic image quality scan with   patient dose as low as reasonably achievable.     FINDINGS:   07/01/2019 CT head available for review.    The brain demonstrates mild periventricular and bifrontal biparietal   subcortical white matter ischemia is noted.   No acute cerebral cortical   infarct is seen. No intracranial hemorrhage is found.  No mass effect is   found in the brain.      The ventricles, sulci and basal cisterns appear unremarkable.    The orbits are unremarkable.  The paranasal sinuses are clear.  The nasal   cavity appears intact.  The nasopharynx is symmetric.  The central skull   base, petrous temporal bones and calvarium remain intact.      IMPRESSION:   mild periventricular and bifrontal biparietal subcortical   white matter ischemia is noted      < end of copied text >

## 2019-12-13 NOTE — PROGRESS NOTE ADULT - ASSESSMENT
Pt is an 83 y/o F with PMHx of HTN, HLD, arrythmia, CKD II admitted from assisted living on 12/9 for evaluation of shortness of breath and weakness; patient is unclear as to why she is in hospital; history per medical record as patient is poor historian. She gives history of penicillin allergy over 50 years ago and she does not recall her reaction.     1. Patient admitted with pneumonia which will treat as community acquired pneumonia; also noted with leukocytosis most likely reactive to infection  - follow up cultures   - oxygen and nebs as needed   - serial cbc and monitor temperature   - reviewed prior medical records to evaluate for resistant or atypical pathogens   - iv hydration and supportive care   - day #4  doxycycline  - started meropenem on 12/12  - had thoracentesis, follow up labs  - tolerating antibiotics without rashes or side effects   - Monitor closely in view of history of penicillin allergy   2. other issues: per medicine

## 2019-12-13 NOTE — PROGRESS NOTE ADULT - SUBJECTIVE AND OBJECTIVE BOX
85 y/o woman with a history of HTN, HLD, "arrythmia" (details not known), CKD, GERD, who presented to the ER from her assisted living facility on 12/9/19 for the evaluation of shortness of breath, abdominal pain, recent fall, generalized weakness -- subsequently diagnosed with pneumonia associated with pleural effusions, leukocytosis.  Patient is a poor informant and unable to provide any details of her illness.  She says, "I don't know how I feel" and "they say I have pneumonia."  Unable to identify exacerbating or alleviating factors.  Cardiology consult requested due to an episode of AF starting on 12/11/19.  Mrs. Wallace says she had an "irregular heart" but it "went away."  She denies experiencing palpitations and angina.    12/13/19: contacted family they deny any prior history of afib.  Pt w/o complaints, but has cognitive deficits.  tele w/ NSR no afib recurrence since yesterday.    MEDICATIONS:  MEDICATIONS  (STANDING):  aspirin enteric coated 325 milliGRAM(s) Oral daily  atorvastatin 10 milliGRAM(s) Oral at bedtime  clonazePAM  Tablet 1 milliGRAM(s) Oral two times a day  clonazePAM  Tablet 0.5 milliGRAM(s) Oral daily  doxycycline hyclate Capsule 100 milliGRAM(s) Oral every 12 hours  ferrous    sulfate 325 milliGRAM(s) Oral two times a day  folic acid 1 milliGRAM(s) Oral daily  lactated ringers. 1000 milliLiter(s) (70 mL/Hr) IV Continuous <Continuous>  melatonin 3 milliGRAM(s) Oral at bedtime  meropenem  IVPB 500 milliGRAM(s) IV Intermittent every 12 hours  metoprolol tartrate 25 milliGRAM(s) Oral two times a day  mirtazapine 7.5 milliGRAM(s) Oral at bedtime  oxyCODONE    IR 5 milliGRAM(s) Oral two times a day  pantoprazole    Tablet 40 milliGRAM(s) Oral before breakfast  sertraline 100 milliGRAM(s) Oral daily  warfarin 3 milliGRAM(s) Oral once    MEDICATIONS  (PRN):  acetaminophen   Tablet .. 1000 milliGRAM(s) Oral every 12 hours PRN Moderate Pain (4 - 6)  zolpidem 5 milliGRAM(s) Oral at bedtime PRN Insomnia  zolpidem 5 milliGRAM(s) Oral at bedtime PRN Insomnia    Vital Signs Last 24 Hrs  T(C): 36.2 (13 Dec 2019 11:38), Max: 36.6 (13 Dec 2019 05:38)  T(F): 97.1 (13 Dec 2019 11:38), Max: 97.8 (13 Dec 2019 05:38)  HR: 115 (13 Dec 2019 18:14) (85 - 115)  BP: 92/53 (13 Dec 2019 18:14) (92/53 - 104/59)  BP(mean): 71 (13 Dec 2019 05:38) (71 - 71)  RR: 19 (13 Dec 2019 11:38) (17 - 19)  SpO2: 94% (13 Dec 2019 11:38) (94% - 95%)    I&O's Summary      PHYSICAL EXAM:    Constitutional: NAD, awake   Neck:  supple,  No JVD  Respiratory: Breath sounds are clear bilaterally, No wheezing, rales or rhonchi  Cardiovascular: S1 and S2, regular rate and rhythm, no Murmurs, gallops or rubs  Gastrointestinal: Bowel Sounds present, soft, nontender.   Extremities: No peripheral edema. No clubbing or cyanosis.  Vascular: 2+ peripheral pulses  Neurological: A/O x 3, no focal deficits  Musculoskeletal: no calf tenderness.  Skin: No rashes.      LABS: All Labs Reviewed:                        9.7    24.54 )-----------( 79       ( 13 Dec 2019 07:35 )             33.8                         9.7    18.64 )-----------( 76       ( 11 Dec 2019 07:20 )             33.0     13 Dec 2019 07:35    145    |  122    |  70     ----------------------------<  110    4.3     |  15     |  2.16   12 Dec 2019 07:52    147    |  121    |  70     ----------------------------<  110    4.4     |  17     |  2.12   11 Dec 2019 07:20    144    |  120    |  72     ----------------------------<  93     4.3     |  16     |  2.26     Ca    8.1        13 Dec 2019 07:35  Ca    7.6        12 Dec 2019 07:52  Ca    7.0        11 Dec 2019 07:20      PT/INR - ( 13 Dec 2019 07:42 )   PT: 20.1 sec;   INR: 1.78 ratio         PTT - ( 13 Dec 2019 07:42 )  PTT:36.1 sec

## 2019-12-13 NOTE — CONSULT NOTE ADULT - SUBJECTIVE AND OBJECTIVE BOX
HPI:  Pt is an 83 y/o F with PMHx of HTN, HLD, arrythmia, CKD II presenting to the ED via EMS form [facility] c/o SOB and  weakness, decreased appetitite.   She is a very poor historian. She was confused on examination.  She was unable to answer any questions during the interview.         Fam HX:   unavailable (09 Dec 2019 14:51)      PAST MEDICAL & SURGICAL HISTORY:  Chronic kidney disease  GERD (gastroesophageal reflux disease)  Urinary incontinence  Diarrhea  Osteoporosis  Obesity  Hyperlipidemia  Hypertension  Abdominal pain  Anxiety  Anemia  Myeloma  Nephrolithiasis  S/P cataract surgery: b/l  S/P colonoscopy  S/P tonsillectomy  Encounter for cholecystectomy  Abnormal biopsy result: abdomen May 2019  H/O total hip arthroplasty, bilateral  History of bilateral knee arthroplasty  H/O total hysterectomy  History of hernia repair      REVIEW OF SYSTEMS  Unable to obtain due to mental status. Patient denies any chest pain. She repetitively states that she is tired.           MEDICATIONS  (STANDING):  aspirin enteric coated 325 milliGRAM(s) Oral daily  atorvastatin 10 milliGRAM(s) Oral at bedtime  clonazePAM  Tablet 1 milliGRAM(s) Oral two times a day  clonazePAM  Tablet 0.5 milliGRAM(s) Oral daily  doxycycline hyclate Capsule 100 milliGRAM(s) Oral every 12 hours  ferrous    sulfate 325 milliGRAM(s) Oral two times a day  folic acid 1 milliGRAM(s) Oral daily  heparin  Injectable 5000 Unit(s) SubCutaneous every 12 hours  lactated ringers. 1000 milliLiter(s) (70 mL/Hr) IV Continuous <Continuous>  melatonin 3 milliGRAM(s) Oral at bedtime  meropenem  IVPB 500 milliGRAM(s) IV Intermittent every 12 hours  metoprolol tartrate 25 milliGRAM(s) Oral two times a day  mirtazapine 7.5 milliGRAM(s) Oral at bedtime  oxyCODONE    IR 5 milliGRAM(s) Oral two times a day  pantoprazole    Tablet 40 milliGRAM(s) Oral before breakfast  sertraline 100 milliGRAM(s) Oral daily    MEDICATIONS  (PRN):  acetaminophen   Tablet .. 1000 milliGRAM(s) Oral every 12 hours PRN Moderate Pain (4 - 6)  zolpidem 5 milliGRAM(s) Oral at bedtime PRN Insomnia  zolpidem 5 milliGRAM(s) Oral at bedtime PRN Insomnia      Allergies    Erythromycin Base (Other)  penicillin (Rash)    Intolerances        SOCIAL HISTORY:  Occupation:  Smoking Hx: denies  Etoh Hx: denies  IVDA Hx: denies    FAMILY HISTORY:    unless noted, no significant family hx with Mother, Father, Siblings    Vital Signs Last 24 Hrs  T(C): 36.2 (13 Dec 2019 11:38), Max: 36.6 (13 Dec 2019 05:38)  T(F): 97.1 (13 Dec 2019 11:38), Max: 97.8 (13 Dec 2019 05:38)  HR: 85 (13 Dec 2019 11:38) (85 - 97)  BP: 101/57 (13 Dec 2019 11:38) (101/57 - 110/55)  BP(mean): 71 (13 Dec 2019 05:38) (71 - 71)  RR: 19 (13 Dec 2019 11:38) (17 - 19)  SpO2: 94% (13 Dec 2019 11:38) (94% - 95%)    General: WN/WD NAD  Neurology: Awake, nonfocal, confused, orientated to self   Eyes: Scleras clear, PERRLA/ EOMI, Gross vision intact  ENT:Gross hearing intact, grossly patent pharynx, no stridor  Neck: Neck supple, trachea midline, No JVD,   Respiratory: diminished breath sounds at the bases   CV: RRR, S1S2, no murmurs, rubs or gallops  Abdominal: Soft, NT, ND +BS,   Extremities: bilateral lower ext edema   Skin: No Rashes, Hematoma, Ecchymosis  Lymphatic: No Neck, axilla, groin LAD  Psych: Oriented to self       LABS:                        9.7    24.54 )-----------( 79       ( 13 Dec 2019 07:35 )             33.8     12-13    145  |  122<H>  |  70<H>  ----------------------------<  110<H>  4.3   |  15<L>  |  2.16<H>    Ca    8.1<L>      13 Dec 2019 07:35      PT/INR - ( 13 Dec 2019 07:42 )   PT: 20.1 sec;   INR: 1.78 ratio         PTT - ( 13 Dec 2019 07:42 )  PTT:36.1 sec      RADIOLOGY & ADDITIONAL STUDIES:    ASSESSMENT:   84yFemalePAST MEDICAL & SURGICAL HISTORY:  Chronic kidney disease  GERD (gastroesophageal reflux disease)  Urinary incontinence  Diarrhea  Osteoporosis  Obesity  Hyperlipidemia  Hypertension  Abdominal pain  Anxiety  Anemia  Myeloma  Nephrolithiasis  S/P cataract surgery: b/l  S/P colonoscopy  S/P tonsillectomy  Encounter for cholecystectomy  Abnormal biopsy result: abdomen May 2019  H/O total hip arthroplasty, bilateral  History of bilateral knee arthroplasty  H/O total hysterectomy  History of hernia repair  HEALTH ISSUES - PROBLEM Dx:  Pleural effusion: Pleural effusion  Paroxysmal atrial fibrillation: Paroxysmal atrial fibrillation      HEALTH ISSUES - R/O PROBLEM Dx:      PLAN:  After speaking to get family to get consent, a right thoracentesis was performed.   I personally reviewed all information. Her CT chest shows right greater than left pleural effusion. 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     The fluid was sent for studies including cytology, micro, ph, and fluid analysis.   The fluid did not appear grossly infected, it was straw colored in nature.     Thank You for the consult, will cont to follow along with you.

## 2019-12-13 NOTE — PROGRESS NOTE ADULT - SUBJECTIVE AND OBJECTIVE BOX
Patient is a 83 y/o F with PMHx of HTN, HLD, arrythmia, CKD II presenting to the ED via EMS form [facility] c/o SOB and  weakness, decreased appetitite. Per EMS, pt reported that she fell out of bed twice within the past 2 days, but did not fall today. Patient is a poor historian. Admitted for pneumonia and UTI- started on IV antibiotics.     12/13-     Vital Signs Last 24 Hrs  T(C): 36.6 (13 Dec 2019 05:38), Max: 36.6 (13 Dec 2019 05:38)  T(F): 97.8 (13 Dec 2019 05:38), Max: 97.8 (13 Dec 2019 05:38)  HR: 95 (13 Dec 2019 05:38) (91 - 97)  BP: 104/59 (13 Dec 2019 05:38) (97/60 - 110/55)  BP(mean): 71 (13 Dec 2019 05:38) (71 - 71)  RR: 17 (13 Dec 2019 05:38) (17 - 17)  SpO2: 95% (13 Dec 2019 05:38) (93% - 95%)    REVIEW OF SYSTEMS:  CONSTITUTIONAL: No weakness, fevers or chills  EYES/ENT: No visual changes;  No vertigo or throat pain   NECK: No pain or stiffness  RESPIRATORY: No cough, wheezing, hemoptysis; No shortness of breath  CARDIOVASCULAR: No chest pain or palpitations  GASTROINTESTINAL: No abdominal or epigastric pain. No nausea, vomiting, or hematemesis; No diarrhea or constipation. No melena or hematochezia.  GENITOURINARY: No dysuria, frequency or hematuria  NEUROLOGICAL: No numbness or weakness  SKIN: No itching, burning, rashes, or lesions   All other review of systems is negative unless indicated above.    PE:  Constitutional: NAD, laying in bed  HEENT: NC/AT  Back: no tenderness  Respiratory: respirations even and non labored, coarse breath sounds throughout lung fields.   Cardiovascular: S1S2 regular, no murmurs  Abdomen: soft, not tender, not distended, positive BS  Genitourinary: voiding  Rectal: deferred  Musculoskeletal: no muscle tenderness, no joint swelling or tenderness               12-13    145  |  122<H>  |  70<H>  ----------------------------<  110<H>  4.3   |  15<L>  |  2.16<H>    Ca    8.1<L>      13 Dec 2019 07:35                          9.7    24.54 )-----------( 79       ( 13 Dec 2019 07:35 )             33.8     INTERPRETATION:  Short of breath.    AP chest. Prior 6/17/2019.    Chin obscures portion right apex. Low lung volumes. No change heart   mediastinum. Right chestport reidentified in situ. There is new focal   consolidation the right lower lobe likely representing pneumonia and   atelectasis in the appropriate clinical setting. New small right pleural   effusion.    Impression: Right lower lobe consolidation and small right parapneumonic   effusion    MEDICATIONS  (STANDING):  aspirin enteric coated 325 milliGRAM(s) Oral daily  atorvastatin 10 milliGRAM(s) Oral at bedtime  clonazePAM  Tablet 1 milliGRAM(s) Oral two times a day  clonazePAM  Tablet 0.5 milliGRAM(s) Oral daily  doxycycline hyclate Capsule 100 milliGRAM(s) Oral every 12 hours  ferrous    sulfate 325 milliGRAM(s) Oral two times a day  folic acid 1 milliGRAM(s) Oral daily  heparin  Injectable 5000 Unit(s) SubCutaneous every 12 hours  lactated ringers. 1000 milliLiter(s) (70 mL/Hr) IV Continuous <Continuous>  melatonin 3 milliGRAM(s) Oral at bedtime  meropenem  IVPB 500 milliGRAM(s) IV Intermittent every 12 hours  metoprolol tartrate 25 milliGRAM(s) Oral two times a day  mirtazapine 7.5 milliGRAM(s) Oral at bedtime  oxyCODONE    IR 5 milliGRAM(s) Oral two times a day  pantoprazole    Tablet 40 milliGRAM(s) Oral before breakfast  sertraline 100 milliGRAM(s) Oral daily    MEDICATIONS  (PRN):  acetaminophen   Tablet .. 1000 milliGRAM(s) Oral every 12 hours PRN Moderate Pain (4 - 6)  zolpidem 5 milliGRAM(s) Oral at bedtime PRN Insomnia  zolpidem 5 milliGRAM(s) Oral at bedtime PRN Insomnia Patient is a 83 y/o F with PMHx of HTN, HLD, arrythmia, CKD II presenting to the ED via EMS form [facility] c/o SOB and  weakness, decreased appetitite. Per EMS, pt reported that she fell out of bed twice within the past 2 days, but did not fall today. Patient is a poor historian. Admitted for pneumonia and UTI- started on IV antibiotics.     12/13- patient was seen and examined. S/p thoracenetesis with CT surgery- tolerated procedure well. Patient complaining of pain on procedure site. denies chest pain, fever or chills.     Vital Signs Last 24 Hrs  T(C): 36.6 (13 Dec 2019 05:38), Max: 36.6 (13 Dec 2019 05:38)  T(F): 97.8 (13 Dec 2019 05:38), Max: 97.8 (13 Dec 2019 05:38)  HR: 95 (13 Dec 2019 05:38) (91 - 97)  BP: 104/59 (13 Dec 2019 05:38) (97/60 - 110/55)  BP(mean): 71 (13 Dec 2019 05:38) (71 - 71)  RR: 17 (13 Dec 2019 05:38) (17 - 17)  SpO2: 95% (13 Dec 2019 05:38) (93% - 95%)    REVIEW OF SYSTEMS:  CONSTITUTIONAL: No weakness, fevers or chills  EYES/ENT: No visual changes;  No vertigo or throat pain   NECK: No pain or stiffness  RESPIRATORY: No cough, wheezing, hemoptysis; No shortness of breath  CARDIOVASCULAR: No chest pain or palpitations  GASTROINTESTINAL: No abdominal or epigastric pain. No nausea, vomiting, or hematemesis; No diarrhea or constipation. No melena or hematochezia.  GENITOURINARY: No dysuria, frequency or hematuria  NEUROLOGICAL: No numbness or weakness  SKIN: No itching, burning, rashes, or lesions   All other review of systems is negative unless indicated above.    PE:  Constitutional: NAD, laying in bed  HEENT: NC/AT  Back: no tenderness  Respiratory: respirations even and non labored, coarse breath sounds throughout lung fields.   Cardiovascular: S1S2 regular, no murmurs  Abdomen: soft, not tender, not distended, positive BS  Genitourinary: voiding  Rectal: deferred  Musculoskeletal: no muscle tenderness, no joint swelling or tenderness               12-13    145  |  122<H>  |  70<H>  ----------------------------<  110<H>  4.3   |  15<L>  |  2.16<H>    Ca    8.1<L>      13 Dec 2019 07:35                          9.7    24.54 )-----------( 79       ( 13 Dec 2019 07:35 )             33.8     INTERPRETATION:  Short of breath.    AP chest. Prior 6/17/2019.    Chin obscures portion right apex. Low lung volumes. No change heart   mediastinum. Right chestport reidentified in situ. There is new focal   consolidation the right lower lobe likely representing pneumonia and   atelectasis in the appropriate clinical setting. New small right pleural   effusion.    Impression: Right lower lobe consolidation and small right parapneumonic   effusion    MEDICATIONS  (STANDING):  aspirin enteric coated 325 milliGRAM(s) Oral daily  atorvastatin 10 milliGRAM(s) Oral at bedtime  clonazePAM  Tablet 1 milliGRAM(s) Oral two times a day  clonazePAM  Tablet 0.5 milliGRAM(s) Oral daily  doxycycline hyclate Capsule 100 milliGRAM(s) Oral every 12 hours  ferrous    sulfate 325 milliGRAM(s) Oral two times a day  folic acid 1 milliGRAM(s) Oral daily  heparin  Injectable 5000 Unit(s) SubCutaneous every 12 hours  lactated ringers. 1000 milliLiter(s) (70 mL/Hr) IV Continuous <Continuous>  melatonin 3 milliGRAM(s) Oral at bedtime  meropenem  IVPB 500 milliGRAM(s) IV Intermittent every 12 hours  metoprolol tartrate 25 milliGRAM(s) Oral two times a day  mirtazapine 7.5 milliGRAM(s) Oral at bedtime  oxyCODONE    IR 5 milliGRAM(s) Oral two times a day  pantoprazole    Tablet 40 milliGRAM(s) Oral before breakfast  sertraline 100 milliGRAM(s) Oral daily    MEDICATIONS  (PRN):  acetaminophen   Tablet .. 1000 milliGRAM(s) Oral every 12 hours PRN Moderate Pain (4 - 6)  zolpidem 5 milliGRAM(s) Oral at bedtime PRN Insomnia  zolpidem 5 milliGRAM(s) Oral at bedtime PRN Insomnia Patient is a 83 y/o F with PMHx of HTN, HLD, arrythmia, CKD II presenting to the ED via EMS form [facility] c/o SOB and  weakness, decreased appetitite. Per EMS, pt reported that she fell out of bed twice within the past 2 days, but did not fall today. Patient is a poor historian. Admitted for pneumonia and UTI- started on IV antibiotics.     12/13: Patient was seen and examined. S/p thoracenetesis with CT surgery- tolerated procedure well. Patient complaining of pain on procedure site. denies chest pain, fever or chills.     REVIEW OF SYSTEMS:  CONSTITUTIONAL: No weakness, fevers or chills  EYES/ENT: No visual changes;  No vertigo or throat pain   NECK: No pain or stiffness  RESPIRATORY: No cough, wheezing, hemoptysis; No shortness of breath  CARDIOVASCULAR: No chest pain or palpitations  GASTROINTESTINAL: No abdominal or epigastric pain. No nausea, vomiting, or hematemesis; No diarrhea or constipation. No melena or hematochezia.  GENITOURINARY: No dysuria, frequency or hematuria  NEUROLOGICAL: No numbness or weakness  SKIN: No itching, burning, rashes, or lesions   All other review of systems is negative unless indicated above.    PE:  Constitutional: NAD, laying in bed  HEENT: NC/AT  Back: no tenderness  Respiratory: respirations even and non labored, coarse breath sounds throughout lung fields.   Cardiovascular: S1S2 regular, no murmurs  Abdomen: soft, not tender, not distended, positive BS  Genitourinary: voiding  Rectal: deferred  Musculoskeletal: no muscle tenderness, no joint swelling or tenderness                                  INTERPRETATION:  Short of breath.    AP chest. Prior 6/17/2019.    Chin obscures portion right apex. Low lung volumes. No change heart   mediastinum. Right chestport reidentified in situ. There is new focal   consolidation the right lower lobe likely representing pneumonia and   atelectasis in the appropriate clinical setting. New small right pleural   effusion.    Impression: Right lower lobe consolidation and small right parapneumonic   effusion Patient is a 85 y/o F with PMHx of HTN, HLD, arrythmia, CKD II presenting to the ED via EMS form [facility] c/o SOB and  weakness, decreased appetitite. Per EMS, pt reported that she fell out of bed twice within the past 2 days, but did not fall today. Patient is a poor historian. Admitted for pneumonia and UTI- started on IV antibiotics.     12/13: Patient was seen and examined. S/p thoracenetesis with CT surgery- tolerated procedure well. Patient complaining of pain on procedure site. denies chest pain, fever or chills. Care discussed with Dr. Jurado and Dr. camacho. Started heparin /  coumadin bridge.      REVIEW OF SYSTEMS:  CONSTITUTIONAL: + weakness  EYES/ENT: No visual changes;  No vertigo or throat pain   NECK: No pain or stiffness  RESPIRATORY: No cough, wheezing, hemoptysis; No shortness of breath  CARDIOVASCULAR: Abdominal distention  GENITOURINARY: No dysuria, frequency or hematuria  NEUROLOGICAL: No numbness or weakness  SKIN: No itching, burning, rashes, or lesions   All other review of systems is negative unless indicated above.    PE:  Constitutional: NAD, laying in bed  HEENT: NC/AT  Back: no tenderness  Respiratory: respirations even and non labored, coarse breath sounds throughout lung fields.   Cardiovascular: S1S2 regular, no murmurs  Abdomen: soft, not tender, + distended,  Genitourinary: voiding  Rectal: deferred  Musculoskeletal: no muscle tenderness, no joint swelling or tenderness                                  INTERPRETATION:  Short of breath.    AP chest. Prior 6/17/2019.    Chin obscures portion right apex. Low lung volumes. No change heart   mediastinum. Right chestport reidentified in situ. There is new focal   consolidation the right lower lobe likely representing pneumonia and   atelectasis in the appropriate clinical setting. New small right pleural   effusion.    Impression: Right lower lobe consolidation and small right parapneumonic   effusion

## 2019-12-13 NOTE — PROGRESS NOTE ADULT - ASSESSMENT
PROBLEMS:    CAP-right lower lobe consolidation and small right parapneumonic effusion- persistant elevated wbc CT chest to evaluate effusion may need thoracentesis to evalue empyema  UTI-e-coli  DAMON on CKD stage 2- prerenal   leukocytosis / anemia /  thrombocytopenia    PLAN:    R thoracentesis to evalue empyema  IV Rhocephin/doxycylin  supportive care  D/w DR Hein & CT surgery  dvt prophylasix

## 2019-12-13 NOTE — PROGRESS NOTE ADULT - PROBLEM SELECTOR PLAN 1
episode of afib for 9 hrs 12/11-12/12.  Remained in sinus rhythm since. No prior h/o afib.  Discussed w/ Dr. Hein will start coumadin for anticoag & carefully monitor for signs of bleeding will bridge w/ heparin.  Check TSH.  Check Echo

## 2019-12-13 NOTE — PROGRESS NOTE ADULT - SUBJECTIVE AND OBJECTIVE BOX
Date of service: 12-13-19 @ 12:12    Patient lying in bed; had thoracentesis done earlier  Afebrile, is tearful and fearful        ROS unable to obtain secondary to patient medical condition     MEDICATIONS  (STANDING):  aspirin enteric coated 325 milliGRAM(s) Oral daily  atorvastatin 10 milliGRAM(s) Oral at bedtime  clonazePAM  Tablet 1 milliGRAM(s) Oral two times a day  clonazePAM  Tablet 0.5 milliGRAM(s) Oral daily  doxycycline hyclate Capsule 100 milliGRAM(s) Oral every 12 hours  ferrous    sulfate 325 milliGRAM(s) Oral two times a day  folic acid 1 milliGRAM(s) Oral daily  heparin  Injectable 5000 Unit(s) SubCutaneous every 12 hours  lactated ringers. 1000 milliLiter(s) (70 mL/Hr) IV Continuous <Continuous>  melatonin 3 milliGRAM(s) Oral at bedtime  meropenem  IVPB 500 milliGRAM(s) IV Intermittent every 12 hours  metoprolol tartrate 25 milliGRAM(s) Oral two times a day  mirtazapine 7.5 milliGRAM(s) Oral at bedtime  oxyCODONE    IR 5 milliGRAM(s) Oral two times a day  pantoprazole    Tablet 40 milliGRAM(s) Oral before breakfast  sertraline 100 milliGRAM(s) Oral daily    MEDICATIONS  (PRN):  acetaminophen   Tablet .. 1000 milliGRAM(s) Oral every 12 hours PRN Moderate Pain (4 - 6)  zolpidem 5 milliGRAM(s) Oral at bedtime PRN Insomnia  zolpidem 5 milliGRAM(s) Oral at bedtime PRN Insomnia      Vital Signs Last 24 Hrs  T(C): 36.2 (13 Dec 2019 11:38), Max: 36.6 (13 Dec 2019 05:38)  T(F): 97.1 (13 Dec 2019 11:38), Max: 97.8 (13 Dec 2019 05:38)  HR: 85 (13 Dec 2019 11:38) (85 - 97)  BP: 101/57 (13 Dec 2019 11:38) (97/60 - 110/55)  BP(mean): 71 (13 Dec 2019 05:38) (71 - 71)  RR: 19 (13 Dec 2019 11:38) (17 - 19)  SpO2: 94% (13 Dec 2019 11:38) (93% - 95%)    Physical Exam:          PE:    Constitutional: frail looking  HEENT: NC/AT, EOMI, PERRLA, conjunctivae clear; ears and nose atraumatic; pharynx clear  Neck: supple; thyroid not palpable  Back: no tenderness  Respiratory: respiratory effort normal; scattered coarse breath sounds  Cardiovascular: S1S2 regular, no murmurs  Abdomen: soft, not tender, not distended, positive BS; no liver or spleen organomegaly  Genitourinary: no suprapubic tenderness  Musculoskeletal: no muscle tenderness, no joint swelling or tenderness  Neurological/ Psychiatric:   moving all extremities  Skin: no rashes; no palpable lesions    Labs: all available labs reviewed                 Labs:                        Labs:                        9.7    24.54 )-----------( 79       ( 13 Dec 2019 07:35 )             33.8     12-13    145  |  122<H>  |  70<H>  ----------------------------<  110<H>  4.3   |  15<L>  |  2.16<H>    Ca    8.1<L>      13 Dec 2019 07:35             Cultures:       Culture - Blood (collected 12-09-19 @ 13:47)  Source: .Blood None  Preliminary Report (12-10-19 @ 18:01):    No growth to date.    Culture - Blood (collected 12-09-19 @ 13:38)  Source: .Blood None  Preliminary Report (12-10-19 @ 18:01):    No growth to date.    Culture - Urine (collected 12-09-19 @ 12:51)  Source: .Urine None  Final Report (12-11-19 @ 17:50):    >100,000 CFU/ml Escherichia coli  Organism: Escherichia coli (12-11-19 @ 17:50)  Organism: Escherichia coli (12-11-19 @ 17:50)      -  Amikacin: S <=16      -  Ampicillin: R >16 These ampicillin results predict results for amoxicillin      -  Ampicillin/Sulbactam: I 16/8 Enterobacter, Citrobacter, and Serratia may develop resistance during prolonged therapy (3-4 days)      -  Aztreonam: S <=4      -  Cefazolin: R >16 (MIC_CL_COM_ENTERIC_CEFAZU) For uncomplicated UTI with K. pneumoniae, E. coli, or P. mirablis: CORBY <=16 is sensitive and CORBY >=32 is resistant. This also predicts results for oral agents cefaclor, cefdinir, cefpodoxime, cefprozil, cefuroxime axetil, cephalexin and locarbef for uncomplicated UTI. Note that some isolates may be susceptible to these agents while testing resistant to cefazolin.      -  Cefepime: S <=4      -  Cefoxitin: R >16      -  Ceftriaxone: S <=1 Enterobacter, Citrobacter, and Serratia may develop resistance during prolonged therapy      -  Ciprofloxacin: R >2      -  Gentamicin: R >8      -  Imipenem: S <=1      -  Levofloxacin: R >4      -  Meropenem: S <=1      -  Nitrofurantoin: R >64 Should not be used to treat pyelonephritis      -  Piperacillin/Tazobactam: S <=16      -  Tigecycline: S <=2      -  Tobramycin: I 8      -  Trimethoprim/Sulfamethoxazole: R >2/38      Method Type: CORBY              < from: Xray Chest 1 View AP/PA. (12.09.19 @ 11:45) >    EXAM:  XR CHEST 1 VIEW                            PROCEDURE DATE:  12/09/2019          INTERPRETATION:  Short of breath.    AP chest. Prior 6/17/2019.    Chin obscures portion right apex. Low lung volumes. No change heart   mediastinum. Right chestport reidentified in situ. There is new focal   consolidation the right lower lobe likely representing pneumonia and   atelectasis in the appropriate clinical setting. New small right pleural   effusion.    Impression: Right lower lobe consolidation and small right parapneumonic   effusion      < end of copied text >          Radiology: all available radiological tests reviewed    Advanced directives addressed: full resuscitation

## 2019-12-13 NOTE — PROGRESS NOTE ADULT - SUBJECTIVE AND OBJECTIVE BOX
Patient is a 84y Female who reports no complaints, confused and calling out    MEDICATIONS  (STANDING):  aspirin enteric coated 325 milliGRAM(s) Oral daily  atorvastatin 10 milliGRAM(s) Oral at bedtime  clonazePAM  Tablet 1 milliGRAM(s) Oral two times a day  clonazePAM  Tablet 0.5 milliGRAM(s) Oral daily  doxycycline hyclate Capsule 100 milliGRAM(s) Oral every 12 hours  ferrous    sulfate 325 milliGRAM(s) Oral two times a day  folic acid 1 milliGRAM(s) Oral daily  heparin  Injectable 5000 Unit(s) SubCutaneous every 12 hours  lactated ringers. 1000 milliLiter(s) (70 mL/Hr) IV Continuous <Continuous>  melatonin 3 milliGRAM(s) Oral at bedtime  meropenem  IVPB 500 milliGRAM(s) IV Intermittent every 12 hours  metoprolol tartrate 25 milliGRAM(s) Oral two times a day  mirtazapine 7.5 milliGRAM(s) Oral at bedtime  oxyCODONE    IR 5 milliGRAM(s) Oral two times a day  pantoprazole    Tablet 40 milliGRAM(s) Oral before breakfast  sertraline 100 milliGRAM(s) Oral daily    MEDICATIONS  (PRN):  acetaminophen   Tablet .. 1000 milliGRAM(s) Oral every 12 hours PRN Moderate Pain (4 - 6)  zolpidem 5 milliGRAM(s) Oral at bedtime PRN Insomnia  zolpidem 5 milliGRAM(s) Oral at bedtime PRN Insomnia        T(C): , Max: 36.6 (12-13-19 @ 05:38)  T(F): , Max: 97.8 (12-13-19 @ 05:38)  HR: 85 (12-13-19 @ 11:38)  BP: 101/57 (12-13-19 @ 11:38)  BP(mean): 71 (12-13-19 @ 05:38)  RR: 19 (12-13-19 @ 11:38)  SpO2: 94% (12-13-19 @ 11:38)  Wt(kg): --        PHYSICAL EXAM:    Constitutional: NAD, frail,  HEENT: temp wasting, cachectic  Neck: No LAD, No JVD  Respiratory: dist BS  Cardiovascular: S1 and S2, RRR  Gastrointestinal: BS+, soft, NT/ND  Extremities: No peripheral edema  Neurological: Alert confused  : No Herbert  Skin: No rashes  Access: Not applicable        LABS:                        9.7    24.54 )-----------( 79       ( 13 Dec 2019 07:35 )             33.8     13 Dec 2019 07:35    145    |  122    |  70     ----------------------------<  110    4.3     |  15     |  2.16   12 Dec 2019 07:52    147    |  121    |  70     ----------------------------<  110    4.4     |  17     |  2.12   11 Dec 2019 07:20    144    |  120    |  72     ----------------------------<  93     4.3     |  16     |  2.26   09 Dec 2019 22:39    142    |  117    |  82     ----------------------------<  100    4.7     |  17     |  2.77     Ca    8.1        13 Dec 2019 07:35  Ca    7.6        12 Dec 2019 07:52  Ca    7.0        11 Dec 2019 07:20  Ca    7.2        09 Dec 2019 22:39            Urine Studies:          RADIOLOGY & ADDITIONAL STUDIES:

## 2019-12-13 NOTE — PROGRESS NOTE ADULT - ASSESSMENT
* Sepsis in the setting of Pneumonia / ? ? ? parapneumonic effusion vs empyema  * elevated WBC most likely infectious in origin, but differential could be a dz process driving WBC elevation  * Failure to thrive  * UTI  * DAMON on CKD stage 2- suspect prerenal with poor PO intake  * leukocytosis / anemia /  thrombocytopenia  - Cxray showed right lower lobe consolidation and small right parapneumonic effusion  -  broader coverage for meropenem  - tolerating antibiotics without rashes or side effects   - Monitor closely in view of history of penicillin allergy   - supplemental O2 as needed  - nephrology consult for DAMON  - monitor creatinine   - hold spironolactone for now  - renally dose meds  - palliative care evaluation for GOC meeting   - care discussed with Dr. Iverson.   - Care discussed with Dr. Isabel, family knows and patient that patient has a metastatic dz. Patient has received chemo to decrease symptom burder.    - CT chest showed moderate bilateral pleural effusion, large amount of ascites and bilateral lowr lobe opacities complatibe with atelectasis and probable superimposed pneumonia.  - CT surgery consult for possible thoracentesis.  - antibiotics optimized to meropenem.      # Smoldering myeloma  - heme following    # Primary peritoneal carcinoma metastatic  # ? Spleenic infarct  - right sided chest port  - heme following * Sepsis in the setting of Pneumonia / ? ? ? parapneumonic effusion vs empyema  * elevated WBC most likely infectious in origin, but differential could be a dz process driving WBC elevation  * Failure to thrive  * UTI  * DAMON on CKD stage 2- suspect prerenal with poor PO intake  * leukocytosis / anemia /  thrombocytopenia  - Cxray showed right lower lobe consolidation and small right parapneumonic effusion  -  broader coverage for meropenem  - tolerating antibiotics without rashes or side effects   - Monitor closely in view of history of penicillin allergy   - supplemental O2 as needed  - nephrology consult for DAMON  - monitor creatinine   - hold spironolactone for now  - renally dose meds  - palliative care evaluation for GOC meeting   - care discussed with Dr. Iverson.   - Care discussed with Dr. Isabel, family knows and patient that patient has a metastatic dz. Patient has received chemo to decrease symptom burder.    - CT chest showed moderate bilateral pleural effusion, large amount of ascites and bilateral lowr lobe opacities complatibe with atelectasis and probable superimposed pneumonia.  - CT surgery consult, s/p thoracentesis- specimen sent for   - antibiotics optimized to meropenem.      # Smoldering myeloma  - heme following    # Primary peritoneal carcinoma metastatic  # ? Spleenic infarct  - right sided chest port  - heme following * Sepsis in the setting of Pneumonia / ? ? ? parapneumonic effusion vs empyema  * elevated WBC most likely infectious in origin, but differential could be a dz process driving WBC elevation  * Failure to thrive  * UTI  * DAMON on CKD stage 2- suspect prerenal with poor PO intake  * leukocytosis / anemia /  thrombocytopenia  - patient s/p thoracenthesis - pending workup  - Cxray showed right lower lobe consolidation and small right parapneumonic effusion  - broader coverage for meropenem  - tolerating antibiotics without rashes or side effects   - Monitor closely in view of history of penicillin allergy   - supplemental O2 as needed  - nephrology consult for DAMON  - monitor creatinine   - hold spironolactone for now  - renally dose meds  - palliative care evaluation for GOC meeting   - care discussed with Dr. Iverson.   - Care discussed with Dr. Isabel, family knows and patient that patient has a metastatic dz. Patient has received chemo to decrease symptom burder.    - CT chest showed moderate bilateral pleural effusion, large amount of ascites and bilateral lowr lobe opacities complatibe with atelectasis and probable superimposed pneumonia.  - CT surgery consult, s/p thoracentesis- specimen sent for   - antibiotics optimized to meropenem.      # Smoldering myeloma  - heme following    # Primary peritoneal carcinoma metastatic  # ? Spleenic infarct  - right sided chest port  - heme following * Sepsis in the setting of Pneumonia / ? ? ? parapneumonic effusion vs empyema  * elevated WBC most likely infectious in origin, but differential could be a dz process driving WBC elevation  * Failure to thrive  * UTI  * DAMON on CKD stage 2- suspect prerenal with poor PO intake  * leukocytosis / anemia /  thrombocytopenia  - patient s/p thoracenthesis - pending workup  - Cxray showed right lower lobe consolidation and small right parapneumonic effusion  - broader coverage for meropenem  - tolerating antibiotics without rashes or side effects   - Monitor closely in view of history of penicillin allergy   - supplemental O2 as needed  - nephrology consult for DAMON  - monitor creatinine   - hold spironolactone for now  - renally dose meds  - palliative care evaluation for GOC meeting   - care discussed with Dr. Iverson.   - Care discussed with Dr. Isabel, family knows and patient that patient has a metastatic dz. Patient has received chemo to decrease symptom burder.    - CT chest showed moderate bilateral pleural effusion, large amount of ascites and bilateral lowr lobe opacities complatibe with atelectasis and probable superimposed pneumonia.  - CT surgery consult, s/p thoracentesis- specimen sent for   - antibiotics optimized to meropenem.     * Spleneic infarct / thrombocytopenia  - heparin /  coumadin bridge  - care discussed with Dr. Isabel     * Atrial fibrillation  - coumadin / heparin  - INR goal 2-3    # Smoldering myeloma  - heme following    # Primary peritoneal carcinoma metastatic  # ? Spleenic infarct  - right sided chest port  - heme following

## 2019-12-13 NOTE — PROCEDURE NOTE - NSPROCDETAILS_GEN_ALL_CORE
ultrasound assessment of effusion (localization)/location identified, draped/prepped, sterile technique used, needle inserted/introduced

## 2019-12-14 LAB
APTT BLD: 192 SEC — CRITICAL HIGH (ref 27.5–36.3)
CULTURE RESULTS: SIGNIFICANT CHANGE UP
CULTURE RESULTS: SIGNIFICANT CHANGE UP
HCT VFR BLD CALC: 40.9 % — SIGNIFICANT CHANGE UP (ref 34.5–45)
HGB BLD-MCNC: 11.8 G/DL — SIGNIFICANT CHANGE UP (ref 11.5–15.5)
MCHC RBC-ENTMCNC: 28.9 GM/DL — LOW (ref 32–36)
MCHC RBC-ENTMCNC: 29.1 PG — SIGNIFICANT CHANGE UP (ref 27–34)
MCV RBC AUTO: 101 FL — HIGH (ref 80–100)
PLATELET # BLD AUTO: 81 K/UL — LOW (ref 150–400)
RBC # BLD: 4.05 M/UL — SIGNIFICANT CHANGE UP (ref 3.8–5.2)
RBC # FLD: 18.2 % — HIGH (ref 10.3–14.5)
SPECIMEN SOURCE: SIGNIFICANT CHANGE UP
SPECIMEN SOURCE: SIGNIFICANT CHANGE UP
TSH SERPL-MCNC: 12.1 UU/ML — HIGH (ref 0.34–4.82)
WBC # BLD: 29.73 K/UL — HIGH (ref 3.8–10.5)
WBC # FLD AUTO: 29.73 K/UL — HIGH (ref 3.8–10.5)

## 2019-12-14 PROCEDURE — 76770 US EXAM ABDO BACK WALL COMP: CPT | Mod: 26

## 2019-12-14 PROCEDURE — 99231 SBSQ HOSP IP/OBS SF/LOW 25: CPT

## 2019-12-14 PROCEDURE — 99232 SBSQ HOSP IP/OBS MODERATE 35: CPT

## 2019-12-14 PROCEDURE — 93306 TTE W/DOPPLER COMPLETE: CPT | Mod: 26

## 2019-12-14 RX ORDER — APIXABAN 2.5 MG/1
5 TABLET, FILM COATED ORAL EVERY 12 HOURS
Refills: 0 | Status: CANCELLED | OUTPATIENT
Start: 2019-12-22 | End: 2019-12-17

## 2019-12-14 RX ORDER — SODIUM CHLORIDE 9 MG/ML
500 INJECTION INTRAMUSCULAR; INTRAVENOUS; SUBCUTANEOUS ONCE
Refills: 0 | Status: COMPLETED | OUTPATIENT
Start: 2019-12-14 | End: 2019-12-14

## 2019-12-14 RX ORDER — APIXABAN 2.5 MG/1
10 TABLET, FILM COATED ORAL EVERY 12 HOURS
Refills: 0 | Status: DISCONTINUED | OUTPATIENT
Start: 2019-12-14 | End: 2019-12-17

## 2019-12-14 RX ORDER — SODIUM CHLORIDE 9 MG/ML
500 INJECTION, SOLUTION INTRAVENOUS ONCE
Refills: 0 | Status: COMPLETED | OUTPATIENT
Start: 2019-12-14 | End: 2019-12-14

## 2019-12-14 RX ADMIN — HEPARIN SODIUM 0 UNIT(S)/HR: 5000 INJECTION INTRAVENOUS; SUBCUTANEOUS at 02:33

## 2019-12-14 RX ADMIN — Medication 0.5 MILLIGRAM(S): at 13:59

## 2019-12-14 RX ADMIN — Medication 1 MILLIGRAM(S): at 05:16

## 2019-12-14 RX ADMIN — APIXABAN 10 MILLIGRAM(S): 2.5 TABLET, FILM COATED ORAL at 17:18

## 2019-12-14 RX ADMIN — Medication 1 MILLIGRAM(S): at 17:18

## 2019-12-14 RX ADMIN — SODIUM CHLORIDE 1000 MILLILITER(S): 9 INJECTION INTRAMUSCULAR; INTRAVENOUS; SUBCUTANEOUS at 22:52

## 2019-12-14 RX ADMIN — HEPARIN SODIUM 900 UNIT(S)/HR: 5000 INJECTION INTRAVENOUS; SUBCUTANEOUS at 03:38

## 2019-12-14 RX ADMIN — OXYCODONE HYDROCHLORIDE 5 MILLIGRAM(S): 5 TABLET ORAL at 18:30

## 2019-12-14 RX ADMIN — Medication 325 MILLIGRAM(S): at 05:19

## 2019-12-14 RX ADMIN — Medication 1 MILLIGRAM(S): at 13:59

## 2019-12-14 RX ADMIN — MEROPENEM 100 MILLIGRAM(S): 1 INJECTION INTRAVENOUS at 14:00

## 2019-12-14 RX ADMIN — OXYCODONE HYDROCHLORIDE 5 MILLIGRAM(S): 5 TABLET ORAL at 17:17

## 2019-12-14 RX ADMIN — SODIUM CHLORIDE 500 MILLILITER(S): 9 INJECTION, SOLUTION INTRAVENOUS at 21:39

## 2019-12-14 RX ADMIN — Medication 100 MILLIGRAM(S): at 18:31

## 2019-12-14 RX ADMIN — Medication 25 MILLIGRAM(S): at 17:18

## 2019-12-14 RX ADMIN — PANTOPRAZOLE SODIUM 40 MILLIGRAM(S): 20 TABLET, DELAYED RELEASE ORAL at 05:17

## 2019-12-14 RX ADMIN — SERTRALINE 100 MILLIGRAM(S): 25 TABLET, FILM COATED ORAL at 13:57

## 2019-12-14 RX ADMIN — Medication 100 MILLIGRAM(S): at 05:17

## 2019-12-14 RX ADMIN — OXYCODONE HYDROCHLORIDE 5 MILLIGRAM(S): 5 TABLET ORAL at 05:17

## 2019-12-14 RX ADMIN — Medication 325 MILLIGRAM(S): at 17:18

## 2019-12-14 NOTE — PROGRESS NOTE ADULT - SUBJECTIVE AND OBJECTIVE BOX
85 y/o woman with a history of HTN, HLD, "arrythmia" (details not known), CKD, GERD, who presented to the ER from her assisted living facility on 12/9/19 for the evaluation of shortness of breath, abdominal pain, recent fall, generalized weakness -- subsequently diagnosed with pneumonia associated with pleural effusions, leukocytosis.  Patient is a poor informant and unable to provide any details of her illness.  She says, "I don't know how I feel" and "they say I have pneumonia."  Unable to identify exacerbating or alleviating factors.  Cardiology consult requested due to an episode of AF starting on 12/11/19.  Mrs. Wallace says she had an "irregular heart" but it "went away."  She denies experiencing palpitations and angina.    12/13/19: contacted family they deny any prior history of afib.  Pt w/o complaints, but has cognitive deficits.  tele w/ NSR no afib recurrence since yesterday.  12/14/19: Alert but confused Tele SR     MEDICATIONS  (STANDING):  atorvastatin 10 milliGRAM(s) Oral at bedtime  clonazePAM  Tablet 1 milliGRAM(s) Oral two times a day  clonazePAM  Tablet 0.5 milliGRAM(s) Oral daily  doxycycline hyclate Capsule 100 milliGRAM(s) Oral every 12 hours  ferrous    sulfate 325 milliGRAM(s) Oral two times a day  folic acid 1 milliGRAM(s) Oral daily  heparin  Infusion.  Unit(s)/Hr (11 mL/Hr) IV Continuous <Continuous>  lactated ringers. 1000 milliLiter(s) (70 mL/Hr) IV Continuous <Continuous>  melatonin 3 milliGRAM(s) Oral at bedtime  meropenem  IVPB 500 milliGRAM(s) IV Intermittent every 12 hours  metoprolol tartrate 25 milliGRAM(s) Oral two times a day  mirtazapine 7.5 milliGRAM(s) Oral at bedtime  oxyCODONE    IR 5 milliGRAM(s) Oral two times a day  pantoprazole    Tablet 40 milliGRAM(s) Oral before breakfast  sertraline 100 milliGRAM(s) Oral daily    MEDICATIONS  (PRN):  acetaminophen   Tablet .. 1000 milliGRAM(s) Oral every 12 hours PRN Moderate Pain (4 - 6)  heparin  Injectable 4500 Unit(s) IV Push every 6 hours PRN For aPTT less than 40  heparin  Injectable 2000 Unit(s) IV Push every 6 hours PRN For aPTT between 40 - 57  zolpidem 5 milliGRAM(s) Oral at bedtime PRN Insomnia  zolpidem 5 milliGRAM(s) Oral at bedtime PRN Insomnia      Vital Signs Last 24 Hrs  T(C): 36.4 (14 Dec 2019 05:09), Max: 36.4 (13 Dec 2019 20:59)  T(F): 97.5 (14 Dec 2019 05:09), Max: 97.5 (13 Dec 2019 20:59)  HR: 106 (14 Dec 2019 05:09) (85 - 115)  BP: 95/60 (14 Dec 2019 05:09) (92/53 - 101/57)  BP(mean): --  RR: 16 (14 Dec 2019 05:09) (16 - 19)  SpO2: 95% (14 Dec 2019 05:09) (94% - 95%)    I&O's Summary      PHYSICAL EXAM:    Constitutional: NAD, awake   Neck:  supple,  No JVD  Respiratory: Breath sounds are clear bilaterally  Cardiovascular: S1 and S2, regular rate and rhythm  Gastrointestinal: + distention soft non tender  Extremities: No peripheral edema. No clubbing or cyanosis.  Vascular: 2+ peripheral pulses  Musculoskeletal: no calf tenderness.  Skin: No rashes.      LABS: All Labs Reviewed:                        9.7    24.54 )-----------( 79       ( 13 Dec 2019 07:35 )             33.8                         9.7    18.64 )-----------( 76       ( 11 Dec 2019 07:20 )             33.0     13 Dec 2019 07:35    145    |  122    |  70     ----------------------------<  110    4.3     |  15     |  2.16   12 Dec 2019 07:52    147    |  121    |  70     ----------------------------<  110    4.4     |  17     |  2.12   11 Dec 2019 07:20    144    |  120    |  72     ----------------------------<  93     4.3     |  16     |  2.26     Ca    8.1        13 Dec 2019 07:35  Ca    7.6        12 Dec 2019 07:52  Ca    7.0        11 Dec 2019 07:20      PT/INR - ( 13 Dec 2019 07:42 )   PT: 20.1 sec;   INR: 1.78 ratio         PTT - ( 13 Dec 2019 07:42 )  PTT:36.1 sec

## 2019-12-14 NOTE — CONSULT NOTE ADULT - ASSESSMENT
A: 85 yo female with splenic infarct, smouldering myeloma, peritoneal carcinoma, thrombocytopenia with high vte risk.        P:  d/c hep gtt at 1600   start eliquis 10 mg po every 12 hours [id number], risk of VTE with present splenic infarct and CA is higher than the risk of bleed  Eliquis efficacious in the use in pts with + VTE and Ca   monitor CBC/BMP    Thank you for the consult, will follow
INPATIENT CONSULTATION  REQUESTING PHYSICIAN: Dr. Muñoz  REASON FOR CONSULT: Primary peritoneal carcinoma/smoldering myeloma  HISTORY OF PRESENT ILLNESS: This is a 86-year-old female with a history of smoldering multiple myeloma further complicated by recently back in April 2019 diagnosed with peritoneal carcinoma for which she was severely symptomatic due to omental progression and abdominal pain.  Patient was given low-dose weekly carboplatin and Taxol for which the patient had a phenomenal response in her overall performance status as well as activity level improved.  The patient had been taken off of systemic therapy as of August 2019 due to patient's decreased tolerance of systemic therapy.  Patient since then has been following closely anywhere from every 2 to every 3 weeks.  The patient has had approximately 2 recurrent urinary tract infections for which she had been on antibiotics and presents today with recurrent UTI.  Patient is currently on antibiotics.  During today's evaluation she notes pain localized to the lower abdominal region.  On exam patient has a little phenomenal palpable nodule or mass which has been chronic and persistent.    PAST MEDICAL HISTORY:    1.  Smoldering myeloma  2.  Primary peritoneal carcinoma metastatic  MEDICATIONS: As per EMR patient on antibiotics  ALLERGIES: No known drug allergies  SOCIAL HISTORY: Patient does not smoke or drink  FAMILY HISTORY: Patient has no primary relatives familial history of cancer  REVIEW OF SYSTEMS: Patient reports pain localized to the lower abdominal wall denies any additional pain  All remaining systems were reviewed and were negative  PHYSICAL EXAMINATION: Patient in no acute distress appears to be pleasantly confused anal ×2  No appreciable cervical adenopathy chest was clear to auscultation anteriorly bowel sounds are present nontender on examination no lower extremity edema right upper extremity with extensive ecchymoses  ADDITIONAL DATA: Not applicable  ASSESSMENT: This is an 86-year-old female here for UTI severe sepsis further complicated by history of smoldering multiple myeloma as well as a more recent active diagnosis of primary peritoneal carcinoma treated with weekly low dose carbotaxol with symptomatic.    PLAN:    1.  During today's this evening's evaluation had an extensive discussion with the patient that given my concern regarding her overall disease progression her abdominal symptoms may not be secondary to acute infection but may be complicated by overall disease progression.  Given these concerns we will send off for tomorrow to evaluate current status of disease.  Office the patient was noted to have an up trending tumor marker  I would recommend for now continue supportive care.  The patient's present performance status is not presently amendable to systemic therapy may be altered by acute infection.  As such would recommend treating empirically with antibiotics and evaluating for overall recovery.  The  Patient may be a candidate for low-dose Taxol if the patient continues to have ongoing abdominal symptoms in which both the patient myself the son and the daughter have had several discussions regarding the goals of therapy being minimization of overall symptom disease burden pain as well as abdominal  discomfort.
PROBLEMS:    CAP-right lower lobe consolidation and small right parapneumonic effusion- persistant elevated wbc CT chest to evaluate effusion may need thoracentesis to evalue empyema  UTI-e-coli  DAMON on CKD stage 2- prerenal   leukocytosis / anemia /  thrombocytopenia    PLAN:    persistant elevated wbc CT chest to evaluate effusion may need thoracentesis to evalue empyema  IV Rhocephin/doxycylin  supportive care  dvt prophylasix
Pt is a 84y old Female with hx of  HTN, HLD, arrythmia, CKD II presenting to the ED via EMS form [facility] c/o SOB and  weakness, decreased appetitite. Per EMS, pt reported that she fell out of bed twice within the past 2 days, but did not fall today. Patient is a poor historian. Admitted for pneumonia and UTI- started on IV antibiotics.     1) Sepsis   - Pneumonia vs. para-pneumonic effusion vs empyema  - ID consult appreciated   - CT chest showed moderate bilateral pleural effusion, large amount of ascites and bilateral lower lobe opacities compatible with atelectasis and probable superimposed pneumonia.  - CT surgery consult appreciated   - s/p thoracentesis on 12/13/2019   - Pulmonary consult Appreciated     2) Severe Protein malnutrition   - Failure to thrive   - albumin 2.2   - Registered Dietician consult appreciated     3) UTI   - Continue Abx as per ID     4) Smoldering Myeloma   - patient received chemo with Dr. Isabel   - oncology consult appreciated   - family aware metastatic disease     5) advanced care planning   - Patient lack capacity   - Will check on content for HCP paperwork - patients son and daughter listed in chart   - FULL code at this time   - Left message for daughter and got disconnected from son - trying to arrange GOC meeting for Monday 12/16
Pt is an 83 y/o F with PMHx of HTN, HLD, arrythmia, CKD II admitted from assisted living on 12/9 for evaluation of shortness of breath and weakness; patient is unclear as to why she is in hospital; history per medical record as patient is poor historian. She gives history of penicillin allergy over 50 years ago and she does not recall her reaction.     1. Patient admitted with pneumonia which will treat as community acquired pneumonia; also noted with leukocytosis most likely reactive to infection  - follow up cultures   - oxygen and nebs as needed   - serial cbc and monitor temperature   - reviewed prior medical records to evaluate for resistant or atypical pathogens   - iv hydration and supportive care   - will optimize antibiotics to ceftriaxone which patient should tolerate given that penicillin allergy is remote and most remote penicillin allergies are due to cogeners in old formulations of penicillin  - Monitor closely in view of history of penicillin allergy   - will add doxycycline to cover atypical and resistant pathogens  2. other issues: per medicine
84 HTN, HLD, arrythmia, CKD III presenting to the ED via EMS form [facility] c/o SOB and  weakness, decreased appetitive with renal evaluation of DAMON.     DAMON  -Suspect pre-renal with poor intake and diuretic use, stabilzing with IVF as well  -Maintain IVF, can lower rate  -Renal imaging if function does not continue to normalize  -Maintain MAP, optimize intake    CKD  -baseline near 1.5 mg/dl  -NSAID avoidance    FTT/Poor intake  -F/u cultures  -Dietary optimization as able    Thanks, will follow with you

## 2019-12-14 NOTE — PROGRESS NOTE ADULT - SUBJECTIVE AND OBJECTIVE BOX
Date of service: 12-14-19 @ 12:57      Patient lying in bed; lethargic but arousable  Afebrile    ROS unable to obtain secondary to patient medical condition     MEDICATIONS  (STANDING):  apixaban 10 milliGRAM(s) Oral every 12 hours  atorvastatin 10 milliGRAM(s) Oral at bedtime  clonazePAM  Tablet 1 milliGRAM(s) Oral two times a day  clonazePAM  Tablet 0.5 milliGRAM(s) Oral daily  doxycycline hyclate Capsule 100 milliGRAM(s) Oral every 12 hours  ferrous    sulfate 325 milliGRAM(s) Oral two times a day  folic acid 1 milliGRAM(s) Oral daily  heparin  Infusion.  Unit(s)/Hr (11 mL/Hr) IV Continuous <Continuous>  lactated ringers. 1000 milliLiter(s) (70 mL/Hr) IV Continuous <Continuous>  melatonin 3 milliGRAM(s) Oral at bedtime  meropenem  IVPB 500 milliGRAM(s) IV Intermittent every 12 hours  metoprolol tartrate 25 milliGRAM(s) Oral two times a day  mirtazapine 7.5 milliGRAM(s) Oral at bedtime  oxyCODONE    IR 5 milliGRAM(s) Oral two times a day  pantoprazole    Tablet 40 milliGRAM(s) Oral before breakfast  sertraline 100 milliGRAM(s) Oral daily    MEDICATIONS  (PRN):  acetaminophen   Tablet .. 1000 milliGRAM(s) Oral every 12 hours PRN Moderate Pain (4 - 6)  heparin  Injectable 4500 Unit(s) IV Push every 6 hours PRN For aPTT less than 40  heparin  Injectable 2000 Unit(s) IV Push every 6 hours PRN For aPTT between 40 - 57  zolpidem 5 milliGRAM(s) Oral at bedtime PRN Insomnia  zolpidem 5 milliGRAM(s) Oral at bedtime PRN Insomnia      Vital Signs Last 24 Hrs  T(C): 36.4 (14 Dec 2019 05:09), Max: 36.4 (13 Dec 2019 20:59)  T(F): 97.5 (14 Dec 2019 05:09), Max: 97.5 (13 Dec 2019 20:59)  HR: 106 (14 Dec 2019 05:09) (102 - 115)  BP: 95/60 (14 Dec 2019 05:09) (92/53 - 101/53)  BP(mean): --  RR: 16 (14 Dec 2019 05:09) (16 - 18)  SpO2: 95% (14 Dec 2019 05:09) (95% - 95%)    Physical Exam:        PE:    Constitutional: frail looking  HEENT: NC/AT, EOMI, PERRLA, conjunctivae clear; ears and nose atraumatic; pharynx clear  Neck: supple; thyroid not palpable  Back: no tenderness  Respiratory: respiratory effort normal; scattered coarse breath sounds  Cardiovascular: S1S2 regular, no murmurs  Abdomen: soft, not tender, not distended, positive BS; no liver or spleen organomegaly  Genitourinary: no suprapubic tenderness  Musculoskeletal: no muscle tenderness, no joint swelling or tenderness  Neurological/ Psychiatric:   moving all extremities  Skin: no rashes; no palpable lesions    Labs: all available labs reviewed                 Labs:                        11.8   29.73 )-----------( 81       ( 14 Dec 2019 01:42 )             40.9     12-13    145  |  122<H>  |  70<H>  ----------------------------<  110<H>  4.3   |  15<L>  |  2.16<H>    Ca    8.1<L>      13 Dec 2019 07:35             Cultures:       Culture - Body Fluid with Gram Stain (collected 12-13-19 @ 09:45)  Source: .Body Fluid Pleural Fluid  Gram Stain (12-13-19 @ 19:34):    polymorphonuclear leukocytes per low power field    No organisms seen    by cytocentrifuge  Preliminary Report (12-14-19 @ 12:50):    No growth    Culture - Acid Fast - Body Fluid w/Smear (collected 12-13-19 @ 09:45)  Source: .Body Fluid Pleural Fluid    Culture - Blood (collected 12-09-19 @ 13:47)  Source: .Blood None  Preliminary Report (12-10-19 @ 18:01):    No growth to date.    Culture - Blood (collected 12-09-19 @ 13:38)  Source: .Blood None  Preliminary Report (12-10-19 @ 18:01):    No growth to date.    Culture - Urine (collected 12-09-19 @ 12:51)  Source: .Urine None  Final Report (12-11-19 @ 17:50):    >100,000 CFU/ml Escherichia coli  Organism: Escherichia coli (12-11-19 @ 17:50)  Organism: Escherichia coli (12-11-19 @ 17:50)      -  Amikacin: S <=16      -  Ampicillin: R >16 These ampicillin results predict results for amoxicillin      -  Ampicillin/Sulbactam: I 16/8 Enterobacter, Citrobacter, and Serratia may develop resistance during prolonged therapy (3-4 days)      -  Aztreonam: S <=4      -  Cefazolin: R >16 (MIC_CL_[id number]) For uncomplicated UTI with K. pneumoniae, E. coli, or P. mirablis: CORBY <=16 is sensitive and CORBY >=32 is resistant. This also predicts results for oral agents cefaclor, cefdinir, cefpodoxime, cefprozil, cefuroxime axetil, cephalexin and locarbef for uncomplicated UTI. Note that some isolates may be susceptible to these agents while testing resistant to cefazolin.      -  Cefepime: S <=4      -  Cefoxitin: R >16      -  Ceftriaxone: S <=1 Enterobacter, Citrobacter, and Serratia may develop resistance during prolonged therapy      -  Ciprofloxacin: R >2      -  Gentamicin: R >8      -  Imipenem: S <=1      -  Levofloxacin: R >4      -  Meropenem: S <=1      -  Nitrofurantoin: R >64 Should not be used to treat pyelonephritis      -  Piperacillin/Tazobactam: S <=16      -  Tigecycline: S <=2      -  Tobramycin: I 8      -  Trimethoprim/Sulfamethoxazole: R >2/38      Method Type: CORBY        Cell Count, Body Fluid (12.13.19 @ 09:45)    Other Body Cells: 0 %    Mesothelial Cells - Body Fluid: 5 %    Eosinophil Count - Body Fluid: 0 %    Fluid Type: Other    Body Fluid Appearance: Clear    BF Lymphocytes: 20 %    Atypical Lymphocytes - Body Fluid: 0 %    Nucleated Red Blood Cells - Body Fluid: 0    Monocyte/Macrophage Count - Body Fluid: 49 %    Fluid Segmented Granulocytes: 26 %    Tube Type: Lavendar    Color - Body Fluid: Yellow    Total Nucleated Cell Count, Body Fluid: 452: Peritoneal/Pleural/ Pericardial  Body Fluid Types            Total Nucleated cells: <500/uL            Neutrophils: <25%          Synovial Body Fluid Type           Total Nucleated cells: <150/uL           Neutrophils: <25%           Lymphocytes: <75%           Moncytes/Macrophages: </=70% /uL    Total RBC Count: 3000 /uL                < from: Xray Chest 1 View AP/PA. (12.09.19 @ 11:45) >    EXAM:  XR CHEST 1 VIEW                            PROCEDURE DATE:  12/09/2019          INTERPRETATION:  Short of breath.    AP chest. Prior 6/17/2019.    Chin obscures portion right apex. Low lung volumes. No change heart   mediastinum. Right chestport reidentified in situ. There is new focal   consolidation the right lower lobe likely representing pneumonia and   atelectasis in the appropriate clinical setting. New small right pleural   effusion.    Impression: Right lower lobe consolidation and small right parapneumonic   effusion      < end of copied text >          Radiology: all available radiological tests reviewed    Advanced directives addressed: full resuscitation

## 2019-12-14 NOTE — CONSULT NOTE ADULT - PROVIDER SPECIALTY LIST ADULT
Nephrology
Thoracic Surgery
Anticoag Management
Heme/Onc
Infectious Disease
Pulmonology
Palliative Care
Cardiology

## 2019-12-14 NOTE — CONSULT NOTE ADULT - REASON FOR ADMISSION
Pneumonia with parapneumonic effusion  UTI  ARF  [facility]  sepsis

## 2019-12-14 NOTE — PROGRESS NOTE ADULT - ASSESSMENT
PROBLEMS:    CAP-right lower lobe consolidation and small right parapneumonic effusion- persistant elevated wbc CT chest to evaluate effusion may need thoracentesis to evalue empyema  UTI-e-coli  DAMON on CKD stage 2- prerenal   leukocytosis / anemia /  thrombocytopenia    PLAN:    pulmonary unchanged  IV meropenem/doxycylin  supportive care  D/w DR Hein & CT surgery  dvt prophylasix

## 2019-12-14 NOTE — PROGRESS NOTE ADULT - ASSESSMENT
Pt is an 83 y/o F with PMHx of HTN, HLD, arrythmia, CKD II admitted from assisted living on 12/9 for evaluation of shortness of breath and weakness; patient is unclear as to why she is in hospital; history per medical record as patient is poor historian. She gives history of penicillin allergy over 50 years ago and she does not recall her reaction.     1. Patient admitted with pneumonia which will treat as community acquired pneumonia; also noted with leukocytosis most likely reactive to infection  - follow up cultures   - oxygen and nebs as needed   - serial cbc and monitor temperature   - reviewed prior medical records to evaluate for resistant or atypical pathogens   - iv hydration and supportive care   - day #5  doxycycline and day #3 meropenem  - parapneumonic fluid drained, transudative appearing  - tolerating antibiotics without rashes or side effects   - Monitor closely in view of history of penicillin allergy   2. other issues: per medicine

## 2019-12-14 NOTE — PROGRESS NOTE PEDS - ASSESSMENT
d/w son  ps declining  hospice appropriate  pall meeting on monday   recommend supportive care   discussed poor prognosis given brisk disease recurrence and PS

## 2019-12-14 NOTE — PROGRESS NOTE ADULT - PROBLEM SELECTOR PLAN 1
Presently in SR with rates  continue BB with parameters and consider uptitrating beta blockers if pressure allows Continue Heparin/coumadin bridge carefully monitor for signs of bleeding.  Echo ordered, TSH 12.1 advise medical management.  Consider digoxin for AF rate control

## 2019-12-14 NOTE — PROGRESS NOTE ADULT - SUBJECTIVE AND OBJECTIVE BOX
Patient is a 85 y/o F with PMHx of HTN, HLD, arrythmia, CKD II presenting to the ED via EMS form [facility] c/o SOB and  weakness, decreased appetitite. Per EMS, pt reported that she fell out of bed twice within the past 2 days, but did not fall today. Patient is a poor historian. Admitted for pneumonia and UTI- started on IV antibiotics.     12/13: Patient was seen and examined. S/p thoracenetesis with CT surgery- tolerated procedure well. Patient complaining of pain on procedure site. denies chest pain, fever or chills. Care discussed with Dr. Jurado and Dr. camacho. Started heparin /  coumadin bridge.      12/14: very deconditioned and frail. Sick appearing. pleural fluid results reviewed. guarded prognosis. NO diarrhea.     REVIEW OF SYSTEMS:  CONSTITUTIONAL: + weakness  EYES/ENT: No visual changes;  No vertigo or throat pain   NECK: No pain or stiffness  RESPIRATORY: No cough, wheezing, hemoptysis; No shortness of breath  CARDIOVASCULAR: Abdominal distention  GENITOURINARY: No dysuria, frequency or hematuria  NEUROLOGICAL: No numbness or weakness  SKIN: No itching, burning, rashes, or lesions   All other review of systems is negative unless indicated above.    PE:  Constitutional: NAD, laying in bed, sick, deconditioned and frail  HEENT: NC/AT  Back: no tenderness  Respiratory: respirations even and non labored, coarse breath sounds throughout lung fields.   Cardiovascular: S1S2 regular, no murmurs  Abdomen: soft, not tender, + distended,  Genitourinary: voiding  Rectal: deferred  Musculoskeletal: no muscle tenderness, no joint swelling or tenderness                                  INTERPRETATION:  Short of breath.    AP chest. Prior 6/17/2019.    Chin obscures portion right apex. Low lung volumes. No change heart   mediastinum. Right chestport reidentified in situ. There is new focal   consolidation the right lower lobe likely representing pneumonia and   atelectasis in the appropriate clinical setting. New small right pleural   effusion.    Impression: Right lower lobe consolidation and small right parapneumonic   effusion

## 2019-12-14 NOTE — CONSULT NOTE ADULT - SUBJECTIVE AND OBJECTIVE BOX
HPI:  Pt is an 83 y/o F with PMHx of HTN, HLD, arrythmia, CKD II presenting to the ED on 12/9 via EMS form [facility] c/o SOB and  weakness, decreased appetite . workup revealed B/L PNA< Pleural effusions is now S/P Thoracentesis, pt has h/p smouldering myeloma, is thrombocytopenic and primary peritoneal carcinoma and is S/P Chemo .  Chest abd reported splenic infarct so we are consulted for AC recommendations and  management.               Fam HX:   unavailable (09 Dec 2019 14:51)      Patient is a 84y old  Female who presents with a chief complaint of Pneumonia with parapneumonic effusion  UTI  ARF  [facility]  sepsis (14 Dec 2019 12:56)      Consulted by Dr. Ewing   for VTE prophylaxis, risk stratification, and anticoagulation management.    PAST MEDICAL & SURGICAL HISTORY:  Chronic kidney disease  GERD (gastroesophageal reflux disease)  Urinary incontinence  Diarrhea  Osteoporosis  Obesity  Hyperlipidemia  Hypertension  Abdominal pain  Anxiety  Anemia  Myeloma  Nephrolithiasis  S/P cataract surgery: b/l  S/P colonoscopy  S/P tonsillectomy  Encounter for cholecystectomy  Abnormal biopsy result: abdomen May 2019  H/O total hip arthroplasty, bilateral  History of bilateral knee arthroplasty  H/O total hysterectomy  History of hernia repair          CrCl: 18    IMPROVE VTE Risk Score:IMPROVE VTE Individual Risk Assessment          RISK                                                          Points  [ x ] Previous VTE                                                3  [  ] Thrombophilia                                             2  [  ] Lower limb paralysis                                   2        (unable to hold up >15 seconds)    [  x] Current Cancer                                             2         (within 6 months)  [  ] Immobilization > 24 hrs                              1  [  ] ICU/CCU stay > 24 hours                             1  [x  ] Age > 60                                                         1    IMPROVE VTE Score:         [  8       ]    Total Risk Factor Score:    0 - 1:   Consider IPC  >2 - 3:  Thromboprophylaxis required (enoxaparin or SQ heparin)        >4:   High Risk: Thromboprophylaxis required (enoxaparin or SQ heparin), optional add IPC  **If CONTRAINDICATION to enoxaparin or SQ heparin, USE IPCs**            IMPROVE Bleeding Risk Score : 4      Time In:   Time Out:     Falls Risk:   High (x  )  Mod (  )  Low (  )      FAMILY HISTORY:    Denies any personal or familial history of clotting or bleeding disorders.    Allergies    Erythromycin Base (Other)  penicillin (Rash)    Intolerances        REVIEW OF SYSTEMS    (  )Fever	     (  )Constipation	(  )SOB				(  )Headache	(  )Dysuria  (  )Chills	     (  )Melena	(  )Dyspnea present on exertion	                    (  )Dizziness                    (  )Polyuria  (  )Nausea	     (  )Hematochezia	(  )Cough			                    (  )Syncope   	(  )Hematuria  (  )Vomiting    (  )Chest Pain	(  )Wheezing			(  )Weakness  (  )Diarrhea     (  )Palpitations	(  )Anorexia			( x )joint pain    All  other review of systems negative: Yes    Vital Signs Last 24 Hrs  T(C): 36.3 (12-14-19 @ 14:06), Max: 36.4 (12-13-19 @ 20:59)  T(F): 97.4 (12-14-19 @ 14:06), Max: 97.5 (12-13-19 @ 20:59)  HR: 126 (12-14-19 @ 14:06) (102 - 126)  BP: 102/74 (12-14-19 @ 14:06) (92/53 - 102/74)  BP(mean): --  RR: 19 (12-14-19 @ 14:06) (16 - 19)  SpO2: 97% (12-14-19 @ 14:06) (95% - 97%)      PHYSICAL EXAM:    Constitutional: very ill aappearing    Neurological:somnolent    Skin: Warm    Respiratory and Thorax: normal effort; Breath sounds: normal; BLB crackles  	  Cardiovascular: S1, S2, regular, NMBR	    Gastrointestinal: BS + x 4Q, nontender	    Genitourinary:  Bladder nondistended, nontender    Musculoskeletal:   General Right:   no muscle/joint tenderness,   normal tone, no joint swelling,   ROM: limited	    General Left:   no muscle/joint tenderness,   normal tone, no joint swelling,   ROM: limited      Lower extrems:   Right: no calf tenderness              negative zee's sign               + pedal pulses    Left:   no calf tenderness              negative zee's sign               + pedal pulses                          11.8   29.73 )-----------( 81       ( 14 Dec 2019 01:42 )             40.9       12-13    145  |  122<H>  |  70<H>  ----------------------------<  110<H>  4.3   |  15<L>  |  2.16<H>    Ca    8.1<L>      13 Dec 2019 07:35        PT/INR - ( 13 Dec 2019 07:42 )   PT: 20.1 sec;   INR: 1.78 ratio         PTT - ( 14 Dec 2019 01:42 )  PTT:192.0 sec				          CT Chest/abd pelvis    FINDINGS:    LUNGS AND AIRWAYS: Patent central airways.  Bilateral lower lobe   compressive and dependent atelectasis.    PLEURA: Moderate right and small left pleural effusions.    MEDIASTINUM AND CASTILLO: No lymphadenopathy.    VESSELS: Right-sided central venous port with tip in the superior vena   cava. Atherosclerotic arterial calcifications, including diffuse coronary   artery calcification.    HEART: Heart size is normal. No pericardial effusion.    CHEST WALL AND LOWER NECK: Within normal limits.    VISUALIZED UPPER ABDOMEN: Large amount of ascites. Linear low density   region in the spleen, new from prior study. 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   Cholecystectomy. Nonobstructing right renal stones.    BONES: Left shoulder arthroplasty. No acute bony abnormality.    IMPRESSION:     Moderate bilateral pleural effusions.  Large amount of ascites.  Bilateral lower lobe opacities compatible with atelectasis. Superimposed   pneumonia not excluded.  Low-density region in the spleen suggestive of infarction.    Ct head:      The brain demonstrates mild periventricular and bifrontal biparietal   subcortical white matter ischemia is noted.   No acute cerebral cortical   infarct is seen.  No intracranial hemorrhage is found.  No mass effect is   found in the brain.      The ventricles, sulci and basal cisterns appear unremarkable.    The orbits are unremarkable.  The paranasal sinuses are clear.  The nasal   cavity appears intact.  The nasopharynx is symmetric.  The central skull   base, petrous temporal bones and calvarium remain intact.      IMPRESSION:   mild periventricular and bifrontal biparietal subcortical   white matter ischemia is noted          MEDICATIONS  (STANDING):  apixaban 10 milliGRAM(s) Oral every 12 hours  atorvastatin 10 milliGRAM(s) Oral at bedtime  clonazePAM  Tablet 1 milliGRAM(s) Oral two times a day  clonazePAM  Tablet 0.5 milliGRAM(s) Oral daily  doxycycline hyclate Capsule 100 milliGRAM(s) Oral every 12 hours  ferrous    sulfate 325 milliGRAM(s) Oral two times a day  folic acid 1 milliGRAM(s) Oral daily  lactated ringers. 1000 milliLiter(s) IV Continuous <Continuous>  melatonin 3 milliGRAM(s) Oral at bedtime  meropenem  IVPB 500 milliGRAM(s) IV Intermittent every 12 hours  metoprolol tartrate 25 milliGRAM(s) Oral two times a day  mirtazapine 7.5 milliGRAM(s) Oral at bedtime  oxyCODONE    IR 5 milliGRAM(s) Oral two times a day  pantoprazole    Tablet 40 milliGRAM(s) Oral before breakfast  sertraline 100 milliGRAM(s) Oral daily          DVT Prophylaxis:  LMWH                   (  )  Heparin SQ           (  )  Coumadin             (  )  Xarelto                  (  )  Eliquis                   (  )  Venodynes           (  )  Ambulation          (  )  UFH                       (  )  Contraindicated  (  )  EC ASPIRIN       (  )

## 2019-12-14 NOTE — PROGRESS NOTE ADULT - SUBJECTIVE AND OBJECTIVE BOX
Subjective:    pat s/p thoracentesis, fluid transudative, wbc 29 elevated.    Home Medications:  aspirin 325 mg oral delayed release tablet: 1 tab(s) orally once a day (09 Dec 2019 18:37)  atorvastatin 10 mg oral tablet: 1 tab(s) orally once a day (09 Dec 2019 18:37)  Boniva 150 mg oral tablet: 1 tab(s) orally once a month on the 3rd day (09 Dec 2019 18:37)  Caltrate 600 + D oral tablet: 1 tab(s) orally 2 times a day (09 Dec 2019 18:37)  clonazePAM 1 mg oral tablet: 1 tab(s) orally 2 times a day at 6 am and 10pm (09 Dec 2019 18:37)  clonazePAM 1 mg oral tablet: 0.5 tab(s) orally once a day at 2pm (hold for sedation) (09 Dec 2019 18:37)  ferrous sulfate 325 mg (65 mg elemental iron) oral tablet: 1 tab(s) orally 3 times a day (09 Dec 2019 18:37)  folic acid 1 mg oral tablet: 1 tab(s) orally once a day (09 Dec 2019 18:37)  loperamide 2 mg oral capsule: 1 cap(s) orally every 8 hours, As Needed - for diarrhea (09 Dec 2019 18:37)  Melatonin 3 mg oral tablet: 2 tab(s) orally once a day (at bedtime) (09 Dec 2019 18:37)  Metoprolol Succinate  mg oral tablet, extended release: 1 tab(s) orally once a day (09 Dec 2019 18:37)  mirtazapine 7.5 mg oral tablet: 1 tab(s) orally once a day (at bedtime) (09 Dec 2019 18:37)  Multiple Vitamins oral tablet: 1 tab(s) orally once a day (09 Dec 2019 18:37)  oxyCODONE 5 mg oral tablet: 1 tab(s) orally 2 times a day at 8am and 10pm (09 Dec 2019 18:37)  pantoprazole 40 mg oral delayed release tablet: 1 tab(s) orally once a day (09 Dec 2019 18:37)  potassium citrate 10 mEq oral tablet, extended release: 2 tab(s) orally once a day (09 Dec 2019 18:37)  sertraline 50 mg oral tablet: 3 tab(s) orally once a day (09 Dec 2019 18:37)  spironolactone 25 mg oral tablet: 1 tab(s) orally every other day (09 Dec 2019 18:37)  Tylenol Extra Strength 500 mg oral tablet: 2 tab(s) orally once a day, As Needed - for moderate pain (09 Dec 2019 18:37)  Tylenol Extra Strength 500 mg oral tablet: 2 tab(s) orally 2 times a day at 7am and 1pm (09 Dec 2019 18:37)  Vitamin B-12 1000 mcg oral tablet: 1 tab(s) orally once a day (09 Dec 2019 18:37)  Vitamin C 1000 mg oral tablet: 1 tab(s) orally once a day (09 Dec 2019 18:37)  Vitamin D3 1000 intl units oral capsule: 1 cap(s) orally once a day (09 Dec 2019 18:37)  zolpidem 6.25 mg oral tablet, extended release: 1 tab(s) orally once a day (at bedtime) (09 Dec 2019 18:37)    MEDICATIONS  (STANDING):  atorvastatin 10 milliGRAM(s) Oral at bedtime  clonazePAM  Tablet 1 milliGRAM(s) Oral two times a day  clonazePAM  Tablet 0.5 milliGRAM(s) Oral daily  doxycycline hyclate Capsule 100 milliGRAM(s) Oral every 12 hours  ferrous    sulfate 325 milliGRAM(s) Oral two times a day  folic acid 1 milliGRAM(s) Oral daily  heparin  Infusion.  Unit(s)/Hr (11 mL/Hr) IV Continuous <Continuous>  lactated ringers. 1000 milliLiter(s) (70 mL/Hr) IV Continuous <Continuous>  melatonin 3 milliGRAM(s) Oral at bedtime  meropenem  IVPB 500 milliGRAM(s) IV Intermittent every 12 hours  metoprolol tartrate 25 milliGRAM(s) Oral two times a day  mirtazapine 7.5 milliGRAM(s) Oral at bedtime  oxyCODONE    IR 5 milliGRAM(s) Oral two times a day  pantoprazole    Tablet 40 milliGRAM(s) Oral before breakfast  sertraline 100 milliGRAM(s) Oral daily    MEDICATIONS  (PRN):  acetaminophen   Tablet .. 1000 milliGRAM(s) Oral every 12 hours PRN Moderate Pain (4 - 6)  heparin  Injectable 4500 Unit(s) IV Push every 6 hours PRN For aPTT less than 40  heparin  Injectable 2000 Unit(s) IV Push every 6 hours PRN For aPTT between 40 - 57  zolpidem 5 milliGRAM(s) Oral at bedtime PRN Insomnia  zolpidem 5 milliGRAM(s) Oral at bedtime PRN Insomnia      Allergies    Erythromycin Base (Other)  penicillin (Rash)    Intolerances        Vital Signs Last 24 Hrs  T(C): 36.4 (14 Dec 2019 05:09), Max: 36.4 (13 Dec 2019 20:59)  T(F): 97.5 (14 Dec 2019 05:09), Max: 97.5 (13 Dec 2019 20:59)  HR: 106 (14 Dec 2019 05:09) (102 - 115)  BP: 95/60 (14 Dec 2019 05:09) (92/53 - 101/53)  BP(mean): --  RR: 16 (14 Dec 2019 05:09) (16 - 18)  SpO2: 95% (14 Dec 2019 05:09) (95% - 95%)      PHYSICAL EXAMINATION:    NECK:  Supple. No lymphadenopathy. Jugular venous pressure not elevated. Carotids equal.   HEART:   The cardiac impulse has a normal quality. Reg., Nl S1 and S2.  There are no murmurs, rubs or gallops noted  CHEST:  Chest crackles to auscultation. Normal respiratory effort.  ABDOMEN:  Soft and nontender.   EXTREMITIES:  There is no edema.       LABS:                        11.8   29.73 )-----------( 81       ( 14 Dec 2019 01:42 )             40.9     12-13    145  |  122<H>  |  70<H>  ----------------------------<  110<H>  4.3   |  15<L>  |  2.16<H>    Ca    8.1<L>      13 Dec 2019 07:35      PT/INR - ( 13 Dec 2019 07:42 )   PT: 20.1 sec;   INR: 1.78 ratio         PTT - ( 14 Dec 2019 01:42 )  PTT:192.0 sec

## 2019-12-14 NOTE — PROGRESS NOTE ADULT - SUBJECTIVE AND OBJECTIVE BOX
Subjective:  no acute events     Vital Signs:  Vital Signs Last 24 Hrs  T(C): 36.4 (12-14-19 @ 05:09), Max: 36.4 (12-13-19 @ 20:59)  T(F): 97.5 (12-14-19 @ 05:09), Max: 97.5 (12-13-19 @ 20:59)  HR: 106 (12-14-19 @ 05:09) (85 - 115)  BP: 95/60 (12-14-19 @ 05:09) (92/53 - 101/57)  RR: 16 (12-14-19 @ 05:09) (16 - 19)  SpO2: 95% (12-14-19 @ 05:09) (94% - 95%) on (O2)    Telemetry/Alarms:    Relevant labs, radiology and Medications reviewed                        11.8   29.73 )-----------( 81       ( 14 Dec 2019 01:42 )             40.9     12-13    145  |  122<H>  |  70<H>  ----------------------------<  110<H>  4.3   |  15<L>  |  2.16<H>    Ca    8.1<L>      13 Dec 2019 07:35      PT/INR - ( 13 Dec 2019 07:42 )   PT: 20.1 sec;   INR: 1.78 ratio         PTT - ( 14 Dec 2019 01:42 )  PTT:192.0 sec  MEDICATIONS  (STANDING):  atorvastatin 10 milliGRAM(s) Oral at bedtime  clonazePAM  Tablet 1 milliGRAM(s) Oral two times a day  clonazePAM  Tablet 0.5 milliGRAM(s) Oral daily  doxycycline hyclate Capsule 100 milliGRAM(s) Oral every 12 hours  ferrous    sulfate 325 milliGRAM(s) Oral two times a day  folic acid 1 milliGRAM(s) Oral daily  heparin  Infusion.  Unit(s)/Hr (11 mL/Hr) IV Continuous <Continuous>  lactated ringers. 1000 milliLiter(s) (70 mL/Hr) IV Continuous <Continuous>  melatonin 3 milliGRAM(s) Oral at bedtime  meropenem  IVPB 500 milliGRAM(s) IV Intermittent every 12 hours  metoprolol tartrate 25 milliGRAM(s) Oral two times a day  mirtazapine 7.5 milliGRAM(s) Oral at bedtime  oxyCODONE    IR 5 milliGRAM(s) Oral two times a day  pantoprazole    Tablet 40 milliGRAM(s) Oral before breakfast  sertraline 100 milliGRAM(s) Oral daily    MEDICATIONS  (PRN):  acetaminophen   Tablet .. 1000 milliGRAM(s) Oral every 12 hours PRN Moderate Pain (4 - 6)  heparin  Injectable 4500 Unit(s) IV Push every 6 hours PRN For aPTT less than 40  heparin  Injectable 2000 Unit(s) IV Push every 6 hours PRN For aPTT between 40 - 57  zolpidem 5 milliGRAM(s) Oral at bedtime PRN Insomnia  zolpidem 5 milliGRAM(s) Oral at bedtime PRN Insomnia      Physical exam  Gen awake and alert   Neuro confused but orientated to self   Card sinus tach   Pulm diminished breath sounds at the bases   Abd soft nd/nt   Ext  warm and well perfused          I&O's Summary      Assessment  84y Female  w/ PAST MEDICAL & SURGICAL HISTORY:  Chronic kidney disease  GERD (gastroesophageal reflux disease)  Urinary incontinence  Diarrhea  Osteoporosis  Obesity  Hyperlipidemia  Hypertension  Abdominal pain  Anxiety  Anemia  Myeloma  Nephrolithiasis  S/P cataract surgery: b/l  S/P colonoscopy  S/P tonsillectomy  Encounter for cholecystectomy  Abnormal biopsy result: abdomen May 2019  H/O total hip arthroplasty, bilateral  History of bilateral knee arthroplasty  H/O total hysterectomy  History of hernia repair  admitted with complaints of Patient is a 84y old  Female who presents with a chief complaint of Pneumonia with parapneumonic effusion  UTI  ARF  WEakness  sepsis (14 Dec 2019 08:12)  .  On (Date), patient underwent Right thoracentesis  . Postoperative course/issues:    PLAN    Pleural fluid studies show fluid to be transudative. No obvious indications that the fluid is infected. My concern for empyema is low.   Will follow up on final cultures and cytology   Thank you for the consult.

## 2019-12-14 NOTE — PROGRESS NOTE ADULT - ASSESSMENT
* Sepsis in the setting of Pneumonia   * elevated WBC most likely infectious in origin, but differential could be a dz process driving WBC elevation  * Failure to thrive  * UTI  * DAMON on CKD stage 2- suspect prerenal with poor PO intake  * leukocytosis / anemia /  thrombocytopenia  - patient s/p thoracentesis - transudative in character  - Cxray showed right lower lobe consolidation and small right parapneumonic effusion  - broader coverage for meropenem  - tolerating antibiotics without rashes or side effects   - Monitor closely in view of history of penicillin allergy   - supplemental O2 as needed  - nephrology consult for DAMON  - monitor creatinine   - hold spironolactone for now  - renally dose meds  - palliative care evaluation for C meeting   - care discussed with Dr. Iverson.   - Care discussed with Dr. Isabel, family knows and patient that patient has a metastatic dz. Patient has received chemo to decrease symptom burder.    - CT chest showed moderate bilateral pleural effusion, large amount of ascites and bilateral lowr lobe opacities complatibe with atelectasis and probable superimposed pneumonia.  - CT surgery consult, s/p thoracentesis- specimen sent for   - antibiotics optimized to meropenem.     * Spleneic infarct / thrombocytopenia  - heparin /  coumadin bridge  - care discussed with Dr. Isabel     * Atrial fibrillation  - coumadin / heparin  - INR goal 2-3    # Smoldering myeloma  - heme following    # Primary peritoneal carcinoma metastatic  # ? Spleenic infarct  - right sided chest port  - heme following

## 2019-12-15 LAB
ANION GAP SERPL CALC-SCNC: 8 MMOL/L — SIGNIFICANT CHANGE UP (ref 5–17)
BUN SERPL-MCNC: 77 MG/DL — HIGH (ref 7–23)
CALCIUM SERPL-MCNC: 8.3 MG/DL — LOW (ref 8.5–10.1)
CHLORIDE SERPL-SCNC: 122 MMOL/L — HIGH (ref 96–108)
CO2 SERPL-SCNC: 18 MMOL/L — LOW (ref 22–31)
CREAT SERPL-MCNC: 2.49 MG/DL — HIGH (ref 0.5–1.3)
GLUCOSE SERPL-MCNC: 93 MG/DL — SIGNIFICANT CHANGE UP (ref 70–99)
HCT VFR BLD CALC: 36.1 % — SIGNIFICANT CHANGE UP (ref 34.5–45)
HGB BLD-MCNC: 10.4 G/DL — LOW (ref 11.5–15.5)
MCHC RBC-ENTMCNC: 28.8 GM/DL — LOW (ref 32–36)
MCHC RBC-ENTMCNC: 29.5 PG — SIGNIFICANT CHANGE UP (ref 27–34)
MCV RBC AUTO: 102.6 FL — HIGH (ref 80–100)
PLATELET # BLD AUTO: 94 K/UL — LOW (ref 150–400)
POTASSIUM SERPL-MCNC: 4.8 MMOL/L — SIGNIFICANT CHANGE UP (ref 3.5–5.3)
POTASSIUM SERPL-SCNC: 4.8 MMOL/L — SIGNIFICANT CHANGE UP (ref 3.5–5.3)
RBC # BLD: 3.52 M/UL — LOW (ref 3.8–5.2)
RBC # FLD: 18.4 % — HIGH (ref 10.3–14.5)
SODIUM SERPL-SCNC: 148 MMOL/L — HIGH (ref 135–145)
WBC # BLD: 27.04 K/UL — HIGH (ref 3.8–10.5)
WBC # FLD AUTO: 27.04 K/UL — HIGH (ref 3.8–10.5)

## 2019-12-15 PROCEDURE — 99232 SBSQ HOSP IP/OBS MODERATE 35: CPT

## 2019-12-15 RX ORDER — SODIUM CHLORIDE 9 MG/ML
1000 INJECTION, SOLUTION INTRAVENOUS
Refills: 0 | Status: DISCONTINUED | OUTPATIENT
Start: 2019-12-15 | End: 2019-12-17

## 2019-12-15 RX ADMIN — Medication 1 MILLIGRAM(S): at 11:17

## 2019-12-15 RX ADMIN — Medication 3 MILLIGRAM(S): at 21:43

## 2019-12-15 RX ADMIN — OXYCODONE HYDROCHLORIDE 5 MILLIGRAM(S): 5 TABLET ORAL at 22:15

## 2019-12-15 RX ADMIN — Medication 0.5 MILLIGRAM(S): at 11:17

## 2019-12-15 RX ADMIN — APIXABAN 10 MILLIGRAM(S): 2.5 TABLET, FILM COATED ORAL at 06:00

## 2019-12-15 RX ADMIN — OXYCODONE HYDROCHLORIDE 5 MILLIGRAM(S): 5 TABLET ORAL at 11:17

## 2019-12-15 RX ADMIN — Medication 100 MILLIGRAM(S): at 18:12

## 2019-12-15 RX ADMIN — MEROPENEM 100 MILLIGRAM(S): 1 INJECTION INTRAVENOUS at 18:11

## 2019-12-15 RX ADMIN — OXYCODONE HYDROCHLORIDE 5 MILLIGRAM(S): 5 TABLET ORAL at 12:17

## 2019-12-15 RX ADMIN — SERTRALINE 100 MILLIGRAM(S): 25 TABLET, FILM COATED ORAL at 11:17

## 2019-12-15 RX ADMIN — SODIUM CHLORIDE 50 MILLILITER(S): 9 INJECTION, SOLUTION INTRAVENOUS at 14:46

## 2019-12-15 RX ADMIN — Medication 325 MILLIGRAM(S): at 18:12

## 2019-12-15 RX ADMIN — Medication 1 MILLIGRAM(S): at 06:09

## 2019-12-15 RX ADMIN — APIXABAN 10 MILLIGRAM(S): 2.5 TABLET, FILM COATED ORAL at 18:12

## 2019-12-15 RX ADMIN — Medication 100 MILLIGRAM(S): at 06:00

## 2019-12-15 RX ADMIN — Medication 325 MILLIGRAM(S): at 06:00

## 2019-12-15 RX ADMIN — OXYCODONE HYDROCHLORIDE 5 MILLIGRAM(S): 5 TABLET ORAL at 21:44

## 2019-12-15 RX ADMIN — Medication 1 MILLIGRAM(S): at 18:12

## 2019-12-15 RX ADMIN — MIRTAZAPINE 7.5 MILLIGRAM(S): 45 TABLET, ORALLY DISINTEGRATING ORAL at 21:43

## 2019-12-15 RX ADMIN — ATORVASTATIN CALCIUM 10 MILLIGRAM(S): 80 TABLET, FILM COATED ORAL at 21:43

## 2019-12-15 RX ADMIN — MEROPENEM 100 MILLIGRAM(S): 1 INJECTION INTRAVENOUS at 06:04

## 2019-12-15 RX ADMIN — PANTOPRAZOLE SODIUM 40 MILLIGRAM(S): 20 TABLET, DELAYED RELEASE ORAL at 06:04

## 2019-12-15 RX ADMIN — MEROPENEM 100 MILLIGRAM(S): 1 INJECTION INTRAVENOUS at 00:00

## 2019-12-15 NOTE — CHART NOTE - NSCHARTNOTEFT_GEN_A_CORE
Was notified early in evening by nurse for low BP's, systolic in  the 70s and increased lethargy. Patient was seen and examined at patient. Arousable, alert to self. States she has been feeling more tired, but not short of breath, no CP, chest palpitations. Lactated ringers 500 cc times 2 ordered. BP's now above 90's s/p fluids. Will continue to monitor.

## 2019-12-15 NOTE — PROGRESS NOTE ADULT - SUBJECTIVE AND OBJECTIVE BOX
Subjective:    pat better, sitting in chair. no new complaint.    Home Medications:  aspirin 325 mg oral delayed release tablet: 1 tab(s) orally once a day (09 Dec 2019 18:37)  atorvastatin 10 mg oral tablet: 1 tab(s) orally once a day (09 Dec 2019 18:37)  Boniva 150 mg oral tablet: 1 tab(s) orally once a month on the 3rd day (09 Dec 2019 18:37)  Caltrate 600 + D oral tablet: 1 tab(s) orally 2 times a day (09 Dec 2019 18:37)  clonazePAM 1 mg oral tablet: 1 tab(s) orally 2 times a day at 6 am and 10pm (09 Dec 2019 18:37)  clonazePAM 1 mg oral tablet: 0.5 tab(s) orally once a day at 2pm (hold for sedation) (09 Dec 2019 18:37)  ferrous sulfate 325 mg (65 mg elemental iron) oral tablet: 1 tab(s) orally 3 times a day (09 Dec 2019 18:37)  folic acid 1 mg oral tablet: 1 tab(s) orally once a day (09 Dec 2019 18:37)  loperamide 2 mg oral capsule: 1 cap(s) orally every 8 hours, As Needed - for diarrhea (09 Dec 2019 18:37)  Melatonin 3 mg oral tablet: 2 tab(s) orally once a day (at bedtime) (09 Dec 2019 18:37)  Metoprolol Succinate  mg oral tablet, extended release: 1 tab(s) orally once a day (09 Dec 2019 18:37)  mirtazapine 7.5 mg oral tablet: 1 tab(s) orally once a day (at bedtime) (09 Dec 2019 18:37)  Multiple Vitamins oral tablet: 1 tab(s) orally once a day (09 Dec 2019 18:37)  oxyCODONE 5 mg oral tablet: 1 tab(s) orally 2 times a day at 8am and 10pm (09 Dec 2019 18:37)  pantoprazole 40 mg oral delayed release tablet: 1 tab(s) orally once a day (09 Dec 2019 18:37)  potassium citrate 10 mEq oral tablet, extended release: 2 tab(s) orally once a day (09 Dec 2019 18:37)  sertraline 50 mg oral tablet: 3 tab(s) orally once a day (09 Dec 2019 18:37)  spironolactone 25 mg oral tablet: 1 tab(s) orally every other day (09 Dec 2019 18:37)  Tylenol Extra Strength 500 mg oral tablet: 2 tab(s) orally once a day, As Needed - for moderate pain (09 Dec 2019 18:37)  Tylenol Extra Strength 500 mg oral tablet: 2 tab(s) orally 2 times a day at 7am and 1pm (09 Dec 2019 18:37)  Vitamin B-12 1000 mcg oral tablet: 1 tab(s) orally once a day (09 Dec 2019 18:37)  Vitamin C 1000 mg oral tablet: 1 tab(s) orally once a day (09 Dec 2019 18:37)  Vitamin D3 1000 intl units oral capsule: 1 cap(s) orally once a day (09 Dec 2019 18:37)  zolpidem 6.25 mg oral tablet, extended release: 1 tab(s) orally once a day (at bedtime) (09 Dec 2019 18:37)    MEDICATIONS  (STANDING):  apixaban 10 milliGRAM(s) Oral every 12 hours  atorvastatin 10 milliGRAM(s) Oral at bedtime  clonazePAM  Tablet 1 milliGRAM(s) Oral two times a day  clonazePAM  Tablet 0.5 milliGRAM(s) Oral daily  doxycycline hyclate Capsule 100 milliGRAM(s) Oral every 12 hours  ferrous    sulfate 325 milliGRAM(s) Oral two times a day  folic acid 1 milliGRAM(s) Oral daily  lactated ringers. 1000 milliLiter(s) (70 mL/Hr) IV Continuous <Continuous>  melatonin 3 milliGRAM(s) Oral at bedtime  meropenem  IVPB 500 milliGRAM(s) IV Intermittent every 12 hours  metoprolol tartrate 25 milliGRAM(s) Oral two times a day  mirtazapine 7.5 milliGRAM(s) Oral at bedtime  oxyCODONE    IR 5 milliGRAM(s) Oral two times a day  pantoprazole    Tablet 40 milliGRAM(s) Oral before breakfast  sertraline 100 milliGRAM(s) Oral daily  sodium chloride 0.45%. 1000 milliLiter(s) (50 mL/Hr) IV Continuous <Continuous>    MEDICATIONS  (PRN):  acetaminophen   Tablet .. 1000 milliGRAM(s) Oral every 12 hours PRN Moderate Pain (4 - 6)  zolpidem 5 milliGRAM(s) Oral at bedtime PRN Insomnia  zolpidem 5 milliGRAM(s) Oral at bedtime PRN Insomnia      Allergies    Erythromycin Base (Other)  penicillin (Rash)    Intolerances        Vital Signs Last 24 Hrs  T(C): 36.2 (15 Dec 2019 10:11), Max: 36.8 (14 Dec 2019 21:17)  T(F): 97.2 (15 Dec 2019 10:11), Max: 98.2 (14 Dec 2019 21:17)  HR: 92 (15 Dec 2019 10:11) (76 - 92)  BP: 92/63 (15 Dec 2019 09:01) (80/48 - 94/48)  BP(mean): --  RR: 20 (15 Dec 2019 10:11) (16 - 20)  SpO2: 96% (15 Dec 2019 10:11) (95% - 96%)      PHYSICAL EXAMINATION:    NECK:  Supple. No lymphadenopathy. Jugular venous pressure not elevated. Carotids equal.   HEART:   The cardiac impulse has a normal quality. Reg., Nl S1 and S2.  There are no murmurs, rubs or gallops noted  CHEST:  Chest crackles to auscultation. Normal respiratory effort.  ABDOMEN:  Soft and nontender.   EXTREMITIES:  There is no edema.       LABS:                        10.4   27.04 )-----------( 94       ( 15 Dec 2019 07:00 )             36.1     12-15    148<H>  |  122<H>  |  77<H>  ----------------------------<  93  4.8   |  18<L>  |  2.49<H>    Ca    8.3<L>      15 Dec 2019 07:00      PTT - ( 14 Dec 2019 01:42 )  PTT:192.0 sec

## 2019-12-15 NOTE — PROGRESS NOTE ADULT - SUBJECTIVE AND OBJECTIVE BOX
Patient is a 84y Female who reports no compalints confused   overnight required LR bolux 500 ml x 2 for SBP 70's   not eating per staff     MEDICATIONS  (STANDING):  apixaban 10 milliGRAM(s) Oral every 12 hours  atorvastatin 10 milliGRAM(s) Oral at bedtime  clonazePAM  Tablet 1 milliGRAM(s) Oral two times a day  clonazePAM  Tablet 0.5 milliGRAM(s) Oral daily  doxycycline hyclate Capsule 100 milliGRAM(s) Oral every 12 hours  ferrous    sulfate 325 milliGRAM(s) Oral two times a day  folic acid 1 milliGRAM(s) Oral daily  lactated ringers. 1000 milliLiter(s) (70 mL/Hr) IV Continuous <Continuous>  melatonin 3 milliGRAM(s) Oral at bedtime  meropenem  IVPB 500 milliGRAM(s) IV Intermittent every 12 hours  metoprolol tartrate 25 milliGRAM(s) Oral two times a day  mirtazapine 7.5 milliGRAM(s) Oral at bedtime  oxyCODONE    IR 5 milliGRAM(s) Oral two times a day  pantoprazole    Tablet 40 milliGRAM(s) Oral before breakfast  sertraline 100 milliGRAM(s) Oral daily    MEDICATIONS  (PRN):  acetaminophen   Tablet .. 1000 milliGRAM(s) Oral every 12 hours PRN Moderate Pain (4 - 6)  zolpidem 5 milliGRAM(s) Oral at bedtime PRN Insomnia  zolpidem 5 milliGRAM(s) Oral at bedtime PRN Insomnia      Vital Signs Last 24 Hrs  T(C): 36.2 (15 Dec 2019 10:11), Max: 36.8 (14 Dec 2019 21:17)  T(F): 97.2 (15 Dec 2019 10:11), Max: 98.2 (14 Dec 2019 21:17)  HR: 92 (15 Dec 2019 10:11) (76 - 126)  BP: 92/63 (15 Dec 2019 09:01) (80/48 - 102/74)  BP(mean): --  RR: 20 (15 Dec 2019 10:11) (16 - 20)  SpO2: 96% (15 Dec 2019 10:11) (95% - 97%)    I&O's Detail    15 Dec 2019 07:01  -  15 Dec 2019 13:45  --------------------------------------------------------  IN:  Total IN: 0 mL    OUT:    Voided: 200 mL  Total OUT: 200 mL    Total NET: -200 mL            PHYSICAL EXAM:    Constitutional: NAD, frail,  HEENT: temp wasting, cachectic  Neck: No LAD, No JVD  Respiratory: dist BS  Cardiovascular: S1 and S2, RRR  Gastrointestinal: BS+, soft, NT/ND  Extremities:  + peripheral edema  Neurological: lethargic   : No Herbert  Skin: No rashes  Access: Not applicable        LABS:               148    |  122    |  77     ----------------------------<  93        15 Dec 2019 07:00  4.8     |  18     |  2.49     145    |  122    |  70     ----------------------------<  110       13 Dec 2019 07:35  4.3     |  15     |  2.16     147    |  121    |  70     ----------------------------<  110       12 Dec 2019 07:52  4.4     |  17     |  2.12     Ca    8.3        15 Dec 2019 07:00  Ca    8.1        13 Dec 2019 07:35                            10.4   27.04 )-----------( 94       ( 15 Dec 2019 07:00 )             36.1                         11.8   29.73 )-----------( 81       ( 14 Dec 2019 01:42 )             40.9

## 2019-12-15 NOTE — PROGRESS NOTE ADULT - SUBJECTIVE AND OBJECTIVE BOX
83 y/o woman with a history of HTN, HLD, "arrythmia" (details not known), CKD, GERD, who presented to the ER from her assisted living facility on 12/9/19 for the evaluation of shortness of breath, abdominal pain, recent fall, generalized weakness -- subsequently diagnosed with pneumonia associated with pleural effusions, leukocytosis.  Patient is a poor informant and unable to provide any details of her illness.  She says, "I don't know how I feel" and "they say I have pneumonia."  Unable to identify exacerbating or alleviating factors.  Cardiology consult requested due to an episode of AF starting on 12/11/19.  Mrs. Wallace says she had an "irregular heart" but it "went away."  She denies experiencing palpitations and angina.    12/13/19: contacted family they deny any prior history of afib.  Pt w/o complaints, but has cognitive deficits.  tele w/ NSR no afib recurrence since yesterday.  12/14/19: Alert but confused Tele SR   12/15/10 frail ill appearing tele SR pressure low received fluid bolus    MEDICATIONS  (STANDING):  apixaban 10 milliGRAM(s) Oral every 12 hours  atorvastatin 10 milliGRAM(s) Oral at bedtime  clonazePAM  Tablet 1 milliGRAM(s) Oral two times a day  clonazePAM  Tablet 0.5 milliGRAM(s) Oral daily  doxycycline hyclate Capsule 100 milliGRAM(s) Oral every 12 hours  ferrous    sulfate 325 milliGRAM(s) Oral two times a day  folic acid 1 milliGRAM(s) Oral daily  lactated ringers. 1000 milliLiter(s) (70 mL/Hr) IV Continuous <Continuous>  melatonin 3 milliGRAM(s) Oral at bedtime  meropenem  IVPB 500 milliGRAM(s) IV Intermittent every 12 hours  metoprolol tartrate 25 milliGRAM(s) Oral two times a day  mirtazapine 7.5 milliGRAM(s) Oral at bedtime  oxyCODONE    IR 5 milliGRAM(s) Oral two times a day  pantoprazole    Tablet 40 milliGRAM(s) Oral before breakfast  sertraline 100 milliGRAM(s) Oral daily    MEDICATIONS  (PRN):  acetaminophen   Tablet .. 1000 milliGRAM(s) Oral every 12 hours PRN Moderate Pain (4 - 6)  zolpidem 5 milliGRAM(s) Oral at bedtime PRN Insomnia  zolpidem 5 milliGRAM(s) Oral at bedtime PRN Insomnia        Vital Signs Last 24 Hrs  T(C): 36.2 (15 Dec 2019 05:56), Max: 36.8 (14 Dec 2019 21:17)  T(F): 97.2 (15 Dec 2019 05:56), Max: 98.2 (14 Dec 2019 21:17)  HR: 79 (15 Dec 2019 05:56) (76 - 126)  BP: 91/51 (15 Dec 2019 05:56) (80/48 - 102/74)  BP(mean): --  RR: 16 (15 Dec 2019 05:56) (16 - 19)  SpO2: 95% (15 Dec 2019 05:56) (95% - 97%)      PHYSICAL EXAM:    Constitutional: awake frail appearing   Neck:  supple,  No JVD  Respiratory: Breath sounds are clear bilaterally  Cardiovascular: S1 and S2, regular rate and rhythm  Gastrointestinal: + distention soft non tender  Extremities: No peripheral edema. No clubbing or cyanosis.  Vascular: 2+ peripheral pulses  Musculoskeletal: no calf tenderness.  Skin: No rashes.      LABS: All Labs Reviewed:                        9.7    24.54 )-----------( 79       ( 13 Dec 2019 07:35 )             33.8                         9.7    18.64 )-----------( 76       ( 11 Dec 2019 07:20 )             33.0     13 Dec 2019 07:35    145    |  122    |  70     ----------------------------<  110    4.3     |  15     |  2.16   12 Dec 2019 07:52    147    |  121    |  70     ----------------------------<  110    4.4     |  17     |  2.12   11 Dec 2019 07:20    144    |  120    |  72     ----------------------------<  93     4.3     |  16     |  2.26     Ca    8.1        13 Dec 2019 07:35  Ca    7.6        12 Dec 2019 07:52  Ca    7.0        11 Dec 2019 07:20      PT/INR - ( 13 Dec 2019 07:42 )   PT: 20.1 sec;   INR: 1.78 ratio         PTT - ( 13 Dec 2019 07:42 )  PTT:36.1 sec      < from: Transthoracic Echocardiogram (12.14.19 @ 10:54) >  Estimated left ventricular ejection fraction is 55-60 %.   The left ventricle is normal in size, wall thickness, wall motion and   contractility as seen in limited views.   The aortic valve is not well visualized, appears normal. Valve opening   seems to be normal.   The mitral valve leaflets appear thin and normal.   Mild (1+) mitral regurgitation is present.    < end of copied text >

## 2019-12-15 NOTE — PROGRESS NOTE ADULT - PROBLEM SELECTOR PLAN 1
Presently in SR with brief run SVT.  Pressure low had received fluid bolus will continue to monitor.  Heparin discontinued is on Eliquis. Carefully monitor for signs of bleeding.  Echo reviewed

## 2019-12-15 NOTE — PROGRESS NOTE ADULT - ASSESSMENT
PROBLEMS:    CAP-right lower lobe consolidation and small right parapneumonic effusion- persistant elevated wbc CT chest to evaluate effusion may need thoracentesis to evalue empyema  UTI-e-coli  DAMON on CKD stage 2- prerenal   leukocytosis / anemia /  thrombocytopenia    PLAN:    pulmonary unchanged  IV meropenem/doxycylin  poor prognosis  supportive care  D/w DR Hein   dvt prophylasix

## 2019-12-15 NOTE — PROGRESS NOTE ADULT - ASSESSMENT
84 MM, HTN, HLD, arrythmia, CKD III presenting to the ED via EMS form [facility] c/o SOB and  weakness, decreased appetitive with renal evaluation of DAMON.     DAMON  -Suspect pre-renal with poor intake and diuretic use - Cr was improving now rising w episodes of hypotension  - will resume gentle hydration IV with poor intake - hypotnic fluid   - Renal sono - neg for hydro   - Maintain MAP, optimize intake    Hypernatremia    increase po fluid intake- pt not eating    half NS x 1 liter then reassess after GOC meeting tomorrow     CKD III  -baseline near 1.5 mg/dl  -NSAID avoidance    FTT/Poor intake  -Dietary optimization as able  - hx of malignancy per Dr camacho     d/w RN staff

## 2019-12-15 NOTE — PROGRESS NOTE ADULT - SUBJECTIVE AND OBJECTIVE BOX
Patient is a 83 y/o F with PMHx of HTN, HLD, arrythmia, CKD II presenting to the ED via EMS form [facility] c/o SOB and  weakness, decreased appetitite. Per EMS, pt reported that she fell out of bed twice within the past 2 days, but did not fall today. Patient is a poor historian. Admitted for pneumonia and UTI- started on IV antibiotics.     12/13: Patient was seen and examined. S/p thoracenetesis with CT surgery- tolerated procedure well. Patient complaining of pain on procedure site. denies chest pain, fever or chills. Care discussed with Dr. Jurado and Dr. camacho. Started heparin /  coumadin bridge.      12/15: Frail, chronically sick appearing. Conversation with the son - who will have a C discussion with the palliative care in the am. Denies any HA, CP, SOB. no fevers, chills or shakes. Very deconditioned. Conversation with the nursing - skin breakdown /  low BPs.     REVIEW OF SYSTEMS:  CONSTITUTIONAL: + weakness  EYES/ENT: No visual changes;  No vertigo or throat pain   NECK: No pain or stiffness  RESPIRATORY: No cough, wheezing, hemoptysis; No shortness of breath  CARDIOVASCULAR: Abdominal distention  GENITOURINARY: No dysuria, frequency or hematuria  NEUROLOGICAL: No numbness or weakness  SKIN: No itching, burning, rashes, or lesions   All other review of systems is negative unless indicated above.    PE:  Constitutional: NAD, laying in bed, sick, deconditioned and frail  HEENT: NC/AT  Back: no tenderness  Respiratory: respirations even and non labored, coarse breath sounds throughout lung fields.   Cardiovascular: S1S2 regular, no murmurs  Abdomen: soft, not tender, + distended,  Genitourinary: voiding  Rectal: deferred  Musculoskeletal: no muscle tenderness, no joint swelling or tenderness                                  INTERPRETATION:  Short of breath.    AP chest. Prior 6/17/2019.    Chin obscures portion right apex. Low lung volumes. No change heart   mediastinum. Right chestport reidentified in situ. There is new focal   consolidation the right lower lobe likely representing pneumonia and   atelectasis in the appropriate clinical setting. New small right pleural   effusion.    Impression: Right lower lobe consolidation and small right parapneumonic   effusion

## 2019-12-15 NOTE — PROGRESS NOTE ADULT - ASSESSMENT
* Sepsis in the setting of Pneumonia   * elevated WBC most likely infectious in origin, but differential could be a dz process driving WBC elevation  * Failure to thrive  * UTI  * DAMON on CKD stage 2- suspect prerenal with poor PO intake  * leukocytosis / anemia /  thrombocytopenia  - patient s/p thoracentesis - transudative in character  - Cxray showed right lower lobe consolidation and small right parapneumonic effusion  - broader coverage for meropenem  - tolerating antibiotics without rashes or side effects   - Monitor closely in view of history of penicillin allergy   - supplemental O2 as needed  - nephrology consult for DAMON  - monitor creatinine   - hold spironolactone for now  - renally dose meds  - palliative care evaluation for GOC meeting   - care discussed with Dr. Iverson.   - Care discussed with Dr. Isabel, family knows and patient that patient has a metastatic dz. Patient has received chemo to decrease symptom burder.    - CT chest showed moderate bilateral pleural effusion, large amount of ascites and bilateral lowr lobe opacities complatibe with atelectasis and probable superimposed pneumonia.  - CT surgery consult, s/p thoracentesis- specimen sent for   - antibiotics optimized to meropenem.     * Spleneic infarct / thrombocytopenia  - started on eliquis  - care discussed with Dr. Isabel     * Atrial fibrillation  - started on Eliquis    # Smoldering myeloma  - heme following    # Primary peritoneal carcinoma metastatic  # ? Spleenic infarct  - right sided chest port  - heme following

## 2019-12-16 LAB
ALBUMIN SERPL ELPH-MCNC: 1.5 G/DL — LOW (ref 3.3–5)
ANION GAP SERPL CALC-SCNC: 8 MMOL/L — SIGNIFICANT CHANGE UP (ref 5–17)
BUN SERPL-MCNC: 76 MG/DL — HIGH (ref 7–23)
CALCIUM SERPL-MCNC: 8.3 MG/DL — LOW (ref 8.5–10.1)
CHLORIDE SERPL-SCNC: 120 MMOL/L — HIGH (ref 96–108)
CO2 SERPL-SCNC: 15 MMOL/L — LOW (ref 22–31)
CREAT SERPL-MCNC: 2.55 MG/DL — HIGH (ref 0.5–1.3)
GLUCOSE SERPL-MCNC: 125 MG/DL — HIGH (ref 70–99)
HCT VFR BLD CALC: 32.2 % — LOW (ref 34.5–45)
HGB BLD-MCNC: 9.8 G/DL — LOW (ref 11.5–15.5)
MCHC RBC-ENTMCNC: 30.4 GM/DL — LOW (ref 32–36)
MCHC RBC-ENTMCNC: 30.5 PG — SIGNIFICANT CHANGE UP (ref 27–34)
MCV RBC AUTO: 100.3 FL — HIGH (ref 80–100)
PHOSPHATE SERPL-MCNC: 4.5 MG/DL — SIGNIFICANT CHANGE UP (ref 2.5–4.5)
PLATELET # BLD AUTO: 103 K/UL — LOW (ref 150–400)
POTASSIUM SERPL-MCNC: 4.9 MMOL/L — SIGNIFICANT CHANGE UP (ref 3.5–5.3)
POTASSIUM SERPL-SCNC: 4.9 MMOL/L — SIGNIFICANT CHANGE UP (ref 3.5–5.3)
RBC # BLD: 3.21 M/UL — LOW (ref 3.8–5.2)
RBC # FLD: 18.6 % — HIGH (ref 10.3–14.5)
SODIUM SERPL-SCNC: 143 MMOL/L — SIGNIFICANT CHANGE UP (ref 135–145)
WBC # BLD: 25.05 K/UL — HIGH (ref 3.8–10.5)
WBC # FLD AUTO: 25.05 K/UL — HIGH (ref 3.8–10.5)

## 2019-12-16 PROCEDURE — 99232 SBSQ HOSP IP/OBS MODERATE 35: CPT

## 2019-12-16 RX ORDER — ALBUMIN HUMAN 25 %
250 VIAL (ML) INTRAVENOUS ONCE
Refills: 0 | Status: COMPLETED | OUTPATIENT
Start: 2019-12-16 | End: 2019-12-16

## 2019-12-16 RX ORDER — SODIUM CHLORIDE 9 MG/ML
1000 INJECTION INTRAMUSCULAR; INTRAVENOUS; SUBCUTANEOUS ONCE
Refills: 0 | Status: COMPLETED | OUTPATIENT
Start: 2019-12-16 | End: 2019-12-16

## 2019-12-16 RX ORDER — MIDODRINE HYDROCHLORIDE 2.5 MG/1
5 TABLET ORAL THREE TIMES A DAY
Refills: 0 | Status: DISCONTINUED | OUTPATIENT
Start: 2019-12-16 | End: 2019-12-17

## 2019-12-16 RX ORDER — MORPHINE SULFATE 50 MG/1
2 CAPSULE, EXTENDED RELEASE ORAL EVERY 4 HOURS
Refills: 0 | Status: DISCONTINUED | OUTPATIENT
Start: 2019-12-16 | End: 2019-12-17

## 2019-12-16 RX ORDER — MIDODRINE HYDROCHLORIDE 2.5 MG/1
10 TABLET ORAL THREE TIMES A DAY
Refills: 0 | Status: DISCONTINUED | OUTPATIENT
Start: 2019-12-16 | End: 2019-12-16

## 2019-12-16 RX ADMIN — MEROPENEM 100 MILLIGRAM(S): 1 INJECTION INTRAVENOUS at 06:09

## 2019-12-16 RX ADMIN — MIDODRINE HYDROCHLORIDE 5 MILLIGRAM(S): 2.5 TABLET ORAL at 14:06

## 2019-12-16 RX ADMIN — Medication 1 MILLIGRAM(S): at 06:12

## 2019-12-16 RX ADMIN — Medication 1 MILLIGRAM(S): at 11:53

## 2019-12-16 RX ADMIN — MIRTAZAPINE 7.5 MILLIGRAM(S): 45 TABLET, ORALLY DISINTEGRATING ORAL at 22:51

## 2019-12-16 RX ADMIN — MORPHINE SULFATE 2 MILLIGRAM(S): 50 CAPSULE, EXTENDED RELEASE ORAL at 23:00

## 2019-12-16 RX ADMIN — Medication 0.5 MILLIGRAM(S): at 11:53

## 2019-12-16 RX ADMIN — SODIUM CHLORIDE 50 MILLILITER(S): 9 INJECTION, SOLUTION INTRAVENOUS at 08:17

## 2019-12-16 RX ADMIN — APIXABAN 10 MILLIGRAM(S): 2.5 TABLET, FILM COATED ORAL at 17:32

## 2019-12-16 RX ADMIN — OXYCODONE HYDROCHLORIDE 5 MILLIGRAM(S): 5 TABLET ORAL at 10:24

## 2019-12-16 RX ADMIN — MEROPENEM 100 MILLIGRAM(S): 1 INJECTION INTRAVENOUS at 17:32

## 2019-12-16 RX ADMIN — Medication 100 MILLIGRAM(S): at 17:32

## 2019-12-16 RX ADMIN — PANTOPRAZOLE SODIUM 40 MILLIGRAM(S): 20 TABLET, DELAYED RELEASE ORAL at 06:12

## 2019-12-16 RX ADMIN — ATORVASTATIN CALCIUM 10 MILLIGRAM(S): 80 TABLET, FILM COATED ORAL at 22:51

## 2019-12-16 RX ADMIN — Medication 325 MILLIGRAM(S): at 17:32

## 2019-12-16 RX ADMIN — OXYCODONE HYDROCHLORIDE 5 MILLIGRAM(S): 5 TABLET ORAL at 11:25

## 2019-12-16 RX ADMIN — Medication 125 MILLILITER(S): at 18:49

## 2019-12-16 RX ADMIN — APIXABAN 10 MILLIGRAM(S): 2.5 TABLET, FILM COATED ORAL at 06:10

## 2019-12-16 RX ADMIN — Medication 100 MILLIGRAM(S): at 06:10

## 2019-12-16 RX ADMIN — SODIUM CHLORIDE 1000 MILLILITER(S): 9 INJECTION INTRAMUSCULAR; INTRAVENOUS; SUBCUTANEOUS at 14:05

## 2019-12-16 RX ADMIN — SERTRALINE 100 MILLIGRAM(S): 25 TABLET, FILM COATED ORAL at 11:53

## 2019-12-16 RX ADMIN — Medication 325 MILLIGRAM(S): at 06:10

## 2019-12-16 RX ADMIN — Medication 3 MILLIGRAM(S): at 22:51

## 2019-12-16 NOTE — PHYSICAL THERAPY INITIAL EVALUATION ADULT - CRITERIA FOR SKILLED THERAPEUTIC INTERVENTIONS
will attempt completion of Eval @ later date if / when appropriate; pt currently SOB @ rest / deferring bed mob; further course of PT intervention pending

## 2019-12-16 NOTE — PROGRESS NOTE ADULT - SUBJECTIVE AND OBJECTIVE BOX
Subjective:    pat lying in bed, no new complaint.    Home Medications:  aspirin 325 mg oral delayed release tablet: 1 tab(s) orally once a day (09 Dec 2019 18:37)  atorvastatin 10 mg oral tablet: 1 tab(s) orally once a day (09 Dec 2019 18:37)  Boniva 150 mg oral tablet: 1 tab(s) orally once a month on the 3rd day (09 Dec 2019 18:37)  Caltrate 600 + D oral tablet: 1 tab(s) orally 2 times a day (09 Dec 2019 18:37)  clonazePAM 1 mg oral tablet: 1 tab(s) orally 2 times a day at 6 am and 10pm (09 Dec 2019 18:37)  clonazePAM 1 mg oral tablet: 0.5 tab(s) orally once a day at 2pm (hold for sedation) (09 Dec 2019 18:37)  ferrous sulfate 325 mg (65 mg elemental iron) oral tablet: 1 tab(s) orally 3 times a day (09 Dec 2019 18:37)  folic acid 1 mg oral tablet: 1 tab(s) orally once a day (09 Dec 2019 18:37)  loperamide 2 mg oral capsule: 1 cap(s) orally every 8 hours, As Needed - for diarrhea (09 Dec 2019 18:37)  Melatonin 3 mg oral tablet: 2 tab(s) orally once a day (at bedtime) (09 Dec 2019 18:37)  Metoprolol Succinate  mg oral tablet, extended release: 1 tab(s) orally once a day (09 Dec 2019 18:37)  mirtazapine 7.5 mg oral tablet: 1 tab(s) orally once a day (at bedtime) (09 Dec 2019 18:37)  Multiple Vitamins oral tablet: 1 tab(s) orally once a day (09 Dec 2019 18:37)  oxyCODONE 5 mg oral tablet: 1 tab(s) orally 2 times a day at 8am and 10pm (09 Dec 2019 18:37)  pantoprazole 40 mg oral delayed release tablet: 1 tab(s) orally once a day (09 Dec 2019 18:37)  potassium citrate 10 mEq oral tablet, extended release: 2 tab(s) orally once a day (09 Dec 2019 18:37)  sertraline 50 mg oral tablet: 3 tab(s) orally once a day (09 Dec 2019 18:37)  spironolactone 25 mg oral tablet: 1 tab(s) orally every other day (09 Dec 2019 18:37)  Tylenol Extra Strength 500 mg oral tablet: 2 tab(s) orally once a day, As Needed - for moderate pain (09 Dec 2019 18:37)  Tylenol Extra Strength 500 mg oral tablet: 2 tab(s) orally 2 times a day at 7am and 1pm (09 Dec 2019 18:37)  Vitamin B-12 1000 mcg oral tablet: 1 tab(s) orally once a day (09 Dec 2019 18:37)  Vitamin C 1000 mg oral tablet: 1 tab(s) orally once a day (09 Dec 2019 18:37)  Vitamin D3 1000 intl units oral capsule: 1 cap(s) orally once a day (09 Dec 2019 18:37)  zolpidem 6.25 mg oral tablet, extended release: 1 tab(s) orally once a day (at bedtime) (09 Dec 2019 18:37)    MEDICATIONS  (STANDING):  albumin human  5% IVPB 250 milliLiter(s) IV Intermittent once  apixaban 10 milliGRAM(s) Oral every 12 hours  atorvastatin 10 milliGRAM(s) Oral at bedtime  clonazePAM  Tablet 1 milliGRAM(s) Oral two times a day  clonazePAM  Tablet 0.5 milliGRAM(s) Oral daily  doxycycline hyclate Capsule 100 milliGRAM(s) Oral every 12 hours  ferrous    sulfate 325 milliGRAM(s) Oral two times a day  folic acid 1 milliGRAM(s) Oral daily  lactated ringers. 1000 milliLiter(s) (70 mL/Hr) IV Continuous <Continuous>  melatonin 3 milliGRAM(s) Oral at bedtime  meropenem  IVPB 500 milliGRAM(s) IV Intermittent every 12 hours  metoprolol tartrate 25 milliGRAM(s) Oral two times a day  midodrine. 5 milliGRAM(s) Oral three times a day  mirtazapine 7.5 milliGRAM(s) Oral at bedtime  oxyCODONE    IR 5 milliGRAM(s) Oral two times a day  pantoprazole    Tablet 40 milliGRAM(s) Oral before breakfast  sertraline 100 milliGRAM(s) Oral daily  sodium chloride 0.45%. 1000 milliLiter(s) (50 mL/Hr) IV Continuous <Continuous>  sodium chloride 0.9% Bolus 1000 milliLiter(s) IV Bolus once    MEDICATIONS  (PRN):  acetaminophen   Tablet .. 1000 milliGRAM(s) Oral every 12 hours PRN Moderate Pain (4 - 6)  zolpidem 5 milliGRAM(s) Oral at bedtime PRN Insomnia  zolpidem 5 milliGRAM(s) Oral at bedtime PRN Insomnia      Allergies    Erythromycin Base (Other)  penicillin (Rash)    Intolerances        Vital Signs Last 24 Hrs  T(C): 36.4 (16 Dec 2019 11:58), Max: 36.8 (15 Dec 2019 21:24)  T(F): 97.6 (16 Dec 2019 11:58), Max: 98.3 (15 Dec 2019 21:24)  HR: 84 (16 Dec 2019 11:58) (84 - 105)  BP: 78/56 (16 Dec 2019 11:58) (78/56 - 94/61)  BP(mean): --  RR: 21 (16 Dec 2019 11:58) (20 - 21)  SpO2: 94% (16 Dec 2019 11:58) (94% - 95%)      PHYSICAL EXAMINATION:    NECK:  Supple. No lymphadenopathy. Jugular venous pressure not elevated. Carotids equal.   HEART:   The cardiac impulse has a normal quality. Reg., Nl S1 and S2.  There are no murmurs, rubs or gallops noted  CHEST:  Chest crackles to auscultation. Normal respiratory effort.  ABDOMEN:  Soft and nontender.   EXTREMITIES:  There is no edema.       LABS:                        9.8    25.05 )-----------( 103      ( 16 Dec 2019 07:38 )             32.2     12-16    143  |  120<H>  |  76<H>  ----------------------------<  125<H>  4.9   |  15<L>  |  2.55<H>    Ca    8.3<L>      16 Dec 2019 07:38  Phos  4.5     12-16    TPro  x   /  Alb  1.5<L>  /  TBili  x   /  DBili  x   /  AST  x   /  ALT  x   /  AlkPhos  x   12-16

## 2019-12-16 NOTE — CHART NOTE - NSCHARTNOTEFT_GEN_A_CORE
Upon Nutritional Assessment by the Registered Dietitian your patient was determined to meet criteria / has evidence of the following diagnosis/diagnoses:          [ ]  Mild Protein Calorie Malnutrition        [ ]  Moderate Protein Calorie Malnutrition        [x ] Severe Protein Calorie Malnutrition        [ ] Unspecified Protein Calorie Malnutrition        [ ] Underweight / BMI <19        [ ] Morbid Obesity / BMI > 40      Findings as based on:  •  Comprehensive nutrition assessment and consultation  •  Calorie counts (nutrient intake analysis)  •  Food acceptance and intake status from observations by staff  •  Follow up  •  Patient education  •  Intervention secondary to interdisciplinary rounds  •   concerns    Pt meets criteria for severe protein-calorie malnutrition in context of chronic disease   Pt meets criteria for severe protein-calorie malnutrition in context of chronic disease.    NFPE reveals moderate muscle wasting  (temples, shoulders, clavicles, scapula, interosseus, thighs),   moderate/severe fat wasting (triceps.)    PO intake < 75% needs > one month.   Edema 1+ bilateral legs, may be masking further muscle wasting.    Non-pitting edema in arms.   PU sacrum stage 1, nursing reports skin is breaking down.        Treatment:    The following diet has been recommended:  Maintain regular diet to maximize caloric intake  add Ensure Enlive 8 oz tid  add gelatein bid  Check new weight  Consider appetite stimulant  Record PO intake in EMR after each meal (nursing.)   Monitor PO intake, tolerance, labs and weight.         PROVIDER Section:     By signing this assessment you are acknowledging and agree with the diagnosis/diagnoses assigned by the Registered Dietitian    Comments:

## 2019-12-16 NOTE — DIETITIAN INITIAL EVALUATION ADULT. - OTHER INFO
Patient is a 85 y/o F with PMHx of HTN, HLD, arrythmia, CKD II presenting to the ED via EMS form [facility] c/o SOB and  weakness, decreased appetitite. Per EMS, pt reported that she fell out of bed twice within the past 2 days, but did not fall today. Patient is a poor historian. Admitted for pneumonia and UTI- started on IV antibiotics.  Hospital course- CT surgery consult for thoracentesis of parapnuemonic effusion.DX sepsis, FTT, elevated WBC. Palliative care evaluation for [facility] meeting. Patient is a 83 y/o F with PMHx of HTN, HLD, arrythmia, CKD II presenting to the ED via EMS form [facility] c/o SOB and  weakness, decreased appetitite. Per EMS, pt reported that she fell out of bed twice within the past 2 days, but did not fall today. Patient is a poor historian. Admitted for pneumonia and UTI- started on IV antibiotics.  Hospital course- CT surgery consult for thoracentesis of parapnuemonic effusion.DX sepsis, FTT, elevated WBC. Palliative care evaluation for [facility] meeting.  Met with pt, and son at bedside.  Son and nursing report that pt eats very little in past few days, son noticed that pt had diminishing appetite since Thanksgiving.

## 2019-12-16 NOTE — GOALS OF CARE CONVERSATION - ADVANCED CARE PLANNING - CONVERSATION DETAILS
Team met with pts family to discuss goals of care, assist with planning and provide supportive counseling. Pt. had been at the [facility] prior to admission.     Pts current medical condition and goals of care discussed. Team discussed concern that pt. is not eating and drinking. Pts also has pain and anxiety a times as well. Pts family discussed how pt. would not want a feeding tube and that they recognize that pts time is limited. They would like to focus on keeping pt comfortable.    We discussed the option and recommended inpatient hospice and explained these services in detail. Pts family is in agreement and would like to pursue inpatient hospice. They are deciding [facility] and will make a decision tomorrow.    MOLST reviewed and completed. MOLST states DNR/DNI, Comfort Measures, Do not send to hospital, No feeding tube, Trial of IV fluids, Determine use or antibiotics, No more blood draws.      Pts family wishes to stop blow draws now and focus on pts comfort. Plan is for a referral inpatient hospice tomorrow when they chose which facility. Emotional support provided. Our team will continue to follow.

## 2019-12-16 NOTE — PROGRESS NOTE ADULT - SUBJECTIVE AND OBJECTIVE BOX
Date of service: 12-16-19 @ 12:24    Patient lying in bed; has back pain; son at bedside        ROS unable to obtain secondary to patient medical condition     MEDICATIONS  (STANDING):  albumin human  5% IVPB 250 milliLiter(s) IV Intermittent once  apixaban 10 milliGRAM(s) Oral every 12 hours  atorvastatin 10 milliGRAM(s) Oral at bedtime  clonazePAM  Tablet 1 milliGRAM(s) Oral two times a day  clonazePAM  Tablet 0.5 milliGRAM(s) Oral daily  doxycycline hyclate Capsule 100 milliGRAM(s) Oral every 12 hours  ferrous    sulfate 325 milliGRAM(s) Oral two times a day  folic acid 1 milliGRAM(s) Oral daily  lactated ringers. 1000 milliLiter(s) (70 mL/Hr) IV Continuous <Continuous>  melatonin 3 milliGRAM(s) Oral at bedtime  meropenem  IVPB 500 milliGRAM(s) IV Intermittent every 12 hours  metoprolol tartrate 25 milliGRAM(s) Oral two times a day  midodrine. 5 milliGRAM(s) Oral three times a day  mirtazapine 7.5 milliGRAM(s) Oral at bedtime  oxyCODONE    IR 5 milliGRAM(s) Oral two times a day  pantoprazole    Tablet 40 milliGRAM(s) Oral before breakfast  sertraline 100 milliGRAM(s) Oral daily  sodium chloride 0.45%. 1000 milliLiter(s) (50 mL/Hr) IV Continuous <Continuous>  sodium chloride 0.9% Bolus 1000 milliLiter(s) IV Bolus once    MEDICATIONS  (PRN):  acetaminophen   Tablet .. 1000 milliGRAM(s) Oral every 12 hours PRN Moderate Pain (4 - 6)  zolpidem 5 milliGRAM(s) Oral at bedtime PRN Insomnia  zolpidem 5 milliGRAM(s) Oral at bedtime PRN Insomnia      Vital Signs Last 24 Hrs  T(C): 36.4 (16 Dec 2019 11:58), Max: 36.8 (15 Dec 2019 21:24)  T(F): 97.6 (16 Dec 2019 11:58), Max: 98.3 (15 Dec 2019 21:24)  HR: 84 (16 Dec 2019 11:58) (84 - 105)  BP: 78/56 (16 Dec 2019 11:58) (78/56 - 94/61)  BP(mean): --  RR: 21 (16 Dec 2019 11:58) (20 - 21)  SpO2: 94% (16 Dec 2019 11:58) (94% - 95%)    Physical Exam:      PE:    Constitutional: frail looking  HEENT: NC/AT, EOMI, PERRLA, conjunctivae clear; ears and nose atraumatic; pharynx clear  Neck: supple; thyroid not palpable  Back: no tenderness  Respiratory: respiratory effort normal; scattered coarse breath sounds  Cardiovascular: S1S2 regular, no murmurs  Abdomen: soft, not tender, not distended, positive BS; no liver or spleen organomegaly  Genitourinary: no suprapubic tenderness  Musculoskeletal: no muscle tenderness, no joint swelling or tenderness  Neurological/ Psychiatric:   moving all extremities  Skin: no rashes; no palpable lesions    Labs: all available labs reviewed                 Labs:                         Labs:                        9.8    25.05 )-----------( 103      ( 16 Dec 2019 07:38 )             32.2     12-16    143  |  120<H>  |  76<H>  ----------------------------<  125<H>  4.9   |  15<L>  |  2.55<H>    Ca    8.3<L>      16 Dec 2019 07:38  Phos  4.5     12-16    TPro  x   /  Alb  1.5<L>  /  TBili  x   /  DBili  x   /  AST  x   /  ALT  x   /  AlkPhos  x   12-16           Cultures:       Culture - Fungal, Body Fluid (collected 12-13-19 @ 09:45)  Source: .Body Fluid Pleural Fluid  Preliminary Report (12-16-19 @ 10:41):    Testing in progress    Culture - Body Fluid with Gram Stain (collected 12-13-19 @ 09:45)  Source: .Body Fluid Pleural Fluid  Gram Stain (12-13-19 @ 19:34):    polymorphonuclear leukocytes per low power field    No organisms seen    by cytocentrifuge  Preliminary Report (12-14-19 @ 12:50):    No growth    Culture - Acid Fast - Body Fluid w/Smear (collected 12-13-19 @ 09:45)  Source: .Body Fluid Pleural Fluid    Culture - Blood (collected 12-09-19 @ 13:47)  Source: .Blood None  Final Report (12-14-19 @ 18:01):    No growth at 5 days.    Culture - Blood (collected 12-09-19 @ 13:38)  Source: .Blood None  Final Report (12-14-19 @ 18:01):    No growth at 5 days.    Culture - Urine (collected 12-09-19 @ 12:51)  Source: .Urine None  Final Report (12-11-19 @ 17:50):    >100,000 CFU/ml Escherichia coli  Organism: Escherichia coli (12-11-19 @ 17:50)  Organism: Escherichia coli (12-11-19 @ 17:50)      -  Amikacin: S <=16      -  Ampicillin: R >16 These ampicillin results predict results for amoxicillin      -  Ampicillin/Sulbactam: I 16/8 Enterobacter, Citrobacter, and Serratia may develop resistance during prolonged therapy (3-4 days)      -  Aztreonam: S <=4      -  Cefazolin: R >16 (MIC_CL_COM_ENTERIC_CEFAZU) For uncomplicated UTI with K. pneumoniae, E. coli, or P. mirablis: CORBY <=16 is sensitive and CORBY >=32 is resistant. This also predicts results for oral agents cefaclor, cefdinir, cefpodoxime, cefprozil, cefuroxime axetil, cephalexin and locarbef for uncomplicated UTI. Note that some isolates may be susceptible to these agents while testing resistant to cefazolin.      -  Cefepime: S <=4      -  Cefoxitin: R >16      -  Ceftriaxone: S <=1 Enterobacter, Citrobacter, and Serratia may develop resistance during prolonged therapy      -  Ciprofloxacin: R >2      -  Gentamicin: R >8      -  Imipenem: S <=1      -  Levofloxacin: R >4      -  Meropenem: S <=1      -  Nitrofurantoin: R >64 Should not be used to treat pyelonephritis      -  Piperacillin/Tazobactam: S <=16      -  Tigecycline: S <=2      -  Tobramycin: I 8      -  Trimethoprim/Sulfamethoxazole: R >2/38      Method Type: CORBY              Cell Count, Body Fluid (12.13.19 @ 09:45)    Other Body Cells: 0 %    Mesothelial Cells - Body Fluid: 5 %    Eosinophil Count - Body Fluid: 0 %    Fluid Type: Other    Body Fluid Appearance: Clear    BF Lymphocytes: 20 %    Atypical Lymphocytes - Body Fluid: 0 %    Nucleated Red Blood Cells - Body Fluid: 0    Monocyte/Macrophage Count - Body Fluid: 49 %    Fluid Segmented Granulocytes: 26 %    Tube Type: Lavendar    Color - Body Fluid: Yellow    Total Nucleated Cell Count, Body Fluid: 452: Peritoneal/Pleural/ Pericardial  Body Fluid Types            Total Nucleated cells: <500/uL            Neutrophils: <25%          Synovial Body Fluid Type           Total Nucleated cells: <150/uL           Neutrophils: <25%           Lymphocytes: <75%           Moncytes/Macrophages: </=70% /uL    Total RBC Count: 3000 /uL                < from: Xray Chest 1 View AP/PA. (12.09.19 @ 11:45) >    EXAM:  XR CHEST 1 VIEW                            PROCEDURE DATE:  12/09/2019          INTERPRETATION:  Short of breath.    AP chest. Prior 6/17/2019.    Chin obscures portion right apex. Low lung volumes. No change heart   mediastinum. Right chestport reidentified in situ. There is new focal   consolidation the right lower lobe likely representing pneumonia and   atelectasis in the appropriate clinical setting. New small right pleural   effusion.    Impression: Right lower lobe consolidation and small right parapneumonic   effusion      < end of copied text >          Radiology: all available radiological tests reviewed    Advanced directives addressed: full resuscitation

## 2019-12-16 NOTE — DIETITIAN INITIAL EVALUATION ADULT. - ADD RECOMMEND
Record PO intake in EMR after each meal (nursing.) check new weight. Monitor PO intake, tolerance, labs and weight.  Encourage PO intake.  Consider appetite stimulant.

## 2019-12-16 NOTE — PHYSICAL THERAPY INITIAL EVALUATION ADULT - MODALITIES TREATMENT COMMENTS
pt left in bed supine post Eval; bed alarm on; Z-flow boots donned; L UE elevated on pillow; callbell in reach; rosie well; Abd pain persists; RN Chel aware

## 2019-12-16 NOTE — PHYSICAL THERAPY INITIAL EVALUATION ADULT - GENERAL OBSERVATIONS, REHAB EVAL
Z-flow boots donned; pt rec'd in bed supine; L UE edematous; L 1st toe cyanotic; Abd pain reported 8/10; RN Chel made aware; SOB noted @ rest

## 2019-12-16 NOTE — DIETITIAN INITIAL EVALUATION ADULT. - PERTINENT MEDS FT
MEDICATIONS  (STANDING):  apixaban 10 milliGRAM(s) Oral every 12 hours  atorvastatin 10 milliGRAM(s) Oral at bedtime  clonazePAM  Tablet 1 milliGRAM(s) Oral two times a day  clonazePAM  Tablet 0.5 milliGRAM(s) Oral daily  doxycycline hyclate Capsule 100 milliGRAM(s) Oral every 12 hours  ferrous    sulfate 325 milliGRAM(s) Oral two times a day  folic acid 1 milliGRAM(s) Oral daily  lactated ringers. 1000 milliLiter(s) (70 mL/Hr) IV Continuous <Continuous>  melatonin 3 milliGRAM(s) Oral at bedtime  meropenem  IVPB 500 milliGRAM(s) IV Intermittent every 12 hours  metoprolol tartrate 25 milliGRAM(s) Oral two times a day  mirtazapine 7.5 milliGRAM(s) Oral at bedtime  oxyCODONE    IR 5 milliGRAM(s) Oral two times a day  pantoprazole    Tablet 40 milliGRAM(s) Oral before breakfast  sertraline 100 milliGRAM(s) Oral daily  sodium chloride 0.45%. 1000 milliLiter(s) (50 mL/Hr) IV Continuous <Continuous>    MEDICATIONS  (PRN):  acetaminophen   Tablet .. 1000 milliGRAM(s) Oral every 12 hours PRN Moderate Pain (4 - 6)  zolpidem 5 milliGRAM(s) Oral at bedtime PRN Insomnia  zolpidem 5 milliGRAM(s) Oral at bedtime PRN Insomnia

## 2019-12-16 NOTE — PROGRESS NOTE ADULT - SUBJECTIVE AND OBJECTIVE BOX
Patient is a 84y Female who is alet, limited intake    REVIEW OF SYSTEMS:    harika BEDOYA    MEDICATIONS  (STANDING):  albumin human  5% IVPB 250 milliLiter(s) IV Intermittent once  apixaban 10 milliGRAM(s) Oral every 12 hours  atorvastatin 10 milliGRAM(s) Oral at bedtime  doxycycline hyclate Capsule 100 milliGRAM(s) Oral every 12 hours  ferrous    sulfate 325 milliGRAM(s) Oral two times a day  folic acid 1 milliGRAM(s) Oral daily  lactated ringers. 1000 milliLiter(s) (70 mL/Hr) IV Continuous <Continuous>  melatonin 3 milliGRAM(s) Oral at bedtime  meropenem  IVPB 500 milliGRAM(s) IV Intermittent every 12 hours  metoprolol tartrate 25 milliGRAM(s) Oral two times a day  midodrine. 5 milliGRAM(s) Oral three times a day  mirtazapine 7.5 milliGRAM(s) Oral at bedtime  pantoprazole    Tablet 40 milliGRAM(s) Oral before breakfast  sertraline 100 milliGRAM(s) Oral daily  sodium chloride 0.45%. 1000 milliLiter(s) (50 mL/Hr) IV Continuous <Continuous>    MEDICATIONS  (PRN):  acetaminophen   Tablet .. 1000 milliGRAM(s) Oral every 12 hours PRN Moderate Pain (4 - 6)  LORazepam   Injectable 0.25 milliGRAM(s) IV Push every 6 hours PRN Anxiety  morphine  - Injectable 2 milliGRAM(s) IV Push every 4 hours PRN severe pain or dyspnea        T(C): , Max: 36.8 (12-15-19 @ 21:24)  T(F): , Max: 98.3 (12-15-19 @ 21:24)  HR: 109 (12-16-19 @ 17:35)  BP: 87/53 (12-16-19 @ 17:35)  BP(mean): --  RR: 21 (12-16-19 @ 11:58)  SpO2: 94% (12-16-19 @ 11:58)  Wt(kg): --    12-15 @ 07:01  -  12-16 @ 07:00  --------------------------------------------------------  IN: 0 mL / OUT: 200 mL / NET: -200 mL    12-16 @ 07:01  -  12-16 @ 17:51  --------------------------------------------------------  IN: 0 mL / OUT: 150 mL / NET: -150 mL          PHYSICAL EXAM:    Constitutional: frail, cachectic  HEENT: dry, temp was  Neck: No LAD, No JVD  Respiratory: dist  Cardiovascular: S1 and S2, RRR  Gastrointestinal: BS+, soft, NT/ND  Extremities: No peripheral edema  Neurological: Arousable  : No Herbert  Skin: No rashes  Access: Not applicable        LABS:                        9.8    25.05 )-----------( 103      ( 16 Dec 2019 07:38 )             32.2     16 Dec 2019 07:38    143    |  120    |  76     ----------------------------<  125    4.9     |  15     |  2.55   15 Dec 2019 07:00    148    |  122    |  77     ----------------------------<  93     4.8     |  18     |  2.49   13 Dec 2019 07:35    145    |  122    |  70     ----------------------------<  110    4.3     |  15     |  2.16     Ca    8.3        16 Dec 2019 07:38  Ca    8.3        15 Dec 2019 07:00  Ca    8.1        13 Dec 2019 07:35  Phos  4.5       16 Dec 2019 07:38    TPro  x      /  Alb  1.5    /  TBili  x      /  DBili  x      /  AST  x      /  ALT  x      /  AlkPhos  x      16 Dec 2019 07:38          Urine Studies:          RADIOLOGY & ADDITIONAL STUDIES:

## 2019-12-16 NOTE — PHYSICAL THERAPY INITIAL EVALUATION ADULT - MANUAL MUSCLE TESTING RESULTS, REHAB EVAL
except R shld FE 3+/5; L UE: shld FE 2/5; elbow flex 3-/5; ext 4-/5;  3+/5; R LE: hip/knee flex 4-/5; knee ext 4-/5; L LE: hip/knee flex 2/5; knee ext 2+/5; ankle PF/DF 3+/5/no strength deficits were identified

## 2019-12-16 NOTE — PROGRESS NOTE ADULT - SUBJECTIVE AND OBJECTIVE BOX
Subjective: No acute events overnight.    Vital Signs:  Vital Signs Last 24 Hrs  T(F): 97.6  HR: 70   BP: 92/40  RR: 21  SpO2: 94%    Relevant labs, radiology and Medications reviewed                        9.8    25.05 )-----------( 103      ( 16 Dec 2019 07:38 )             32.2     12-16    143  |  120<H>  |  76<H>  ----------------------------<  125<H>  4.9   |  15<L>  |  2.55<H>    Ca    8.3<L>      16 Dec 2019 07:38  Phos  4.5     12-16    TPro  x   /  Alb  1.5<L>  /  TBili  x   /  DBili  x   /  AST  x   /  ALT  x   /  AlkPhos  x   12-16      MEDICATIONS  (STANDING):  albumin human  5% IVPB 250 milliLiter(s) IV Intermittent once  apixaban 10 milliGRAM(s) Oral every 12 hours  atorvastatin 10 milliGRAM(s) Oral at bedtime  clonazePAM  Tablet 1 milliGRAM(s) Oral two times a day  clonazePAM  Tablet 0.5 milliGRAM(s) Oral daily  doxycycline hyclate Capsule 100 milliGRAM(s) Oral every 12 hours  ferrous    sulfate 325 milliGRAM(s) Oral two times a day  folic acid 1 milliGRAM(s) Oral daily  lactated ringers. 1000 milliLiter(s) (70 mL/Hr) IV Continuous <Continuous>  melatonin 3 milliGRAM(s) Oral at bedtime  meropenem  IVPB 500 milliGRAM(s) IV Intermittent every 12 hours  metoprolol tartrate 25 milliGRAM(s) Oral two times a day  midodrine. 5 milliGRAM(s) Oral three times a day  mirtazapine 7.5 milliGRAM(s) Oral at bedtime  oxyCODONE    IR 5 milliGRAM(s) Oral two times a day  pantoprazole    Tablet 40 milliGRAM(s) Oral before breakfast  sertraline 100 milliGRAM(s) Oral daily  sodium chloride 0.45%. 1000 milliLiter(s) (50 mL/Hr) IV Continuous <Continuous>    MEDICATIONS  (PRN):  acetaminophen   Tablet .. 1000 milliGRAM(s) Oral every 12 hours PRN Moderate Pain (4 - 6)  zolpidem 5 milliGRAM(s) Oral at bedtime PRN Insomnia  zolpidem 5 milliGRAM(s) Oral at bedtime PRN Insomnia      Physical exam  Gen: NAD, mildly labored breathing  Neuro: sleeping  Card: S1 S2  Pulm: breath sounds equal bilaterally  Abd: Softly distended  Ext: moderate upper extremity edema    I&O's Summary    15 Dec 2019 07:01  -  16 Dec 2019 07:00  --------------------------------------------------------  IN: 0 mL / OUT: 200 mL / NET: -200 mL    16 Dec 2019 07:01  -  16 Dec 2019 16:22  --------------------------------------------------------  IN: 0 mL / OUT: 150 mL / NET: -150 mL        Assessment  84y Female  w/ PAST MEDICAL & SURGICAL HISTORY:  Chronic kidney disease  GERD (gastroesophageal reflux disease)  Urinary incontinence  Diarrhea  Osteoporosis  Obesity  Hyperlipidemia  Hypertension  Abdominal pain  Anxiety  Anemia  Myeloma  Nephrolithiasis  S/P cataract surgery: b/l  S/P colonoscopy  S/P tonsillectomy  Encounter for cholecystectomy  Abnormal biopsy result: abdomen May 2019  H/O total hip arthroplasty, bilateral  History of bilateral knee arthroplasty  H/O total hysterectomy  History of hernia repair  admitted with complaints of Patient is a 84y old  Female who presents with a chief complaint of Pneumonia with parapneumonic effusion  UTI  ARF  Weakness  sepsis (16 Dec 2019 14:12)  .  On 12/13/19 patient underwent Right thoracentesis  .     PLAN    Pleural fluid cultures are negative to date.  Continue antibiotics as per primary team.      Discussed with Cardiothoracic Team at AM rounds.

## 2019-12-16 NOTE — PROGRESS NOTE ADULT - SUBJECTIVE AND OBJECTIVE BOX
Patient is a 83 y/o F with PMHx of HTN, HLD, arrythmia, CKD II presenting to the ED via EMS form [facility] c/o SOB and  weakness, decreased appetitite. Per EMS, pt reported that she fell out of bed twice within the past 2 days, but did not fall today. Patient is a poor historian. Admitted for pneumonia and UTI- started on IV antibiotics.   Hospital course- CT surgery consult for thoracentesis of parapnuemonic effusion.    12/16    Vital Signs Last 24 Hrs  T(C): 36.6 (16 Dec 2019 05:15), Max: 36.8 (15 Dec 2019 21:24)  T(F): 97.9 (16 Dec 2019 05:15), Max: 98.3 (15 Dec 2019 21:24)  HR: 98 (16 Dec 2019 05:15) (79 - 100)  BP: 93/68 (16 Dec 2019 05:15) (92/49 - 94/58)  BP(mean): --  RR: 20 (16 Dec 2019 05:15) (20 - 20)  SpO2: 95% (16 Dec 2019 05:15) (95% - 96%)    REVIEW OF SYSTEMS:  CONSTITUTIONAL: + weakness  EYES/ENT: No visual changes;  No vertigo or throat pain   NECK: No pain or stiffness  RESPIRATORY: No cough, wheezing, hemoptysis; No shortness of breath  CARDIOVASCULAR: Abdominal distention  GENITOURINARY: No dysuria, frequency or hematuria  NEUROLOGICAL: No numbness or weakness  SKIN: No itching, burning, rashes, or lesions   All other review of systems is negative unless indicated above.    PE:  Constitutional: NAD, laying in bed, sick, deconditioned and frail  HEENT: NC/AT  Back: no tenderness  Respiratory: respirations even and non labored, coarse breath sounds throughout lung fields.   Cardiovascular: S1S2 regular, no murmurs  Abdomen: soft, not tender, + distended,  Genitourinary: voiding  Rectal: deferred  Musculoskeletal: no muscle tenderness, no joint swelling or tenderness                                  INTERPRETATION:  Short of breath.    AP chest. Prior 6/17/2019.    Chin obscures portion right apex. Low lung volumes. No change heart   mediastinum. Right chestport reidentified in situ. There is new focal   consolidation the right lower lobe likely representing pneumonia and   atelectasis in the appropriate clinical setting. New small right pleural   effusion.    Impression: Right lower lobe consolidation and small right parapneumonic   effusion Patient is a 83 y/o F with PMHx of HTN, HLD, arrythmia, CKD II presenting to the ED via EMS form [facility] c/o SOB and  weakness, decreased appetitite. Per EMS, pt reported that she fell out of bed twice within the past 2 days, but did not fall today. Patient is a poor historian. Admitted for pneumonia and UTI- started on IV antibiotics.   Hospital course- CT surgery consult for thoracentesis of parapnuemonic effusion.    12/16- patient sleepy but arousable. Complaining of abdominal pain. Hypotensive this AM- given midodrine, IVF and ALbumin. GOC discussion this PM with her son.     Vital Signs Last 24 Hrs  T(C): 36.6 (16 Dec 2019 05:15), Max: 36.8 (15 Dec 2019 21:24)  T(F): 97.9 (16 Dec 2019 05:15), Max: 98.3 (15 Dec 2019 21:24)  HR: 98 (16 Dec 2019 05:15) (79 - 100)  BP: 93/68 (16 Dec 2019 05:15) (92/49 - 94/58)  BP(mean): --  RR: 20 (16 Dec 2019 05:15) (20 - 20)  SpO2: 95% (16 Dec 2019 05:15) (95% - 96%)    REVIEW OF SYSTEMS:  CONSTITUTIONAL: + weakness  EYES/ENT: No visual changes;  No vertigo or throat pain   NECK: No pain or stiffness  RESPIRATORY: No cough, wheezing, hemoptysis; No shortness of breath  CARDIOVASCULAR: Abdominal distention  GENITOURINARY: No dysuria, frequency or hematuria  NEUROLOGICAL: No numbness or weakness  SKIN: No itching, burning, rashes, or lesions   All other review of systems is negative unless indicated above.    PE:  Constitutional: NAD, laying in bed, sick, deconditioned and frail  HEENT: NC/AT  Back: no tenderness  Respiratory: respirations even and non labored, coarse breath sounds throughout lung fields.   Cardiovascular: S1S2 regular, no murmurs  Abdomen: soft, not tender, + distended,  Genitourinary: voiding  Rectal: deferred  Musculoskeletal: no muscle tenderness, no joint swelling or tenderness                                  INTERPRETATION:  Short of breath.    AP chest. Prior 6/17/2019.    Chin obscures portion right apex. Low lung volumes. No change heart   mediastinum. Right chestport reidentified in situ. There is new focal   consolidation the right lower lobe likely representing pneumonia and   atelectasis in the appropriate clinical setting. New small right pleural   effusion.    Impression: Right lower lobe consolidation and small right parapneumonic   effusion Patient is a 83 y/o F with PMHx of HTN, HLD, arrythmia, CKD II presenting to the ED via EMS form [facility] c/o SOB and  weakness, decreased appetitite. Per EMS, pt reported that she fell out of bed twice within the past 2 days, but did not fall today. Patient is a poor historian. Admitted for pneumonia and UTI- started on IV antibiotics.   Hospital course- CT surgery consult for thoracentesis of parapnuemonic effusion.    12/16- patient sleepy but arousable. Complaining of abdominal pain. Hypotensive this AM- given midodrine, IVF and ALbumin. GOC discussion this PM with her son. GOC -  with comfort measures. transfer to [facility]. Poor prognosis.     Vital Signs Last 24 Hrs  T(C): 36.6 (16 Dec 2019 05:15), Max: 36.8 (15 Dec 2019 21:24)  T(F): 97.9 (16 Dec 2019 05:15), Max: 98.3 (15 Dec 2019 21:24)  HR: 98 (16 Dec 2019 05:15) (79 - 100)  BP: 93/68 (16 Dec 2019 05:15) (92/49 - 94/58)  BP(mean): --  RR: 20 (16 Dec 2019 05:15) (20 - 20)  SpO2: 95% (16 Dec 2019 05:15) (95% - 96%)    REVIEW OF SYSTEMS:  CONSTITUTIONAL: + weakness  EYES/ENT: No visual changes;  No vertigo or throat pain   NECK: No pain or stiffness  RESPIRATORY: No cough, wheezing, hemoptysis; No shortness of breath  CARDIOVASCULAR: Abdominal distention  GENITOURINARY: No dysuria, frequency or hematuria  NEUROLOGICAL: No numbness or weakness  SKIN: No itching, burning, rashes, or lesions   All other review of systems is negative unless indicated above.    PE:  Constitutional: NAD, laying in bed, sick, deconditioned and frail  HEENT: NC/AT  Back: no tenderness  Respiratory: respirations even and non labored, coarse breath sounds throughout lung fields.   Cardiovascular: S1S2 regular, no murmurs  Abdomen: soft, not tender, + distended,  Genitourinary: voiding  Rectal: deferred  Musculoskeletal: no muscle tenderness, no joint swelling or tenderness                                  INTERPRETATION:  Short of breath.    AP chest. Prior 6/17/2019.    Chin obscures portion right apex. Low lung volumes. No change heart   mediastinum. Right chestport reidentified in situ. There is new focal   consolidation the right lower lobe likely representing pneumonia and   atelectasis in the appropriate clinical setting. New small right pleural   effusion.    Impression: Right lower lobe consolidation and small right parapneumonic   effusion

## 2019-12-16 NOTE — PROGRESS NOTE ADULT - ASSESSMENT
Pt is an 85 y/o F with PMHx of HTN, HLD, arrythmia, CKD II admitted from assisted living on 12/9 for evaluation of shortness of breath and weakness; patient is unclear as to why she is in hospital; history per medical record as patient is poor historian. She gives history of penicillin allergy over 50 years ago and she does not recall her reaction.     1. Patient admitted with pneumonia which will treat as community acquired pneumonia; also noted with leukocytosis most likely reactive to infection  - follow up cultures   - oxygen and nebs as needed   - serial cbc and monitor temperature   - reviewed prior medical records to evaluate for resistant or atypical pathogens   - iv hydration and supportive care   - day #6  doxycycline and day #4 meropenem; total 7 days antibiotics  - will complete one more day of antibiotics  - parapneumonic fluid drained, transudative appearing  - tolerating antibiotics without rashes or side effects   - Monitor closely in view of history of penicillin allergy   2. other issues: per medicine

## 2019-12-16 NOTE — PROGRESS NOTE ADULT - SUBJECTIVE AND OBJECTIVE BOX
HPI: Pt is a 84y old Female with hx of  HTN, HLD, arrythmia, CKD II presenting [facility] the ED [facility] c/o SOB and  weakness, decreased appetite Per EMS, pt reported that she fell out of bed twice within the past 2 days, but did not fall today. Patient is a poor historian. Admitted for pneumonia and UTI- started on IV antibiotics.     2019 patient seen and examined with no family at the bedside. Patient had thoracentesis today with CT surgery, patient denies any pain at this time. Patient is alert but confused.   2019 patient seen and examined with son at bedside. Patient is complaining of pain, spoke with RN Chel to give standing dose of oxycodone that was help this AM. Patient alert but confused. Son states mentation has been declining over the past few weeks      PAIN: (x )Yes   ( )No  Level: moderate   Location: backside   Intensity:   7 /10  Quality: patient unable to describe     DYSPNEA: ( ) Yes  ( x) No    Review of Systems:    Unable to obtain/Limited due to: confusion     PHYSICAL EXAM:    Vital Signs Last 24 Hrs  T(C): 36.1 (16 Dec 2019 08:52), Max: 36.8 (15 Dec 2019 21:24)  T(F): 97 (16 Dec 2019 08:52), Max: 98.3 (15 Dec 2019 21:24)  HR: 105 (16 Dec 2019 08:52) (92 - 105)  BP: 94/61 (16 Dec 2019 08:52) (92/49 - 94/61)  RR: 20 (16 Dec 2019 05:15) (20 - 20)  SpO2: 95% (16 Dec 2019 08:52) (95% - 95%)  Daily Weight in k (16 Dec 2019 09:16)    PPSV2: 20%    General: elderly pleasant women in bed, NAD noted   Mental Status: alert but confused   HEENT: + temporal wasting   Lungs: coarse b/l breath sounds, scattered wheezing   Cardiac: s1s2 +   GI: non tender non distended   : + voiding   Ext: no edema b/l     LABS:                        9.8    25.05 )-----------( 103      ( 16 Dec 2019 07:38 )             32.2     12-    143  |  120<H>  |  76<H>  ----------------------------<  125<H>  4.9   |  15<L>  |  2.55<H>    Ca    8.3<L>      16 Dec 2019 07:38  Phos  4.5     -    TPro  x   /  Alb  1.5<L>  /  TBili  x   /  DBili  x   /  AST  x   /  ALT  x   /  AlkPhos  x         Albumin: Albumin, Serum: 1.5 g/dL ( @ 07:38)      Allergies    Erythromycin Base (Other)  penicillin (Rash)    Intolerances      MEDICATIONS  (STANDING):  apixaban 10 milliGRAM(s) Oral every 12 hours  atorvastatin 10 milliGRAM(s) Oral at bedtime  clonazePAM  Tablet 1 milliGRAM(s) Oral two times a day  clonazePAM  Tablet 0.5 milliGRAM(s) Oral daily  doxycycline hyclate Capsule 100 milliGRAM(s) Oral every 12 hours  ferrous    sulfate 325 milliGRAM(s) Oral two times a day  folic acid 1 milliGRAM(s) Oral daily  lactated ringers. 1000 milliLiter(s) (70 mL/Hr) IV Continuous <Continuous>  melatonin 3 milliGRAM(s) Oral at bedtime  meropenem  IVPB 500 milliGRAM(s) IV Intermittent every 12 hours  metoprolol tartrate 25 milliGRAM(s) Oral two times a day  mirtazapine 7.5 milliGRAM(s) Oral at bedtime  oxyCODONE    IR 5 milliGRAM(s) Oral two times a day  pantoprazole    Tablet 40 milliGRAM(s) Oral before breakfast  sertraline 100 milliGRAM(s) Oral daily  sodium chloride 0.45%. 1000 milliLiter(s) (50 mL/Hr) IV Continuous <Continuous>    MEDICATIONS  (PRN):  acetaminophen   Tablet .. 1000 milliGRAM(s) Oral every 12 hours PRN Moderate Pain (4 - 6)  zolpidem 5 milliGRAM(s) Oral at bedtime PRN Insomnia  zolpidem 5 milliGRAM(s) Oral at bedtime PRN Insomnia      RADIOLOGY

## 2019-12-16 NOTE — PROGRESS NOTE ADULT - ASSESSMENT
Pt is a 84y old Female with hx of  HTN, HLD, arrythmia, CKD II presenting to the ED via EMS form [facility] c/o SOB and  weakness, decreased appetitite. Per EMS, pt reported that she fell out of bed twice within the past 2 days, but did not fall today. Patient is a poor historian. Admitted for pneumonia and UTI- started on IV antibiotics.     1) Sepsis   - Pneumonia vs. para-pneumonic effusion vs empyema  - ID consult appreciated   - CT chest showed moderate bilateral pleural effusion, large amount of ascites and bilateral lower lobe opacities compatible with atelectasis and probable superimposed pneumonia.  - CT surgery consult appreciated   - s/p thoracentesis on 12/13/2019   - Pulmonary consult Appreciated     2) Severe Protein malnutrition   - Failure to thrive   - albumin 1.5  - Registered Dietician consult appreciated     3) UTI   - Continue Abx as per ID     4) Smoldering Myeloma   - patient received chemo with Dr. Isabel   - oncology consult appreciated   - family aware metastatic disease     5) advanced care planning   - Patient lack capacity   - Will check on content for HCP paperwork - patients son and daughter listed in chart   - FULL code at this time   - GOC meeting today 12/16/2019 at 3:30

## 2019-12-16 NOTE — GOALS OF CARE CONVERSATION - ADVANCED CARE PLANNING - CONVERSATION DETAILS
Team met with pts family to discuss goals of care, assist with planning and provide supportive counseling. Pt. had been at the [facility] prior to admission.     Pts current medical condition and goals of care discussed. Team discussed concern that pt. is not eating and drinking. Pts also has pain and anxiety a times as well. Pts family discussed how pt. would not want a feeding tube and that they recognize that pts time is limited. They would like to focus on keeping pt comfortable.    We discussed the option and recommended inpatient hospice and explained these services in detail. Pts family is in agreement and would like to pursue inpatient hospice. They are deciding [facility] and will make a decision tomorrow.    MOLST reviewed and completed. MOLST states DNR/DNI, Comfort Measures, Do not send to hospital, No feeding tube, Trial of IV fluids, Determine use or antibiotics, No more blood draws.      Pts family wishes to stop blow draws now and focus on pts comfort. Plan is for a referral inpatient hospice tomorrow when they chose which facility. Emotional support provided. Our team will continue to follow.

## 2019-12-16 NOTE — PROGRESS NOTE ADULT - ASSESSMENT
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        P:  HGB stable, no bleeding  cont eliquis 10 mg po every 12 hours, risk of VTE with present splenic infarct and CA is higher than the risk of bleed  on 12/22 switch to eliquis 5 mg po every 12 hours  Eliquis efficacious in the use in pts with + VTE and Ca   monitor CBC/BMP    will sign off, please reconsult if needed

## 2019-12-16 NOTE — PROGRESS NOTE ADULT - SUBJECTIVE AND OBJECTIVE BOX
HPI:  Pt is an 83 y/o F with PMHx of HTN, HLD, arrythmia, CKD II presenting to the ED on 12/9 via EMS form [facility] c/o SOB and  weakness, decreased appetite . workup revealed B/L PNA< Pleural effusions is now S/P Thoracentesis, pt has h/p smouldering myeloma, is thrombocytopenic and primary peritoneal carcinoma and is S/P Chemo .  Chest abd reported splenic infarct so we are consulted for AC recommendations and  management.               Fam HX:   unavailable (09 Dec 2019 14:51)      Patient is a 84y old  Female who presents with a chief complaint of Pneumonia with parapneumonic effusion  UTI  ARF  WEakness  sepsis (14 Dec 2019 12:56)      Consulted by Dr. Ewing   for VTE prophylaxis, risk stratification, and anticoagulation management.    PAST MEDICAL & SURGICAL HISTORY:  Chronic kidney disease  GERD (gastroesophageal reflux disease)  Urinary incontinence  Diarrhea  Osteoporosis  Obesity  Hyperlipidemia  Hypertension  Abdominal pain  Anxiety  Anemia  Myeloma  Nephrolithiasis  S/P cataract surgery: b/l  S/P colonoscopy  S/P tonsillectomy  Encounter for cholecystectomy  Abnormal biopsy result: abdomen May 2019  H/O total hip arthroplasty, bilateral  History of bilateral knee arthroplasty  H/O total hysterectomy  History of hernia repair          CrCl: 18    IMPROVE VTE Risk Score:IMPROVE VTE Individual Risk Assessment          RISK                                                          Points  [ x ] Previous VTE                                                3  [  ] Thrombophilia                                             2  [  ] Lower limb paralysis                                   2        (unable to hold up >15 seconds)    [  x] Current Cancer                                             2         (within 6 months)  [  ] Immobilization > 24 hrs                              1  [  ] ICU/CCU stay > 24 hours                             1  [x  ] Age > 60                                                         1    IMPROVE VTE Score:         [  8       ]    Total Risk Factor Score:    0 - 1:   Consider IPC  >2 - 3:  Thromboprophylaxis required (enoxaparin or SQ heparin)        >4:   High Risk: Thromboprophylaxis required (enoxaparin or SQ heparin), optional add IPC  **If CONTRAINDICATION to enoxaparin or SQ heparin, USE IPCs**            IMPROVE Bleeding Risk Score : 4      Time In:   Time Out:     Falls Risk:   High (x  )  Mod (  )  Low (  )      FAMILY HISTORY:    Denies any personal or familial history of clotting or bleeding disorders.    Allergies    Erythromycin Base (Other)  penicillin (Rash)    Intolerances        REVIEW OF SYSTEMS    (  )Fever	     (  )Constipation	(  )SOB				(  )Headache	(  )Dysuria  (  )Chills	     (  )Melena	(  )Dyspnea present on exertion	                    (  )Dizziness                    (  )Polyuria  (  )Nausea	     (  )Hematochezia	(  )Cough			                    (  )Syncope   	(  )Hematuria  (  )Vomiting    (  )Chest Pain	(  )Wheezing			(  )Weakness  (  )Diarrhea     (  )Palpitations	(  )Anorexia			( x )joint pain    All  other review of systems negative: Yes    Vital Signs Last 24 Hrs  T(C): 36.4 (12-16-19 @ 11:58), Max: 36.8 (12-15-19 @ 21:24)  T(F): 97.6 (12-16-19 @ 11:58), Max: 98.3 (12-15-19 @ 21:24)  HR: 70 (12-16-19 @ 14:11) (70 - 105)  BP: 92/40 (12-16-19 @ 14:11) (78/56 - 94/61)  BP(mean): --  RR: 21 (12-16-19 @ 11:58) (20 - 21)  SpO2: 94% (12-16-19 @ 11:58) (94% - 95%)      PHYSICAL EXAM:    Constitutional: very ill appearing    Neurological:somnolent    Skin: Warm    Respiratory and Thorax: normal effort; Breath sounds: normal; BLB crackles  	  Cardiovascular: S1, S2, regular, NMBR	    Gastrointestinal: BS + x 4Q, nontender	    Genitourinary:  Bladder nondistended, nontender    Musculoskeletal:   General Right:   no muscle/joint tenderness,   normal tone, no joint swelling,   ROM: limited	    General Left:   no muscle/joint tenderness,   normal tone, no joint swelling,   ROM: limited      Lower extrems:   Right: no calf tenderness              negative zee's sign               + pedal pulses    Left:   no calf tenderness              negative zee's sign               + pedal pulses                              9.8    25.05 )-----------( 103      ( 16 Dec 2019 07:38 )             32.2       12-16    143  |  120<H>  |  76<H>  ----------------------------<  125<H>  4.9   |  15<L>  |  2.55<H>    Ca    8.3<L>      16 Dec 2019 07:38  Phos  4.5     12-16    TPro  x   /  Alb  1.5<L>  /  TBili  x   /  DBili  x   /  AST  x   /  ALT  x   /  AlkPhos  x   12-16                                  11.8   29.73 )-----------( 81       ( 14 Dec 2019 01:42 )             40.9       12-13    145  |  122<H>  |  70<H>  ----------------------------<  110<H>  4.3   |  15<L>  |  2.16<H>    Ca    8.1<L>      13 Dec 2019 07:35        PT/INR - ( 13 Dec 2019 07:42 )   PT: 20.1 sec;   INR: 1.78 ratio         PTT - ( 14 Dec 2019 01:42 )  PTT:192.0 sec				          CT Chest/abd pelvis    FINDINGS:    LUNGS AND AIRWAYS: Patent central airways.  Bilateral lower lobe   compressive and dependent atelectasis.    PLEURA: Moderate right and small left pleural effusions.    MEDIASTINUM AND CASTILLO: No lymphadenopathy.    VESSELS: Right-sided central venous port with tip in the superior vena   cava. Atherosclerotic arterial calcifications, including diffuse coronary   artery calcification.    HEART: Heart size is normal. No pericardial effusion.    CHEST WALL AND LOWER NECK: Within normal limits.    VISUALIZED UPPER ABDOMEN: Large amount of ascites. Linear low density   region in the spleen, new from prior study. 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   Cholecystectomy. Nonobstructing right renal stones.    BONES: Left shoulder arthroplasty. No acute bony abnormality.    IMPRESSION:     Moderate bilateral pleural effusions.  Large amount of ascites.  Bilateral lower lobe opacities compatible with atelectasis. Superimposed   pneumonia not excluded.  Low-density region in the spleen suggestive of infarction.    Ct head:      The brain demonstrates mild periventricular and bifrontal biparietal   subcortical white matter ischemia is noted.   No acute cerebral cortical   infarct is seen.  No intracranial hemorrhage is found.  No mass effect is   found in the brain.      The ventricles, sulci and basal cisterns appear unremarkable.    The orbits are unremarkable.  The paranasal sinuses are clear.  The nasal   cavity appears intact.  The nasopharynx is symmetric.  The central skull   base, petrous temporal bones and calvarium remain intact.      IMPRESSION:   mild periventricular and bifrontal biparietal subcortical   white matter ischemia is noted          MEDICATIONS  (STANDING):  apixaban 10 milliGRAM(s) Oral every 12 hours  atorvastatin 10 milliGRAM(s) Oral at bedtime  clonazePAM  Tablet 1 milliGRAM(s) Oral two times a day  clonazePAM  Tablet 0.5 milliGRAM(s) Oral daily  doxycycline hyclate Capsule 100 milliGRAM(s) Oral every 12 hours  ferrous    sulfate 325 milliGRAM(s) Oral two times a day  folic acid 1 milliGRAM(s) Oral daily  lactated ringers. 1000 milliLiter(s) IV Continuous <Continuous>  melatonin 3 milliGRAM(s) Oral at bedtime  meropenem  IVPB 500 milliGRAM(s) IV Intermittent every 12 hours  metoprolol tartrate 25 milliGRAM(s) Oral two times a day  mirtazapine 7.5 milliGRAM(s) Oral at bedtime  oxyCODONE    IR 5 milliGRAM(s) Oral two times a day  pantoprazole    Tablet 40 milliGRAM(s) Oral before breakfast  sertraline 100 milliGRAM(s) Oral daily          DVT Prophylaxis:  LMWH                   (  )  Heparin SQ           (  )  Coumadin             (  )  Xarelto                  (  )  Eliquis                   ( x )  Venodynes           ( x )  Ambulation          ( x )  UFH                       (  )  Contraindicated  (  )  EC ASPIRIN       (  )

## 2019-12-16 NOTE — DIETITIAN INITIAL EVALUATION ADULT. - PHYSICAL APPEARANCE
other (specify) Edema 1+ bilateral legs, non-pitting bilateral arms.  Terrence 11.  PU stage 1 Sacrum

## 2019-12-16 NOTE — PROGRESS NOTE ADULT - ASSESSMENT
84 MM, HTN, HLD, arrythmia, CKD III presenting to the ED via EMS form [facility] c/o SOB and  weakness, decreased appetitive with renal evaluation of DAMON.     DAMON  -Suspect pre-renal with poor intake and diuretic use, palliative noted-Comfort care  - Renal sono - neg for hydro   - Maintain MAP, optimize intake    Hypernatremia    increase po fluid intake    CKD III  -baseline near 1.5 mg/dl  -NSAID avoidance    d/w RN staff and son  Seen this AM, note now    will sign off, call with any question or issues.

## 2019-12-16 NOTE — DIETITIAN INITIAL EVALUATION ADULT. - MALNUTRITION
Pt meets criteria for severe protein-calorie malnutrition in context of chronic disease.  NFPE reveals moderate muscle wasting  (temples, shoulders, clavicles, scapula, interosseus, thighs), moderate/severe fat wasting (triceps.)  PO intake < 75% needs > one month.  Edema 1+ bilateral legs, may be masking further muscle wasting.  Non-pitting edema in arms.  PU sacrum stage 1, nursing reports skin is breaking down. Pt meets criteria for severe protein-calorie malnutrition in context of chronic disease

## 2019-12-16 NOTE — PROGRESS NOTE ADULT - ASSESSMENT
PROBLEMS:    CAP-right lower lobe consolidation and small right parapneumonic effusion- persistant elevated wbc CT chest to evaluate effusion may need thoracentesis to evalue empyema  UTI-e-coli  DAMON on CKD stage 2- prerenal   leukocytosis / anemia /  thrombocytopenia  h/o peritoneal carcinomatosis    PLAN:    pulmonary unchanged-poor prognosis with h/o carcinomatosis  IV meropenem/doxycylin  poor prognosis  supportive care  D/w DR Hein   dvt prophylasix

## 2019-12-16 NOTE — DIETITIAN INITIAL EVALUATION ADULT. - ENERGY NEEDS
Ht.   59   "        Wt.    59    kg               BMI     30             IBW       kg               Pt is at  153  %  IBW      ABW    53 kg

## 2019-12-17 ENCOUNTER — TRANSCRIPTION ENCOUNTER (OUTPATIENT)
Age: 84
End: 2019-12-17

## 2019-12-17 VITALS
OXYGEN SATURATION: 96 % | DIASTOLIC BLOOD PRESSURE: 46 MMHG | HEART RATE: 68 BPM | TEMPERATURE: 97 F | SYSTOLIC BLOOD PRESSURE: 95 MMHG | RESPIRATION RATE: 19 BRPM

## 2019-12-17 RX ORDER — ASCORBIC ACID 60 MG
1 TABLET,CHEWABLE ORAL
Qty: 0 | Refills: 0 | DISCHARGE

## 2019-12-17 RX ORDER — LANOLIN ALCOHOL/MO/W.PET/CERES
2 CREAM (GRAM) TOPICAL
Qty: 0 | Refills: 0 | DISCHARGE

## 2019-12-17 RX ORDER — ASPIRIN/CALCIUM CARB/MAGNESIUM 324 MG
1 TABLET ORAL
Qty: 0 | Refills: 0 | DISCHARGE

## 2019-12-17 RX ORDER — POTASSIUM CITRATE MONOHYDRATE 100 %
2 POWDER (GRAM) MISCELLANEOUS
Qty: 0 | Refills: 0 | DISCHARGE

## 2019-12-17 RX ORDER — ATORVASTATIN CALCIUM 80 MG/1
1 TABLET, FILM COATED ORAL
Qty: 0 | Refills: 0 | DISCHARGE

## 2019-12-17 RX ORDER — METOPROLOL TARTRATE 50 MG
1 TABLET ORAL
Qty: 0 | Refills: 0 | DISCHARGE

## 2019-12-17 RX ORDER — ACETAMINOPHEN 500 MG
2 TABLET ORAL
Qty: 0 | Refills: 0 | DISCHARGE

## 2019-12-17 RX ORDER — ZOLPIDEM TARTRATE 10 MG/1
1 TABLET ORAL
Qty: 0 | Refills: 0 | DISCHARGE

## 2019-12-17 RX ORDER — CLONAZEPAM 1 MG
0.5 TABLET ORAL
Qty: 0 | Refills: 0 | DISCHARGE

## 2019-12-17 RX ORDER — CLONAZEPAM 1 MG
1 TABLET ORAL
Qty: 0 | Refills: 0 | DISCHARGE

## 2019-12-17 RX ORDER — CHOLECALCIFEROL (VITAMIN D3) 125 MCG
1 CAPSULE ORAL
Qty: 0 | Refills: 0 | DISCHARGE

## 2019-12-17 RX ORDER — ACETAMINOPHEN 500 MG
2 TABLET ORAL
Qty: 0 | Refills: 0 | DISCHARGE
Start: 2019-12-17

## 2019-12-17 RX ORDER — LOPERAMIDE HCL 2 MG
1 TABLET ORAL
Qty: 0 | Refills: 0 | DISCHARGE

## 2019-12-17 RX ORDER — ONDANSETRON 8 MG/1
4 TABLET, FILM COATED ORAL
Qty: 0 | Refills: 0 | DISCHARGE
Start: 2019-12-17

## 2019-12-17 RX ORDER — FOLIC ACID 0.8 MG
1 TABLET ORAL
Qty: 0 | Refills: 0 | DISCHARGE

## 2019-12-17 RX ORDER — PREGABALIN 225 MG/1
1 CAPSULE ORAL
Qty: 0 | Refills: 0 | DISCHARGE

## 2019-12-17 RX ORDER — PANTOPRAZOLE SODIUM 20 MG/1
1 TABLET, DELAYED RELEASE ORAL
Qty: 0 | Refills: 0 | DISCHARGE

## 2019-12-17 RX ORDER — OXYCODONE HYDROCHLORIDE 5 MG/1
1 TABLET ORAL
Qty: 0 | Refills: 0 | DISCHARGE

## 2019-12-17 RX ORDER — MIRTAZAPINE 45 MG/1
1 TABLET, ORALLY DISINTEGRATING ORAL
Qty: 0 | Refills: 0 | DISCHARGE

## 2019-12-17 RX ORDER — CLONAZEPAM 1 MG
0.5 TABLET ORAL DAILY
Refills: 0 | Status: DISCONTINUED | OUTPATIENT
Start: 2019-12-17 | End: 2019-12-17

## 2019-12-17 RX ORDER — SPIRONOLACTONE 25 MG/1
1 TABLET, FILM COATED ORAL
Qty: 0 | Refills: 0 | DISCHARGE

## 2019-12-17 RX ORDER — ONDANSETRON 8 MG/1
4 TABLET, FILM COATED ORAL EVERY 6 HOURS
Refills: 0 | Status: DISCONTINUED | OUTPATIENT
Start: 2019-12-17 | End: 2019-12-17

## 2019-12-17 RX ORDER — SERTRALINE 25 MG/1
3 TABLET, FILM COATED ORAL
Qty: 0 | Refills: 0 | DISCHARGE

## 2019-12-17 RX ORDER — IBANDRONATE SODIUM 150 MG/1
1 TABLET ORAL
Qty: 0 | Refills: 0 | DISCHARGE

## 2019-12-17 RX ORDER — MORPHINE SULFATE 50 MG/1
2 CAPSULE, EXTENDED RELEASE ORAL
Qty: 0 | Refills: 0 | DISCHARGE
Start: 2019-12-17

## 2019-12-17 RX ORDER — FERROUS SULFATE 325(65) MG
1 TABLET ORAL
Qty: 0 | Refills: 0 | DISCHARGE

## 2019-12-17 RX ADMIN — MORPHINE SULFATE 2 MILLIGRAM(S): 50 CAPSULE, EXTENDED RELEASE ORAL at 09:45

## 2019-12-17 RX ADMIN — MORPHINE SULFATE 2 MILLIGRAM(S): 50 CAPSULE, EXTENDED RELEASE ORAL at 15:30

## 2019-12-17 RX ADMIN — SERTRALINE 100 MILLIGRAM(S): 25 TABLET, FILM COATED ORAL at 12:36

## 2019-12-17 RX ADMIN — MORPHINE SULFATE 2 MILLIGRAM(S): 50 CAPSULE, EXTENDED RELEASE ORAL at 09:24

## 2019-12-17 RX ADMIN — MIDODRINE HYDROCHLORIDE 5 MILLIGRAM(S): 2.5 TABLET ORAL at 06:30

## 2019-12-17 RX ADMIN — APIXABAN 10 MILLIGRAM(S): 2.5 TABLET, FILM COATED ORAL at 06:29

## 2019-12-17 RX ADMIN — Medication 0.5 MILLIGRAM(S): at 13:14

## 2019-12-17 RX ADMIN — MORPHINE SULFATE 2 MILLIGRAM(S): 50 CAPSULE, EXTENDED RELEASE ORAL at 00:05

## 2019-12-17 RX ADMIN — PANTOPRAZOLE SODIUM 40 MILLIGRAM(S): 20 TABLET, DELAYED RELEASE ORAL at 06:30

## 2019-12-17 RX ADMIN — Medication 25 MILLIGRAM(S): at 06:30

## 2019-12-17 RX ADMIN — ONDANSETRON 4 MILLIGRAM(S): 8 TABLET, FILM COATED ORAL at 12:35

## 2019-12-17 RX ADMIN — Medication 1 MILLIGRAM(S): at 12:36

## 2019-12-17 RX ADMIN — MEROPENEM 100 MILLIGRAM(S): 1 INJECTION INTRAVENOUS at 06:56

## 2019-12-17 RX ADMIN — Medication 100 MILLIGRAM(S): at 06:30

## 2019-12-17 RX ADMIN — MORPHINE SULFATE 2 MILLIGRAM(S): 50 CAPSULE, EXTENDED RELEASE ORAL at 14:38

## 2019-12-17 RX ADMIN — Medication 325 MILLIGRAM(S): at 06:29

## 2019-12-17 RX ADMIN — MIDODRINE HYDROCHLORIDE 5 MILLIGRAM(S): 2.5 TABLET ORAL at 12:36

## 2019-12-17 RX ADMIN — Medication 0.25 MILLIGRAM(S): at 13:27

## 2019-12-17 NOTE — DISCHARGE NOTE NURSING/CASE MANAGEMENT/SOCIAL WORK - PATIENT PORTAL LINK FT
You can access the FollowMyHealth Patient Portal offered by [facility] by registering at the following website: [email or web address]. By joining [facility]’s FollowMyHealth portal, you will also be able to view your health information using other applications (apps) compatible with our system.

## 2019-12-17 NOTE — DISCHARGE NOTE PROVIDER - HOSPITAL COURSE
83 y/o F with PMHx of HTN, HLD, arrythmia, CKD II presenting [facility] ED [facility] c/o SOB and  weakness, decreased appetitite. Per EMS, pt reported that she fell out of bed twice within the past 2 days, but did not fall today. Patient is a poor historian. Admitted for pneumonia and UTI- started on IV antibiotics.     Hospital course- CT surgery consult for thoracentesis of parapnuemonic effusion. s/p thoracentesis. Patient with very poor prognosis- was hypotensive yesterday and received  Albumin, Midodrine and IVF bolus. GOC discussion with patient and her son and they would like Inpatient hospice.         Vital Signs Last 24 Hrs    T(C): 36.4 (17 Dec 2019 05:49), Max: 36.4 (16 Dec 2019 11:58)    T(F): 97.5 (17 Dec 2019 05:49), Max: 97.6 (16 Dec 2019 11:58)    HR: 96 (17 Dec 2019 05:49) (70 - 109)    BP: 105/66 (17 Dec 2019 05:49) (78/56 - 105/66)    BP(mean): --    RR: 18 (17 Dec 2019 05:49) (18 - 21)    SpO2: 97% (17 Dec 2019 05:49) (94% - 98%)        Constitutional: NAD, laying in bed, sick, deconditioned and frail    HEENT: NC/AT    Back: no tenderness    Respiratory: respirations even and non labored, coarse breath sounds throughout lung fields.     Cardiovascular: S1S2 regular, no murmurs    Abdomen: soft, not tender, + distended,    Genitourinary: voiding    Rectal: deferred    Musculoskeletal: no muscle tenderness, no joint swelling or tenderness 83 y/o F with PMHx of HTN, HLD, arrythmia, CKD II presenting to the ED [facility] c/o SOB and  weakness, decreased appetitite. Per EMS, pt reported that she fell out of bed twice within the past 2 days, but did not fall today. Patient is a poor historian. Admitted for pneumonia and UTI- started on IV antibiotics.     Hospital course- CT surgery consult for thoracentesis of parapnuemonic effusion. s/p thoracentesis. Patient with very poor prognosis- was hypotensive yesterday and received  Albumin, Midodrine and IVF bolus. GOC discussion with patient and her son and they would like Inpatient hospice.         Vital Signs Last 24 Hrs    T(C): 36.4 (17 Dec 2019 05:49), Max: 36.4 (16 Dec 2019 11:58)    T(F): 97.5 (17 Dec 2019 05:49), Max: 97.6 (16 Dec 2019 11:58)    HR: 96 (17 Dec 2019 05:49) (70 - 109)    BP: 105/66 (17 Dec 2019 05:49) (78/56 - 105/66)    BP(mean): --    RR: 18 (17 Dec 2019 05:49) (18 - 21)    SpO2: 97% (17 Dec 2019 05:49) (94% - 98%)        Constitutional: NAD, laying in bed, sick, deconditioned and frail    HEENT: NC/AT    Back: no tenderness    Respiratory: respirations even and non labored, coarse breath sounds throughout lung fields.     Cardiovascular: S1S2 regular, no murmurs    Abdomen: soft, not tender, + distended,    Genitourinary: voiding    Rectal: deferred    Musculoskeletal: no muscle tenderness, no joint swelling or tenderness        Total time spent on discharge including coordination of care: 45 minutes

## 2019-12-17 NOTE — DISCHARGE NOTE PROVIDER - NSDCCPCAREPLAN_GEN_ALL_CORE_FT
PRINCIPAL DISCHARGE DIAGNOSIS  Diagnosis: Sepsis  Assessment and Plan of Treatment: completed IV antibiotic.  Plan to transfer to inpatient hospice- no further antibiotics.  Morphine as needed for pain.  Lorazepam IV as needed for agitation/ discomfort.   care as per Inpatient hispice team.      SECONDARY DISCHARGE DIAGNOSES  Diagnosis: Pleural effusion  Assessment and Plan of Treatment: s/p thoracentesis.      Diagnosis: UTI (urinary tract infection)  Assessment and Plan of Treatment: completed IV antibiotic  treatment.

## 2019-12-17 NOTE — PROGRESS NOTE ADULT - SUBJECTIVE AND OBJECTIVE BOX
Subjective:    pat lying in bed, son @ bedside, for home hospice placement.    Home Medications:  acetaminophen 500 mg oral tablet: 2 tab(s) orally every 12 hours, As needed, Moderate Pain (4 - 6) (17 Dec 2019 08:45)  LORazepam: 0.25 milligram(s) intravenous every 6 hours, As Needed (17 Dec 2019 08:45)  morphine: 2 milligram(s) intravenous every 4 hours, As Needed (17 Dec 2019 08:45)    MEDICATIONS  (STANDING):  apixaban 10 milliGRAM(s) Oral every 12 hours  atorvastatin 10 milliGRAM(s) Oral at bedtime  clonazePAM  Tablet 0.5 milliGRAM(s) Oral daily  doxycycline hyclate Capsule 100 milliGRAM(s) Oral every 12 hours  ferrous    sulfate 325 milliGRAM(s) Oral two times a day  folic acid 1 milliGRAM(s) Oral daily  lactated ringers. 1000 milliLiter(s) (70 mL/Hr) IV Continuous <Continuous>  melatonin 3 milliGRAM(s) Oral at bedtime  meropenem  IVPB 500 milliGRAM(s) IV Intermittent every 12 hours  metoprolol tartrate 25 milliGRAM(s) Oral two times a day  midodrine. 5 milliGRAM(s) Oral three times a day  mirtazapine 7.5 milliGRAM(s) Oral at bedtime  pantoprazole    Tablet 40 milliGRAM(s) Oral before breakfast  sertraline 100 milliGRAM(s) Oral daily  sodium chloride 0.45%. 1000 milliLiter(s) (50 mL/Hr) IV Continuous <Continuous>    MEDICATIONS  (PRN):  acetaminophen   Tablet .. 1000 milliGRAM(s) Oral every 12 hours PRN Moderate Pain (4 - 6)  LORazepam   Injectable 0.25 milliGRAM(s) IV Push every 6 hours PRN Anxiety  morphine  - Injectable 2 milliGRAM(s) IV Push every 4 hours PRN severe pain or dyspnea  ondansetron Injectable 4 milliGRAM(s) IV Push every 6 hours PRN Nausea and/or Vomiting      Allergies    Erythromycin Base (Other)  penicillin (Rash)    Intolerances        Vital Signs Last 24 Hrs  T(C): 36.2 (17 Dec 2019 11:22), Max: 36.4 (16 Dec 2019 21:14)  T(F): 97.1 (17 Dec 2019 11:22), Max: 97.5 (16 Dec 2019 21:14)  HR: 68 (17 Dec 2019 11:22) (68 - 109)  BP: 95/46 (17 Dec 2019 11:22) (87/53 - 105/66)  BP(mean): --  RR: 19 (17 Dec 2019 11:22) (18 - 19)  SpO2: 96% (17 Dec 2019 11:22) (96% - 98%)      PHYSICAL EXAMINATION:    NECK:  Supple. No lymphadenopathy. Jugular venous pressure not elevated. Carotids equal.   HEART:   The cardiac impulse has a normal quality. Reg., Nl S1 and S2.  There are no murmurs, rubs or gallops noted  CHEST:  Chest crackles to auscultation. Normal respiratory effort.  ABDOMEN:  Soft and nontender.   EXTREMITIES:  There is no edema.       LABS:                        9.8    25.05 )-----------( 103      ( 16 Dec 2019 07:38 )             32.2     12-16    143  |  120<H>  |  76<H>  ----------------------------<  125<H>  4.9   |  15<L>  |  2.55<H>    Ca    8.3<L>      16 Dec 2019 07:38  Phos  4.5     12-16    TPro  x   /  Alb  1.5<L>  /  TBili  x   /  DBili  x   /  AST  x   /  ALT  x   /  AlkPhos  x   12-16

## 2019-12-17 NOTE — PROGRESS NOTE ADULT - NSHPATTENDINGPLANDISCUSS_GEN_ALL_CORE
Dr. North
Dr. North and team
patient, RN, NP, daughter- Stella over phone

## 2019-12-17 NOTE — DISCHARGE NOTE PROVIDER - NSDCMRMEDTOKEN_GEN_ALL_CORE_FT
acetaminophen 500 mg oral tablet: 2 tab(s) orally every 12 hours, As needed, Moderate Pain (4 - 6)  LORazepam: 0.25 milligram(s) intravenous every 6 hours, As Needed  morphine: 2 milligram(s) intravenous every 4 hours, As Needed acetaminophen 500 mg oral tablet: 2 tab(s) orally every 12 hours, As needed, Moderate Pain (4 - 6)  LORazepam: 0.25 milligram(s) intravenous every 6 hours, As Needed  morphine: 2 milligram(s) intravenous every 4 hours, As Needed  ondansetron 2 mg/mL injectable solution: 4 milligram(s) injectable every 4 hours

## 2019-12-17 NOTE — PROGRESS NOTE ADULT - SUBJECTIVE AND OBJECTIVE BOX
HPI: Pt is a 84y old Female with hx of  HTN, HLD, arrythmia, CKD II presenting [facility] ED [facility] c/o SOB and  weakness, decreased appetite Per EMS, pt reported that she fell out of bed twice within the past 2 days, but did not fall today. Patient is a poor historian. Admitted for pneumonia and UTI- started on IV antibiotics    12/17/2019 patient seen and examined with no family at bedside, patient c/o of pain in her back. Denies any other complaints       PAIN: (x )Yes   ( )No  Level: moderate   Location: backside   Intensity:   7 /10  Quality: patient unable to describe     DYSPNEA: ( ) Yes  ( x) No    Review of Systems:    Anxiety- history of anxiety   Depression- denies   Physical Discomfort- moderate- severe   Dyspnea- denies     Unable to obtain/Limited due to: confusion     PHYSICAL EXAM:    Vital Signs Last 24 Hrs  T(C): 36.4 (17 Dec 2019 05:49), Max: 36.4 (16 Dec 2019 11:58)  T(F): 97.5 (17 Dec 2019 05:49), Max: 97.6 (16 Dec 2019 11:58)  HR: 96 (17 Dec 2019 05:49) (70 - 109)  BP: 105/66 (17 Dec 2019 05:49) (78/56 - 105/66)  RR: 18 (17 Dec 2019 05:49) (18 - 21)  SpO2: 97% (17 Dec 2019 05:49) (94% - 98%)    PPSV2: 20%    General: elderly pleasant women in bed, NAD noted   Mental Status: alert but confused   HEENT: + temporal wasting   Lungs: coarse b/l breath sounds, scattered wheezing   Cardiac: s1s2 +   GI: non tender non distended   : + voiding   Ext: no edema b/l     LABS:                        9.8    25.05 )-----------( 103      ( 16 Dec 2019 07:38 )             32.2     12-16    143  |  120<H>  |  76<H>  ----------------------------<  125<H>  4.9   |  15<L>  |  2.55<H>    Ca    8.3<L>      16 Dec 2019 07:38  Phos  4.5     12-16    TPro  x   /  Alb  1.5<L>  /  TBili  x   /  DBili  x   /  AST  x   /  ALT  x   /  AlkPhos  x   12-16      Albumin: Albumin, Serum: 1.5 g/dL (12-16 @ 07:38)      Allergies    Erythromycin Base (Other)  penicillin (Rash)    Intolerances      MEDICATIONS  (STANDING):  apixaban 10 milliGRAM(s) Oral every 12 hours  atorvastatin 10 milliGRAM(s) Oral at bedtime  clonazePAM  Tablet 0.5 milliGRAM(s) Oral daily  doxycycline hyclate Capsule 100 milliGRAM(s) Oral every 12 hours  ferrous    sulfate 325 milliGRAM(s) Oral two times a day  folic acid 1 milliGRAM(s) Oral daily  lactated ringers. 1000 milliLiter(s) (70 mL/Hr) IV Continuous <Continuous>  melatonin 3 milliGRAM(s) Oral at bedtime  meropenem  IVPB 500 milliGRAM(s) IV Intermittent every 12 hours  metoprolol tartrate 25 milliGRAM(s) Oral two times a day  midodrine. 5 milliGRAM(s) Oral three times a day  mirtazapine 7.5 milliGRAM(s) Oral at bedtime  pantoprazole    Tablet 40 milliGRAM(s) Oral before breakfast  sertraline 100 milliGRAM(s) Oral daily  sodium chloride 0.45%. 1000 milliLiter(s) (50 mL/Hr) IV Continuous <Continuous>    MEDICATIONS  (PRN):  acetaminophen   Tablet .. 1000 milliGRAM(s) Oral every 12 hours PRN Moderate Pain (4 - 6)  LORazepam   Injectable 0.25 milliGRAM(s) IV Push every 6 hours PRN Anxiety  morphine  - Injectable 2 milliGRAM(s) IV Push every 4 hours PRN severe pain or dyspnea

## 2019-12-17 NOTE — PROGRESS NOTE ADULT - ASSESSMENT
PROBLEMS:    CAP-right lower lobe consolidation and small right parapneumonic effusion- persistant elevated wbc CT chest to evaluate effusion may need thoracentesis to evalue empyema  UTI-e-coli  DAMON on CKD stage 2- prerenal   leukocytosis / anemia /  thrombocytopenia  h/o peritoneal carcinomatosis    PLAN:    pulmonary unchanged-poor prognosis with h/o carcinomatosis-hospice placement  IV meropenem/doxycylin  poor prognosis  supportive care  D/w DR Hein   dvt prophylasix

## 2019-12-17 NOTE — PROGRESS NOTE ADULT - ASSESSMENT
Pt is a 84y old Female with hx of  HTN, HLD, arrythmia, CKD II presenting to the ED via EMS form [facility] c/o SOB and  weakness, decreased appetitite. Per EMS, pt reported that she fell out of bed twice within the past 2 days, but did not fall today. Patient is a poor historian. Admitted for pneumonia and UTI- started on IV antibiotics.     1) Sepsis   - Pneumonia vs. para-pneumonic effusion vs empyema  - ID consult appreciated   - CT chest showed moderate bilateral pleural effusion, large amount of ascites and bilateral lower lobe opacities compatible with atelectasis and probable superimposed pneumonia.  - CT surgery consult appreciated   - s/p thoracentesis on 12/13/2019   - Pulmonary consult Appreciated     2) Severe Protein malnutrition   - Failure to thrive   - albumin 1.5  - Registered Dietician consult appreciated     3) UTI   - Continue Abx as per ID     4) Smoldering Myeloma   - patient received chemo with Dr. Isabel   - oncology consult appreciated   - family aware metastatic disease     5) advanced care planning   - Patient lack capacity   - MOLST: DNR/I, comfort focus   - GO meeting held on 12/16/2019 - patient family would like to focus on comfort, Speak to SW to make referral to [facility] for inpatient hospice

## 2019-12-17 NOTE — PROGRESS NOTE ADULT - REASON FOR ADMISSION
Pneumonia with parapneumonic effusion  UTI  ARF  WEakness  sepsis

## 2019-12-17 NOTE — DISCHARGE NOTE PROVIDER - CARE PROVIDER_API CALL
Chris Harding (MD)  Family Medicine  [address], [address]  [place] [address]  Phone: [phone]  Fax: [phone]  Follow Up Time:

## 2019-12-18 LAB
CULTURE RESULTS: SIGNIFICANT CHANGE UP
SPECIMEN SOURCE: SIGNIFICANT CHANGE UP

## 2019-12-20 DIAGNOSIS — Z51.5 ENCOUNTER FOR PALLIATIVE CARE: ICD-10-CM

## 2019-12-20 DIAGNOSIS — D73.5 INFARCTION OF SPLEEN: ICD-10-CM

## 2019-12-20 DIAGNOSIS — I12.9 HYPERTENSIVE CHRONIC KIDNEY DISEASE WITH STAGE 1 THROUGH STAGE 4 CHRONIC KIDNEY DISEASE, OR UNSPECIFIED CHRONIC KIDNEY DISEASE: ICD-10-CM

## 2019-12-20 DIAGNOSIS — A41.9 SEPSIS, UNSPECIFIED ORGANISM: ICD-10-CM

## 2019-12-20 DIAGNOSIS — N39.0 URINARY TRACT INFECTION, SITE NOT SPECIFIED: ICD-10-CM

## 2019-12-20 DIAGNOSIS — N17.9 ACUTE KIDNEY FAILURE, UNSPECIFIED: ICD-10-CM

## 2019-12-20 DIAGNOSIS — R62.7 ADULT FAILURE TO THRIVE: ICD-10-CM

## 2019-12-20 DIAGNOSIS — E43 UNSPECIFIED SEVERE PROTEIN-CALORIE MALNUTRITION: ICD-10-CM

## 2019-12-20 DIAGNOSIS — C90.00 MULTIPLE MYELOMA NOT HAVING ACHIEVED REMISSION: ICD-10-CM

## 2019-12-20 DIAGNOSIS — J18.9 PNEUMONIA, UNSPECIFIED ORGANISM: ICD-10-CM

## 2019-12-20 DIAGNOSIS — Z79.82 LONG TERM (CURRENT) USE OF ASPIRIN: ICD-10-CM

## 2019-12-20 DIAGNOSIS — N18.2 CHRONIC KIDNEY DISEASE, STAGE 2 (MILD): ICD-10-CM

## 2019-12-20 DIAGNOSIS — D69.6 THROMBOCYTOPENIA, UNSPECIFIED: ICD-10-CM

## 2019-12-20 DIAGNOSIS — E87.0 HYPEROSMOLALITY AND HYPERNATREMIA: ICD-10-CM

## 2020-01-11 LAB
CULTURE RESULTS: SIGNIFICANT CHANGE UP
SPECIMEN SOURCE: SIGNIFICANT CHANGE UP

## 2020-02-01 LAB
CULTURE RESULTS: SIGNIFICANT CHANGE UP
SPECIMEN SOURCE: SIGNIFICANT CHANGE UP

## 2020-04-03 NOTE — ED ADULT NURSE NOTE - NS PRO AD PATIENT TYPE
Abdirizak Piedra  PGY-1, Internal Medicine, Team  p. [phone]    SUBJECTIVE / OVERNIGHT EVENTS:  Patient seen and examined at bedside. ROS not assesed as pt refusing to respond.     Other Review of Systems Negative.    MEDICATIONS  (STANDING):  desmopressin 0.2 milliGRAM(s) Oral at bedtime  diVALproex  milliGRAM(s) Oral <User Schedule>  enoxaparin Injectable 40 milliGRAM(s) SubCutaneous daily  levothyroxine 50 MICROGram(s) Oral daily  OXcarbazepine 600 milliGRAM(s) Oral <User Schedule>  OXcarbazepine 1200 milliGRAM(s) Oral <User Schedule>  oxybutynin 5 milliGRAM(s) Oral three times a day  thiothixene 10 milliGRAM(s) Oral <User Schedule>    MEDICATIONS  (PRN):  acetaminophen   Tablet .. 650 milliGRAM(s) Oral every 6 hours PRN Temp greater or equal to 38C (100.4F)  diazepam    Tablet 10 milliGRAM(s) Oral daily PRN for anxiety      OBJECTIVE:    Vital Signs Last 24 Hrs  T(C): 36.6 (03 Apr 2020 06:33), Max: 36.6 (02 Apr 2020 13:04)  T(F): 97.9 (03 Apr 2020 06:33), Max: 97.9 (02 Apr 2020 13:04)  HR: 90 (03 Apr 2020 06:33) (82 - 90)  BP: 109/70 (03 Apr 2020 06:33) (106/67 - 109/70)  BP(mean): --  RR: 18 (03 Apr 2020 06:33) (18 - 18)  SpO2: 94% (03 Apr 2020 06:33) (93% - 94%)    CAPILLARY BLOOD GLUCOSE        I&O's Summary    02 Apr 2020 07:01  -  03 Apr 2020 07:00  --------------------------------------------------------  IN: 460 mL / OUT: 0 mL / NET: 460 mL        PHYSICAL EXAM:  GENERAL: NAD, well-developed  HEAD:  Atraumatic, Normocephalic  EYES: EOMI, PERRLA, conjunctiva and sclera clear  NECK: Supple, No JVD  CHEST/LUNG: Clear to auscultation bilaterally; No wheeze  HEART: Regular rate and rhythm; No murmurs, rubs, or gallops  ABDOMEN: Soft, Nontender, Nondistended; Bowel sounds present  EXTREMITIES:  2+ Peripheral Pulses, No clubbing, cyanosis, or edema  PSYCH: AAOx3  NEUROLOGY: non-focal  SKIN: No rashes or lesions    LABS:                        14.2   9.64  )-----------( 384      ( 02 Apr 2020 07:04 )             42.1     Auto Eosinophil # 0.25  / Auto Eosinophil % 2.6   / Auto Neutrophil # 4.78  / Auto Neutrophil % 49.6  / BANDS % x        04-02    141  |  101  |  19  ----------------------------<  82  4.6   |  26  |  0.57    Ca    9.6      02 Apr 2020 07:04  Mg     1.9     04-02  Phos  3.9     04-02                  ABG:     VBG:       Care Discussed with Consultants/Other Providers:    RADIOLOGY & ADDITIONAL TESTS:  (Imaging Personally Reviewed) Health Care Proxy (HCP)
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
## 2023-01-09 NOTE — PROGRESS NOTE ADULT - PROVIDER SPECIALTY LIST ADULT
Pulmonology Subjective:      Patient ID: Beryle L Moss is a 80 y.o. female.    Chief Complaint: Pain and Injury of the Left Knee    HPI  Patient is in without complaint other than she is annoyed with her immobilizer for her left knee.  When asked how she is been she states she has been as active as possible.  She is been out in the yd pulling weeks etc..    ROS      Objective:    Ortho Exam     Constitutional:   Patient is alert  and oriented in no acute distress  HEENT:  normocephalic atraumatic; PERRL EOMI  Neck:  Supple without adenopathy  Cardiovascular:  Normal rate and rhythm  Pulmonary:  Normal respiratory effort normal chest wall expansion  Abdominal:  Nonprotuberant nondistended  Musculoskeletal:  Patient comes in using her knee immobilizer.  She has an active straight leg raise against resistance without discomfort.  Has 90 degrees of flexion  Neurological:  No focal defect; cranial nerves 2-12 grossly intact  Psychiatric/behavioral:  Mood and behavior normal      X-Ray Knee 3 View Left  Narrative: EXAMINATION:  XR KNEE 3 VIEW LEFT    CLINICAL HISTORY:  Displaced transverse fracture of left patella, initial encounter for closed fracture    TECHNIQUE:  AP, lateral, and Merchant views of the left knee were performed.    COMPARISON:  11/10/2022    FINDINGS:  Mildly displaced fracture of the patella slightly more displaced than on the prior exam.  No dislocation.  Very small joint effusion which has decreased.    Old internal fixation of the proximal tibia.  Impression: As above    Electronically signed by: Natalie Schaefer MD  Date:    01/09/2023  Time:    13:32       My Findings:    I have personally reviewed radiographs and concur with above findings    Assessment:       No diagnosis found.      Plan:       I have discussed medical condition treatment options was radiographic findings with her and her son at length.  I have told her that her activity level is a bit too aggressive.  I have explained the radiographic  changes we have discussed possible surgical intervention but that was declined.  She is doing quite well clinically.  I have suggested no weight-bearing in flexion and extended period of time using her brace if she is having difficulty with that restriction.  Follow up radiographs in 4-6 weeks sooner if there is any questions or problems.        Past Medical History:   Diagnosis Date    Arthritis     Hypertension, essential 2/17/2020    PONV (postoperative nausea and vomiting)     Retinal detachment      Past Surgical History:   Procedure Laterality Date    BLADDER REPAIR      Dr. Flynn    CATARACT EXTRACTION      EYE SURGERY      FRACTURE SURGERY  2020    left shoulder replacement    HIP SURGERY Right 10/26/2016    HYSTERECTOMY  age 50    AUB    JOINT REPLACEMENT  2016    right hip    KNEE SURGERY Left     injury from fall off ladder, bone under knee cap was crushed and replaced with artificial bones, has plate in billings area    LASIK      OOPHORECTOMY      with hyst    RETINAL DETACHMENT SURGERY      REVERSE TOTAL SHOULDER ARTHROPLASTY Left 2/19/2020    Procedure: ARTHROPLASTY, SHOULDER, TOTAL, REVERSE;  Surgeon: RALPH Manley MD;  Location: [facility];  Service: Orthopedics;  Laterality: Left;  REGIONAL W/CATHETER, INTERSCALENE         Current Outpatient Medications:     alendronate (FOSAMAX) 70 MG tablet, Take 1 tablet (70 mg total) by mouth every 7 days., Disp: 4 tablet, Rfl: 11    calcium-vitamin D 500-125 mg-unit tablet, Take 2 tablets by mouth once daily. , Disp: , Rfl:     EScitalopram oxalate (LEXAPRO) 20 MG tablet, Take 1 tablet (20 mg total) by mouth once daily., Disp: 90 tablet, Rfl: 3    losartan (COZAAR) 50 MG tablet, Take 1 tablet (50 mg total) by mouth once daily., Disp: 90 tablet, Rfl: 3    multivitamin capsule, Take 1 tablet by mouth Daily. 1 Capsule Oral Every day, Disp: , Rfl:     RED YEAST RICE ORAL, Take by mouth., Disp: , Rfl:     Review of patient's allergies indicates:   Allergen Reactions     Hydromorphone Nausea And Vomiting    Ondansetron Nausea And Vomiting    Oxycodone Nausea And Vomiting    Phenergan vc [promethazine-phenylephrine] Nausea And Vomiting    Tramadol Nausea And Vomiting    Morphine      Other reaction(s): Vomiting  Other reaction(s): Nausea       Family History   Problem Relation Age of Onset    Arthritis Father     Melanoma Daughter     Alcohol abuse Maternal Uncle     Cancer Neg Hx     Diabetes Neg Hx     Heart disease Neg Hx     Colon polyps Neg Hx     Amblyopia Neg Hx     Blindness Neg Hx     Cataracts Neg Hx     Glaucoma Neg Hx     Macular degeneration Neg Hx     Retinal detachment Neg Hx     Strabismus Neg Hx     Stroke Neg Hx     Thyroid disease Neg Hx     Breast cancer Neg Hx     Colon cancer Neg Hx     Eclampsia Neg Hx     Ovarian cancer Neg Hx     Hypertension Neg Hx     Miscarriages / Stillbirths Neg Hx      labor Neg Hx      Social History     Occupational History    Occupation: Retired     Employer: RETIRED   Tobacco Use    Smoking status: Never    Smokeless tobacco: Never   Substance and Sexual Activity    Alcohol use: Yes     Alcohol/week: 3.0 standard drinks     Types: 3 Glasses of wine per week     Comment: 1 glass of wine nightly    Drug use: No    Sexual activity: Yes     Partners: Male     Birth control/protection: None

## 2023-03-20 NOTE — PHYSICAL THERAPY INITIAL EVALUATION ADULT - LEVEL OF INDEPENDENCE: SIT/STAND, REHAB EVAL
Call concerns.  Routine followup six months, labs prior.   Gets routine cbc/cmp with rheumatology.   TBA

## 2023-10-07 NOTE — DISCHARGE NOTE PROVIDER - NS AS DC PROVIDER CONTACT Y/N MULTI
Yes Laboratory results reviewed and discussed with patient. CBC shows normal WBC count, Hb/Hct and platelet count are WNL. BMP shows electrolytes WNL and no YADIRA. ESR/CRP are elevated.